# Patient Record
Sex: FEMALE | Race: WHITE | NOT HISPANIC OR LATINO | Employment: FULL TIME | URBAN - METROPOLITAN AREA
[De-identification: names, ages, dates, MRNs, and addresses within clinical notes are randomized per-mention and may not be internally consistent; named-entity substitution may affect disease eponyms.]

---

## 2017-04-13 ENCOUNTER — OFFICE VISIT (OUTPATIENT)
Dept: FAMILY MEDICINE | Facility: CLINIC | Age: 37
End: 2017-04-13
Payer: COMMERCIAL

## 2017-04-13 ENCOUNTER — TELEPHONE (OUTPATIENT)
Dept: FAMILY MEDICINE | Facility: CLINIC | Age: 37
End: 2017-04-13

## 2017-04-13 VITALS
TEMPERATURE: 97.8 F | OXYGEN SATURATION: 99 % | DIASTOLIC BLOOD PRESSURE: 90 MMHG | SYSTOLIC BLOOD PRESSURE: 140 MMHG | HEART RATE: 78 BPM

## 2017-04-13 DIAGNOSIS — M62.830 BACK MUSCLE SPASM: ICD-10-CM

## 2017-04-13 DIAGNOSIS — R30.0 DYSURIA: Primary | ICD-10-CM

## 2017-04-13 LAB
ALBUMIN UR-MCNC: NEGATIVE MG/DL
APPEARANCE UR: CLEAR
BACTERIA #/AREA URNS HPF: ABNORMAL /HPF
BETA HCG QUAL IFA URINE: NEGATIVE
BILIRUB UR QL STRIP: NEGATIVE
COLOR UR AUTO: YELLOW
GLUCOSE UR STRIP-MCNC: NEGATIVE MG/DL
HCG UR QL: NORMAL
HGB UR QL STRIP: ABNORMAL
KETONES UR STRIP-MCNC: NEGATIVE MG/DL
LEUKOCYTE ESTERASE UR QL STRIP: NEGATIVE
NITRATE UR QL: NEGATIVE
NON-SQ EPI CELLS #/AREA URNS LPF: ABNORMAL /LPF
PH UR STRIP: 6.5 PH (ref 5–7)
RBC #/AREA URNS AUTO: ABNORMAL /HPF (ref 0–2)
SP GR UR STRIP: 1.02 (ref 1–1.03)
URN SPEC COLLECT METH UR: ABNORMAL
UROBILINOGEN UR STRIP-ACNC: 1 EU/DL (ref 0.2–1)
WBC #/AREA URNS AUTO: ABNORMAL /HPF (ref 0–2)

## 2017-04-13 PROCEDURE — 84703 CHORIONIC GONADOTROPIN ASSAY: CPT | Performed by: NURSE PRACTITIONER

## 2017-04-13 PROCEDURE — 99213 OFFICE O/P EST LOW 20 MIN: CPT | Performed by: NURSE PRACTITIONER

## 2017-04-13 PROCEDURE — 81001 URINALYSIS AUTO W/SCOPE: CPT | Performed by: NURSE PRACTITIONER

## 2017-04-13 PROCEDURE — 87086 URINE CULTURE/COLONY COUNT: CPT | Performed by: NURSE PRACTITIONER

## 2017-04-13 RX ORDER — METHOCARBAMOL 750 MG/1
750 TABLET, FILM COATED ORAL 4 TIMES DAILY PRN
Qty: 40 TABLET | Refills: 0 | Status: SHIPPED | OUTPATIENT
Start: 2017-04-13 | End: 2017-06-01

## 2017-04-13 NOTE — TELEPHONE ENCOUNTER
Pt called with low back pain and just started with burning with urination.   Per RN.  She should be seen.  Apt scheduled.

## 2017-04-13 NOTE — PATIENT INSTRUCTIONS
"We will do a urine culture and if need be treat you.  If your symptoms worsen make sure you are seen for a repeat UA.    Take muscle relaxer as directed. Moist heat can also help relax muscles.    Follow up with primary care in the next week and sooner if needed.      Indications for emergent return to emergency department discussed with patient, who verbalized good understanding and agreement.  Patient understands the limitations of today's evaluation.         Dysuria  Dysuria is pain felt during urination. It is often described as a burning. Learn more about this problem and how it can be treated.     Painful urination (dysuria) is often caused by a problem in the urinary tract.   What causes dysuria?  Possible causes include:    Infection with a bacteria or virus. This can be a urinary tract infection (UTI). Or it may be a sexually transmitted infection (STI).    Sensitivity or allergy to chemicals. These chemicals are found in lotions and other products.    Prostate or bladder problems    Radiation therapy to the pelvic area  How is dysuria diagnosed?  Your health care provider will examine you. He or she will ask about your symptoms and health. After talking with you and doing a physical exam, your health care provider may know what is causing your dysuria. He or she will usually request  a sample of your urine. Tests of your urine, or a \"urinalysis,\" are done. A urinalysis may include:    Looking at the urine sample (visual exam)    Checking for substances (chemical exam)    Checking a small amount under a microscope (microscopic exam)  Some parts of the urinalysis may be done in the provider's office and some in a lab. And, the urine sample may be checked for bacteria and yeast (urine culture). Your health care provider will tell you more about these tests if they are needed.  How is dysuria treated?  Treatment depends on the cause. If you have a bacterial infection, you may need antibiotics. You may be given " medications to make it easier for you to urinate and help relieve pain. Your health care provider can tell you more about your treatment options. Untreated, symptoms may get worse.  Call the health care provider right away if you have any of the following:    Fever of 100.4 F (38 C) or higher     No improvement after three days of treatment    Trouble urinating because of pain    New or increased discharge from the vagina or penis    Rash or joint pain    Increased back or abdominal pain    Enlarged painful lymph nodes (lumps) in the groin     1781-7653 [employer]. [address] [place] [address]. All rights reserved. This information is not intended as a substitute for professional medical care. Always follow your healthcare professional's instructions.        Dysuria with Uncertain Cause (Adult)    The urethra is the tube that allows urine to pass out of the body. In a woman, the urethra is the opening above the vagina. In men, the urethra is the opening on the tip of the penis. Dysuria is the feeling of pain or burning in the urethra when passing urine.  Dysuria can be caused by anything that irritates or inflames the urethra. This can be caused by an infection or chemical irritation. The most common cause of dysuria in adults is a bladder infection. This is diagnosed with a urine test. It requires treatment with an antibiotic.  Soaps, lotions, colognes, feminine hygiene products, and birth control jellies, creams, and foams can cause chemical irritation and dysuria. It will go away in 1 to 3 days after the last time you use these irritants.  Sexually transmitted disease (STD) from chlamydia or gonorrhea can cause dysuria. If your doctor suspects this, he or she may take a culture specimen. It will take about 3 days to get the results. Antibiotic treatment may be started before the culture test returns.  In women who have gone through menopause, dysuria can be a result of dryness in the  lining of the urethra. This can be treated with hormones. Dysuria becomes long-term (chronic) when it lasts for weeks or months. You may need to see a specialist (urologist) to diagnose and treat chronic dysuria.  Home care  The following home care measures may help:    Avoid any chemicals or products that you suspect may be causing your symptoms.    If you were given a prescription medicine, take as directed and take the entire amount.    If a culture was taken, do not have sex until you have been told that it is negative (no infection). Then follow your healthcare provider's advice to treat your condition.  If a culture was done and it is positive:    Both you and your sexual partner need to be treated, even if your partner has no symptoms.    Contact your healthcare provider or go to an urgent care clinic or the public health department to be examined and treated.    Do not have sex until both you and your partner have completed all antibiotic medicine and are told that you are no longer contagious.    Learn about  safe sex  practices and use these in the future. The safest sex is with a partner who has tested negative and only has sex with you. Condoms offer protection from spreading some STDs, including gonorrhea, chlamydia and HIV, but are not a guarantee.  Follow-up care  Follow up with your healthcare provider as advised by our staff. If a culture was taken, call as directed the result. If diagnosed with an STD, follow up with your provider or the public health department for complete STD screening, including HIV testing. For more information, contact the National STD Hotline at 298-775-5744.  When to seek medical advice  Call your healthcare provider right away if any of these occur:    No improvement after three days of treatment    Fever of 100.4 F (38 C) or higher, or as directed    Increasing back or abdominal pain    Inability to urinate because of pain    A new discharge from the urethra, vagina or  penis    Painful sores on the penis    Rash or joint pain    Enlarged painful lymph nodes (lumps) in the groin    Testicle pain or swelling of the scrotum    1286-1684 [employer]. [address] [place] [address], [place] [address]. All rights reserved. This information is not intended as a substitute for professional medical care. Always follow your healthcare professional's instructions.        Back Spasm (No Trauma)    Spasm of the back muscles can occur after a sudden forceful twisting or bending force (such as in a car accident), after a simple awkward movement, or after lifting something heavy with poor body positioning. In either case, muscle spasm adds to the pain. Sleeping in an awkward position or on a poor quality mattress can also cause this. Some persons respond to emotional stress by tensing the muscles of their back.  Pain that continues may require further evaluation or other types of treatment such as physical therapy.  Unless you had a physical injury (for example, a car accident or fall), X-rays are usually not ordered for the initial evaluation of back pain. If pain continues and does not respond to medical treatment, X-rays and other tests may be performed at a later time.   Home care  1. As soon as possible, begin sitting or walking to avoid problems from prolonged bedrest (muscle weakness, worsening back stiffness and pain, blood clots in the legs).  2. When in bed, try to find a position of comfort. A firm mattress is best. Try lying flat on your back with pillows under your knees. You can also try lying on your side with your knees bent up toward your chest and a pillow between your knees.  3. Avoid prolonged sitting, long car rides or travel. This puts more stress on the lower back than standing or walking.   4. During the first 24 to 72 hours after an injury of flare-up apply an ice pack to the painful area for 20 minutes, then remove it for 20 minutes over a period of 60 to 90  minutes or several times a day. This will reduce swelling and pain. Always wrap ice packs in a thin towel.  5. You can start with ice, then switch to heat. Heat (hot shower, hot bath, or heating pad) reduces swelling and pain, and works well for muscle spasms. Heat can be applied to the painful area for 20 minutes , then remove it for 20 minutes over a period of 60 to 90 minutes or several times a day. As a safety precaution, do not sleep on a heating pad as it can burn or damage skin.  6. Ice and heat therapies can be alternated.  7. Gentle stretching will help your back heal faster. Perform this simple routine 2 to 3 times a day until your back is feeling better.     Low back stretch    Lie on your back with your knees bent and both feet on the ground.    Slowly raise your left knee to your chest as you flatten your lower back against the floor. Hold for 20 to 30 seconds.    Relax and repeat the exercise with your right knee.    Do 2 to 3 of these exercises for each leg.    Repeat, hugging both knees to your chest at the same time.    Do not bounce, but use a gentle pull.    8. Be aware of safe lifting methods and do not lift anything over 15 pounds until all the pain is gone.  Medications  Talk to your doctor before using medications, especially if you have other medical problems or are taking other medicines.  You may use acetaminophen (such as Tylenol) or ibuprofen (such as Advil or Motrin) to control pain, unless another pain medicine was prescribed. If you have a chronic condition such as diabetes, liver or kidney disease, stomach ulcer, or gastrointestinal bleeding, or are taking blood thinners, talk with your doctor before taking any medications.  Be careful if you are given prescription pain medications, narcotics, or medication for muscle spasm. They can cause drowsiness, affect your coordination, reflexes or judgment. Do not drive or operate heavy machinery.  Follow-up care  Follow up with your doctor or  this facility if your symptoms do not start to improve after one week. Physical therapy or further tests may be needed.  If X-rays were taken, they will be reviewed by a radiologist. You will be notified of any new findings that may affect your care.  Call 911  Seek emergency medica care if any of the following occur:    Trouble breathing    Confusion    Drowsiness or trouble awakening    Fainting or loss of consciousness    Rapid or very slow heart rate    Loss of bowel or bladder control  When to seek medical care  Get prompt medicat attention if any of the following occur:    Pain becomes worse or spreads to your legs    Weakness or numbness in one or both legs    Numbness in the groin or genital area    Unexplained fever over 100.4 F (38.0 C)    Burning or pain when passing urine    3633-2322 [email or web address] [address] [place] [address], [place] [address]. All rights reserved. This information is not intended as a substitute for professional medical care. Always follow your healthcare professional's instructions.

## 2017-04-13 NOTE — PROGRESS NOTES
SUBJECTIVE:                                                    Israel Zabala is a 36 year old female who presents to clinic today for the following health issues:      URINARY TRACT SYMPTOMS      Duration: started 2 days ago     Description  dysuria and back pain    Intensity:  moderate    Accompanying signs and symptoms:  Fever/chills: no   Flank pain YES  Nausea and vomiting: no   Vaginal symptoms: itching  Abdominal/Pelvic Pain: YES    History  History of frequent UTI's: no   History of kidney stones: no   Sexually Active: YES  Possibility of pregnancy: YES- no birth control or condom use. Uses the pull out method.     Precipitating or alleviating factors: None    Therapies tried and outcome: cranberry juice and water        Has low back issues since teenager when she fell down steps and hurt low back. Has put on about 70 pounds in last 3 years after bariactric surgery in the early 2000's. She has had much stress with a special needs daughter and herself having a lobectomy due to lung cancer about 4 years ago.      Problem list and histories reviewed & adjusted, as indicated.  Additional history: as documented    Patient Active Problem List   Diagnosis     Chronic maxillary sinusitis     CARDIOVASCULAR SCREENING; LDL GOAL LESS THAN 160     Increased BMI     Vulvar abnormality in pregnancy, delivered     Anxiety     Carcinoid tumor     Screening for cardiovascular condition     URI (upper respiratory infection)     CTS (carpal tunnel syndrome)     Obesity     Morbid obesity, unspecified obesity type (H)     Personal history of gastric bypass     Excessive bleeding in premenopausal period     Past Surgical History:   Procedure Laterality Date     BRONCHOSCOPY FLEXIBLE  11/1/2013    Procedure: BRONCHOSCOPY FLEXIBLE;  Flexible Bronchoscopy, Endobronchial Flexible Ultrasound;  Surgeon: Chris Sal MD;  Location: [facility] OR     C REMOVAL OF LUNG,BILOBECTOMY  11/22/2013    Right Middle and Lower Lobe [place]       ENDOBRONCHIAL ULTRASOUND FLEXIBLE  11/1/2013    Procedure: ENDOBRONCHIAL ULTRASOUND FLEXIBLE;;  Surgeon: Chris Sal MD;  Location: [facility] OR     GASTRIC BYPASS  2003     HERNIORRHAPHY INCISIONAL (LOCATION)  8/19/2011    Procedure:HERNIORRHAPHY INCISIONAL (LOCATION); Incisional Hernia Repair upper abdomen midline       THORACOTOMY      Carcinoid Tumor     TONSILLECTOMY  age 7       Social History   Substance Use Topics     Smoking status: Former Smoker     Packs/day: 0.10     Years: 8.00     Types: Cigarettes     Quit date: 9/26/2008     Smokeless tobacco: Never Used     Alcohol use Yes      Comment: occasional- quit with pregnancy     Family History   Problem Relation Age of Onset     Breast Cancer Maternal Grandmother 65     CANCER Maternal Grandmother      skin     DIABETES Maternal Grandfather      HEART DISEASE Paternal Grandmother      MI     CEREBROVASCULAR DISEASE Paternal Grandmother      HEART DISEASE Paternal Grandfather      MI     GASTROINTESTINAL DISEASE Brother      Colitis     Prostate Cancer Father      CANCER Mother      skin         Current Outpatient Prescriptions   Medication Sig Dispense Refill     methocarbamol (ROBAXIN) 750 MG tablet Take 1 tablet (750 mg) by mouth 4 times daily as needed for muscle spasms 40 tablet 0     Phentermine-Topiramate 3.75-23 MG CP24 Take 1 capsule by mouth daily 14 capsule 0     ciclopirox (LOPROX) 0.77 % cream Apply twice daily for 2-3 weeks. 30 g 1     fluticasone (FLONASE) 50 MCG/ACT nasal spray Spray 1-2 sprays into both nostrils daily 1 g 0     ferrous sulfate (IRON) 325 (65 FE) MG tablet Take 1 tablet (325 mg) by mouth 2 times daily 60 tablet 2     Multiple Vitamins-Minerals (MULTIVITAMIN PO)        Cholecalciferol (VITAMIN D) 2000 UNITS tablet Take 1 tablet by mouth daily       VITAMIN B-12 PO 1 TABLET ORALLY DAILY(HOME MED)       Phentermine-Topiramate 7.5-46 MG CP24 Take 1 capsule by mouth daily (Patient not taking: Reported on 4/13/2017) 30  capsule 3     No Known Allergies  Labs reviewed in EPIC    Reviewed and updated as needed this visit by clinical staff  Tobacco  Allergies  Meds  Med Hx  Surg Hx  Fam Hx  Soc Hx      Reviewed and updated as needed this visit by Provider         ROS:  Constitutional, HEENT, cardiovascular, pulmonary, GI, , musculoskeletal, neuro, skin, endocrine and psych systems are negative, except as otherwise noted.    OBJECTIVE:                                                    /90  Pulse 78  Temp 97.8  F (36.6  C) (Tympanic)  SpO2 99%  There is no height or weight on file to calculate BMI.  GENERAL: healthy, alert and no distress, nontoxic in appearance  EYES: Eyes grossly normal to inspection, PERRL and conjunctivae and sclerae normal  HENT: normocephalic  NECK: supple with full ROM  Neg CVA tenderness  ABDOMEN: soft, nontender  MS: spine nontender to palpation with spasms bilaterally in lumbar region adjacent to spine. No radiculopathy.    Diagnostic Test Results:  Results for orders placed or performed in visit on 04/13/17 (from the past 24 hour(s))   *UA reflex to Microscopic and Culture ([facility] (except [place] and [place])   Result Value Ref Range    Color Urine Yellow     Appearance Urine Clear     Glucose Urine Negative NEG mg/dL    Bilirubin Urine Negative NEG    Ketones Urine Negative NEG mg/dL    Specific Gravity Urine 1.020 1.003 - 1.035    Blood Urine Trace (A) NEG    pH Urine 6.5 5.0 - 7.0 pH    Protein Albumin Urine Negative NEG mg/dL    Urobilinogen Urine 1.0 0.2 - 1.0 EU/dL    Nitrite Urine Negative NEG    Leukocyte Esterase Urine Negative NEG    Source Midstream Urine    Urine Microscopic   Result Value Ref Range    WBC Urine O - 2 0 - 2 /HPF    RBC Urine O - 2 0 - 2 /HPF    Squamous Epithelial /LPF Urine Few FEW /LPF    Bacteria Urine Few (A) NEG /HPF   HCG qualitative urine   Result Value Ref Range    HCG Qual Urine Negative NEG        ASSESSMENT/PLAN:                                                       Problem List Items Addressed This Visit     None      Visit Diagnoses     Dysuria    -  Primary    Relevant Medications    methocarbamol (ROBAXIN) 750 MG tablet    Other Relevant Orders    *UA reflex to Microscopic and Culture ([facility] (except [place] and [place]) (Completed)    Urine Microscopic (Completed)    Wet prep (Completed)    HCG qualitative urine (Completed)    Urine Culture Aerobic Bacterial (Completed)    Back muscle spasm        Relevant Medications    methocarbamol (ROBAXIN) 750 MG tablet               Patient Instructions     We will do a urine culture and if need be treat you.  If your symptoms worsen make sure you are seen for a repeat UA.    Take muscle relaxer as directed. Moist heat can also help relax muscles.    Follow up with primary care in the next week and sooner if needed.      Indications for emergent return to emergency department discussed with patient, who verbalized good understanding and agreement.  Patient understands the limitations of today's evaluation.         Dysuria  Dysuria is pain felt during urination. It is often described as a burning. Learn more about this problem and how it can be treated.     Painful urination (dysuria) is often caused by a problem in the urinary tract.   What causes dysuria?  Possible causes include:    Infection with a bacteria or virus. This can be a urinary tract infection (UTI). Or it may be a sexually transmitted infection (STI).    Sensitivity or allergy to chemicals. These chemicals are found in lotions and other products.    Prostate or bladder problems    Radiation therapy to the pelvic area  How is dysuria diagnosed?  Your health care provider will examine you. He or she will ask about your symptoms and health. After talking with you and doing a physical exam, your health care provider may know what is causing your dysuria. He or she will usually request  a sample of your urine.  "Tests of your urine, or a \"urinalysis,\" are done. A urinalysis may include:    Looking at the urine sample (visual exam)    Checking for substances (chemical exam)    Checking a small amount under a microscope (microscopic exam)  Some parts of the urinalysis may be done in the provider's office and some in a lab. And, the urine sample may be checked for bacteria and yeast (urine culture). Your health care provider will tell you more about these tests if they are needed.  How is dysuria treated?  Treatment depends on the cause. If you have a bacterial infection, you may need antibiotics. You may be given medications to make it easier for you to urinate and help relieve pain. Your health care provider can tell you more about your treatment options. Untreated, symptoms may get worse.  Call the health care provider right away if you have any of the following:    Fever of 100.4 F (38 C) or higher     No improvement after three days of treatment    Trouble urinating because of pain    New or increased discharge from the vagina or penis    Rash or joint pain    Increased back or abdominal pain    Enlarged painful lymph nodes (lumps) in the groin     9109-3667 [employer]. [address] [place] [address]. All rights reserved. This information is not intended as a substitute for professional medical care. Always follow your healthcare professional's instructions.        Dysuria with Uncertain Cause (Adult)    The urethra is the tube that allows urine to pass out of the body. In a woman, the urethra is the opening above the vagina. In men, the urethra is the opening on the tip of the penis. Dysuria is the feeling of pain or burning in the urethra when passing urine.  Dysuria can be caused by anything that irritates or inflames the urethra. This can be caused by an infection or chemical irritation. The most common cause of dysuria in adults is a bladder infection. This is diagnosed with a urine test. It " requires treatment with an antibiotic.  Soaps, lotions, colognes, feminine hygiene products, and birth control jellies, creams, and foams can cause chemical irritation and dysuria. It will go away in 1 to 3 days after the last time you use these irritants.  Sexually transmitted disease (STD) from chlamydia or gonorrhea can cause dysuria. If your doctor suspects this, he or she may take a culture specimen. It will take about 3 days to get the results. Antibiotic treatment may be started before the culture test returns.  In women who have gone through menopause, dysuria can be a result of dryness in the lining of the urethra. This can be treated with hormones. Dysuria becomes long-term (chronic) when it lasts for weeks or months. You may need to see a specialist (urologist) to diagnose and treat chronic dysuria.  Home care  The following home care measures may help:    Avoid any chemicals or products that you suspect may be causing your symptoms.    If you were given a prescription medicine, take as directed and take the entire amount.    If a culture was taken, do not have sex until you have been told that it is negative (no infection). Then follow your healthcare provider's advice to treat your condition.  If a culture was done and it is positive:    Both you and your sexual partner need to be treated, even if your partner has no symptoms.    Contact your healthcare provider or go to an urgent care clinic or the public health department to be examined and treated.    Do not have sex until both you and your partner have completed all antibiotic medicine and are told that you are no longer contagious.    Learn about  safe sex  practices and use these in the future. The safest sex is with a partner who has tested negative and only has sex with you. Condoms offer protection from spreading some STDs, including gonorrhea, chlamydia and HIV, but are not a guarantee.  Follow-up care  Follow up with your healthcare provider  as advised by our staff. If a culture was taken, call as directed the result. If diagnosed with an STD, follow up with your provider or the public health department for complete STD screening, including HIV testing. For more information, contact the National STD Hotline at 869-862-5435.  When to seek medical advice  Call your healthcare provider right away if any of these occur:    No improvement after three days of treatment    Fever of 100.4 F (38 C) or higher, or as directed    Increasing back or abdominal pain    Inability to urinate because of pain    A new discharge from the urethra, vagina or penis    Painful sores on the penis    Rash or joint pain    Enlarged painful lymph nodes (lumps) in the groin    Testicle pain or swelling of the scrotum    9174-4811 [employer]. [address] [place] [address], [place] [address]. All rights reserved. This information is not intended as a substitute for professional medical care. Always follow your healthcare professional's instructions.        Back Spasm (No Trauma)    Spasm of the back muscles can occur after a sudden forceful twisting or bending force (such as in a car accident), after a simple awkward movement, or after lifting something heavy with poor body positioning. In either case, muscle spasm adds to the pain. Sleeping in an awkward position or on a poor quality mattress can also cause this. Some persons respond to emotional stress by tensing the muscles of their back.  Pain that continues may require further evaluation or other types of treatment such as physical therapy.  Unless you had a physical injury (for example, a car accident or fall), X-rays are usually not ordered for the initial evaluation of back pain. If pain continues and does not respond to medical treatment, X-rays and other tests may be performed at a later time.   Home care  1. As soon as possible, begin sitting or walking to avoid problems from prolonged bedrest (muscle weakness,  worsening back stiffness and pain, blood clots in the legs).  2. When in bed, try to find a position of comfort. A firm mattress is best. Try lying flat on your back with pillows under your knees. You can also try lying on your side with your knees bent up toward your chest and a pillow between your knees.  3. Avoid prolonged sitting, long car rides or travel. This puts more stress on the lower back than standing or walking.   4. During the first 24 to 72 hours after an injury of flare-up apply an ice pack to the painful area for 20 minutes, then remove it for 20 minutes over a period of 60 to 90 minutes or several times a day. This will reduce swelling and pain. Always wrap ice packs in a thin towel.  5. You can start with ice, then switch to heat. Heat (hot shower, hot bath, or heating pad) reduces swelling and pain, and works well for muscle spasms. Heat can be applied to the painful area for 20 minutes , then remove it for 20 minutes over a period of 60 to 90 minutes or several times a day. As a safety precaution, do not sleep on a heating pad as it can burn or damage skin.  6. Ice and heat therapies can be alternated.  7. Gentle stretching will help your back heal faster. Perform this simple routine 2 to 3 times a day until your back is feeling better.     Low back stretch    Lie on your back with your knees bent and both feet on the ground.    Slowly raise your left knee to your chest as you flatten your lower back against the floor. Hold for 20 to 30 seconds.    Relax and repeat the exercise with your right knee.    Do 2 to 3 of these exercises for each leg.    Repeat, hugging both knees to your chest at the same time.    Do not bounce, but use a gentle pull.    8. Be aware of safe lifting methods and do not lift anything over 15 pounds until all the pain is gone.  Medications  Talk to your doctor before using medications, especially if you have other medical problems or are taking other medicines.  You may  use acetaminophen (such as Tylenol) or ibuprofen (such as Advil or Motrin) to control pain, unless another pain medicine was prescribed. If you have a chronic condition such as diabetes, liver or kidney disease, stomach ulcer, or gastrointestinal bleeding, or are taking blood thinners, talk with your doctor before taking any medications.  Be careful if you are given prescription pain medications, narcotics, or medication for muscle spasm. They can cause drowsiness, affect your coordination, reflexes or judgment. Do not drive or operate heavy machinery.  Follow-up care  Follow up with your doctor or this facility if your symptoms do not start to improve after one week. Physical therapy or further tests may be needed.  If X-rays were taken, they will be reviewed by a radiologist. You will be notified of any new findings that may affect your care.  Call 911  Seek emergency medica care if any of the following occur:    Trouble breathing    Confusion    Drowsiness or trouble awakening    Fainting or loss of consciousness    Rapid or very slow heart rate    Loss of bowel or bladder control  When to seek medical care  Get prompt medicat attention if any of the following occur:    Pain becomes worse or spreads to your legs    Weakness or numbness in one or both legs    Numbness in the groin or genital area    Unexplained fever over 100.4 F (38.0 C)    Burning or pain when passing urine    1211-7580 [employer]. [address] [place] [address], [place] [address]. All rights reserved. This information is not intended as a substitute for professional medical care. Always follow your healthcare professional's instructions.            GILDA Jerez [facility]

## 2017-04-13 NOTE — NURSING NOTE
"Chief Complaint   Patient presents with     UTI     /90  Pulse 78  Temp 97.8  F (36.6  C) (Tympanic)  SpO2 99% Estimated body mass index is 48.87 kg/(m^2) as calculated from the following:    Height as of 12/16/16: 5' 2.75\" (1.594 m).    Weight as of 12/16/16: 273 lb 11.2 oz (124.1 kg).  bp completed using cuff size: large      Health Maintenance that is potentially due pending provider review:  none        "

## 2017-04-13 NOTE — MR AVS SNAPSHOT
"              After Visit Summary   4/13/2017    Israel Zabala    MRN: [id number]           Patient Information     Date Of Birth          1980        Visit Information        Provider Department      4/13/2017 4:40 PM Aimee Gifford APRN [facility]        Today's Diagnoses     Dysuria    -  1    Back muscle spasm          Care Instructions    We will do a urine culture and if need be treat you.  If your symptoms worsen make sure you are seen for a repeat UA.    Take muscle relaxer as directed. Moist heat can also help relax muscles.    Follow up with primary care in the next week and sooner if needed.      Indications for emergent return to emergency department discussed with patient, who verbalized good understanding and agreement.  Patient understands the limitations of today's evaluation.         Dysuria  Dysuria is pain felt during urination. It is often described as a burning. Learn more about this problem and how it can be treated.     Painful urination (dysuria) is often caused by a problem in the urinary tract.   What causes dysuria?  Possible causes include:    Infection with a bacteria or virus. This can be a urinary tract infection (UTI). Or it may be a sexually transmitted infection (STI).    Sensitivity or allergy to chemicals. These chemicals are found in lotions and other products.    Prostate or bladder problems    Radiation therapy to the pelvic area  How is dysuria diagnosed?  Your health care provider will examine you. He or she will ask about your symptoms and health. After talking with you and doing a physical exam, your health care provider may know what is causing your dysuria. He or she will usually request  a sample of your urine. Tests of your urine, or a \"urinalysis,\" are done. A urinalysis may include:    Looking at the urine sample (visual exam)    Checking for substances (chemical exam)    Checking a small amount under a microscope (microscopic " exam)  Some parts of the urinalysis may be done in the provider's office and some in a lab. And, the urine sample may be checked for bacteria and yeast (urine culture). Your health care provider will tell you more about these tests if they are needed.  How is dysuria treated?  Treatment depends on the cause. If you have a bacterial infection, you may need antibiotics. You may be given medications to make it easier for you to urinate and help relieve pain. Your health care provider can tell you more about your treatment options. Untreated, symptoms may get worse.  Call the health care provider right away if you have any of the following:    Fever of 100.4 F (38 C) or higher     No improvement after three days of treatment    Trouble urinating because of pain    New or increased discharge from the vagina or penis    Rash or joint pain    Increased back or abdominal pain    Enlarged painful lymph nodes (lumps) in the groin     4040-4209 [employer]. [address] [place] [address]. All rights reserved. This information is not intended as a substitute for professional medical care. Always follow your healthcare professional's instructions.        Dysuria with Uncertain Cause (Adult)    The urethra is the tube that allows urine to pass out of the body. In a woman, the urethra is the opening above the vagina. In men, the urethra is the opening on the tip of the penis. Dysuria is the feeling of pain or burning in the urethra when passing urine.  Dysuria can be caused by anything that irritates or inflames the urethra. This can be caused by an infection or chemical irritation. The most common cause of dysuria in adults is a bladder infection. This is diagnosed with a urine test. It requires treatment with an antibiotic.  Soaps, lotions, colognes, feminine hygiene products, and birth control jellies, creams, and foams can cause chemical irritation and dysuria. It will go away in 1 to 3 days after the  last time you use these irritants.  Sexually transmitted disease (STD) from chlamydia or gonorrhea can cause dysuria. If your doctor suspects this, he or she may take a culture specimen. It will take about 3 days to get the results. Antibiotic treatment may be started before the culture test returns.  In women who have gone through menopause, dysuria can be a result of dryness in the lining of the urethra. This can be treated with hormones. Dysuria becomes long-term (chronic) when it lasts for weeks or months. You may need to see a specialist (urologist) to diagnose and treat chronic dysuria.  Home care  The following home care measures may help:    Avoid any chemicals or products that you suspect may be causing your symptoms.    If you were given a prescription medicine, take as directed and take the entire amount.    If a culture was taken, do not have sex until you have been told that it is negative (no infection). Then follow your healthcare provider's advice to treat your condition.  If a culture was done and it is positive:    Both you and your sexual partner need to be treated, even if your partner has no symptoms.    Contact your healthcare provider or go to an urgent care clinic or the public health department to be examined and treated.    Do not have sex until both you and your partner have completed all antibiotic medicine and are told that you are no longer contagious.    Learn about  safe sex  practices and use these in the future. The safest sex is with a partner who has tested negative and only has sex with you. Condoms offer protection from spreading some STDs, including gonorrhea, chlamydia and HIV, but are not a guarantee.  Follow-up care  Follow up with your healthcare provider as advised by our staff. If a culture was taken, call as directed the result. If diagnosed with an STD, follow up with your provider or the public health department for complete STD screening, including HIV testing. For  more information, contact the National STD Hotline at 810-065-8432.  When to seek medical advice  Call your healthcare provider right away if any of these occur:    No improvement after three days of treatment    Fever of 100.4 F (38 C) or higher, or as directed    Increasing back or abdominal pain    Inability to urinate because of pain    A new discharge from the urethra, vagina or penis    Painful sores on the penis    Rash or joint pain    Enlarged painful lymph nodes (lumps) in the groin    Testicle pain or swelling of the scrotum    0686-7975 [employer]. [address] [place] [address]. All rights reserved. This information is not intended as a substitute for professional medical care. Always follow your healthcare professional's instructions.        Back Spasm (No Trauma)    Spasm of the back muscles can occur after a sudden forceful twisting or bending force (such as in a car accident), after a simple awkward movement, or after lifting something heavy with poor body positioning. In either case, muscle spasm adds to the pain. Sleeping in an awkward position or on a poor quality mattress can also cause this. Some persons respond to emotional stress by tensing the muscles of their back.  Pain that continues may require further evaluation or other types of treatment such as physical therapy.  Unless you had a physical injury (for example, a car accident or fall), X-rays are usually not ordered for the initial evaluation of back pain. If pain continues and does not respond to medical treatment, X-rays and other tests may be performed at a later time.   Home care  1. As soon as possible, begin sitting or walking to avoid problems from prolonged bedrest (muscle weakness, worsening back stiffness and pain, blood clots in the legs).  2. When in bed, try to find a position of comfort. A firm mattress is best. Try lying flat on your back with pillows under your knees. You can also try lying on  your side with your knees bent up toward your chest and a pillow between your knees.  3. Avoid prolonged sitting, long car rides or travel. This puts more stress on the lower back than standing or walking.   4. During the first 24 to 72 hours after an injury of flare-up apply an ice pack to the painful area for 20 minutes, then remove it for 20 minutes over a period of 60 to 90 minutes or several times a day. This will reduce swelling and pain. Always wrap ice packs in a thin towel.  5. You can start with ice, then switch to heat. Heat (hot shower, hot bath, or heating pad) reduces swelling and pain, and works well for muscle spasms. Heat can be applied to the painful area for 20 minutes , then remove it for 20 minutes over a period of 60 to 90 minutes or several times a day. As a safety precaution, do not sleep on a heating pad as it can burn or damage skin.  6. Ice and heat therapies can be alternated.  7. Gentle stretching will help your back heal faster. Perform this simple routine 2 to 3 times a day until your back is feeling better.     Low back stretch    Lie on your back with your knees bent and both feet on the ground.    Slowly raise your left knee to your chest as you flatten your lower back against the floor. Hold for 20 to 30 seconds.    Relax and repeat the exercise with your right knee.    Do 2 to 3 of these exercises for each leg.    Repeat, hugging both knees to your chest at the same time.    Do not bounce, but use a gentle pull.    8. Be aware of safe lifting methods and do not lift anything over 15 pounds until all the pain is gone.  Medications  Talk to your doctor before using medications, especially if you have other medical problems or are taking other medicines.  You may use acetaminophen (such as Tylenol) or ibuprofen (such as Advil or Motrin) to control pain, unless another pain medicine was prescribed. If you have a chronic condition such as diabetes, liver or kidney disease, stomach  ulcer, or gastrointestinal bleeding, or are taking blood thinners, talk with your doctor before taking any medications.  Be careful if you are given prescription pain medications, narcotics, or medication for muscle spasm. They can cause drowsiness, affect your coordination, reflexes or judgment. Do not drive or operate heavy machinery.  Follow-up care  Follow up with your doctor or this facility if your symptoms do not start to improve after one week. Physical therapy or further tests may be needed.  If X-rays were taken, they will be reviewed by a radiologist. You will be notified of any new findings that may affect your care.  Call 911  Seek emergency medica care if any of the following occur:    Trouble breathing    Confusion    Drowsiness or trouble awakening    Fainting or loss of consciousness    Rapid or very slow heart rate    Loss of bowel or bladder control  When to seek medical care  Get prompt medicat attention if any of the following occur:    Pain becomes worse or spreads to your legs    Weakness or numbness in one or both legs    Numbness in the groin or genital area    Unexplained fever over 100.4 F (38.0 C)    Burning or pain when passing urine    9871-2177 [employer]. [address] [place] [address]. All rights reserved. This information is not intended as a substitute for professional medical care. Always follow your healthcare professional's instructions.              Follow-ups after your visit        Who to contact     If you have questions or need follow up information about today's clinic visit or your schedule please contact [facility] directly at [phone].  Normal or non-critical lab and imaging results will be communicated to you by MyChart, letter or phone within 4 business days after the clinic has received the results. If you do not hear from us within 7 days, please contact the clinic through MyChart or phone. If you have a critical or  abnormal lab result, we will notify you by phone as soon as possible.  Submit refill requests through [employer] or call your pharmacy and they will forward the refill request to us. Please allow 3 business days for your refill to be completed.          Additional Information About Your Visit        [employer] Information     [employer] gives you secure access to your electronic health record. If you see a primary care provider, you can also send messages to your care team and make appointments. If you have questions, please call your primary care clinic.  If you do not have a primary care provider, please call [phone] and they will assist you.        Care EveryWhere ID     This is your Care EveryWhere ID. This could be used by other organizations to access your [facility] medical records  [id number]        Your Vitals Were     Pulse Temperature Pulse Oximetry             78 97.8  F (36.6  C) (Tympanic) 99%          Blood Pressure from Last 3 Encounters:   04/13/17 140/90   12/16/16 141/79   11/14/16 133/80    Weight from Last 3 Encounters:   12/16/16 273 lb 11.2 oz (124.1 kg)   11/14/16 270 lb 3.2 oz (122.6 kg)   11/08/16 272 lb 3.2 oz (123.5 kg)              We Performed the Following     *UA reflex to Microscopic and Culture ([place] and [facility] (except [place] and [place])     HCG qualitative urine     Urine Culture Aerobic Bacterial     Urine Microscopic     Wet prep          Today's Medication Changes          These changes are accurate as of: 4/13/17  6:16 PM.  If you have any questions, ask your nurse or doctor.               Start taking these medicines.        Dose/Directions    methocarbamol 750 MG tablet   Commonly known as:  ROBAXIN   Used for:  Dysuria   Started by:  Aimee Gifford APRN CNP        Dose:  750 mg   Take 1 tablet (750 mg) by mouth 4 times daily as needed for muscle spasms   Quantity:  40 tablet   Refills:  0            Where to get your medicines      These medications were  sent to [id number] - [place], [place] - [address]  [address] [facility] [address]    Hours:  Closed 10-16-08 business to [facility] Phone:  [phone]     methocarbamol 750 MG tablet                Primary Care Provider Office Phone # Fax #    Darcy Yang [phone]       [facility] [address] [facility] [address]        Thank you!     Thank you for choosing [facility]  for your care. Our goal is always to provide you with excellent care. Hearing back from our patients is one way we can continue to improve our services. Please take a few minutes to complete the written survey that you may receive in the mail after your visit with us. Thank you!             Your Updated Medication List - Protect others around you: Learn how to safely use, store and throw away your medicines at www.disposemymeds.org.          This list is accurate as of: 4/13/17  6:16 PM.  Always use your most recent med list.                   Brand Name Dispense Instructions for use    ciclopirox 0.77 % cream    LOPROX    30 g    Apply twice daily for 2-3 weeks.       ferrous sulfate 325 (65 FE) MG tablet    IRON    60 tablet    Take 1 tablet (325 mg) by mouth 2 times daily       fluticasone 50 MCG/ACT spray    FLONASE    1 g    Spray 1-2 sprays into both nostrils daily       methocarbamol 750 MG tablet    ROBAXIN    40 tablet    Take 1 tablet (750 mg) by mouth 4 times daily as needed for muscle spasms       MULTIVITAMIN PO          * Phentermine-Topiramate 7.5-46 MG Cp24     30 capsule    Take 1 capsule by mouth daily       * Phentermine-Topiramate 3.75-23 MG Cp24     14 capsule    Take 1 capsule by mouth daily       VITAMIN B-12 PO      1 TABLET ORALLY DAILY(HOME MED)       vitamin D 2000 UNITS tablet      Take 1 tablet by mouth daily       * Notice:  This list has 2 medication(s) that are the same as other medications prescribed for you. Read the  directions carefully, and ask your doctor or other care provider to review them with you.

## 2017-04-15 LAB
BACTERIA SPEC CULT: NORMAL
MICRO REPORT STATUS: NORMAL
SPECIMEN SOURCE: NORMAL

## 2017-05-09 ENCOUNTER — ALLIED HEALTH/NURSE VISIT (OUTPATIENT)
Dept: FAMILY MEDICINE | Facility: CLINIC | Age: 37
End: 2017-05-09
Payer: COMMERCIAL

## 2017-05-09 DIAGNOSIS — Z11.1 SCREENING EXAMINATION FOR PULMONARY TUBERCULOSIS: Primary | ICD-10-CM

## 2017-05-09 PROCEDURE — 99207 ZZC NO CHARGE NURSE ONLY: CPT

## 2017-05-09 PROCEDURE — 86480 TB TEST CELL IMMUN MEASURE: CPT | Performed by: NURSE PRACTITIONER

## 2017-05-09 PROCEDURE — 36415 COLL VENOUS BLD VENIPUNCTURE: CPT | Performed by: NURSE PRACTITIONER

## 2017-05-09 NOTE — NURSING NOTE
Prior to injection verified patient identity using patient's name and date of birth.        The patient is asked the following questions today and these are her answers:    -Have you had a mantoux administered in the past 30 days?    No  -Have you had a previous positive Mantoux.  In 2006, she had a reaction to the mantoux with a negative chest xray.  -Have you received BCG in the past.  No  -Have you had a live vaccine  (MMR, Varicella, OPV, Yellow Fever) in the last 6 weeks.  No  -Have you had and active  viral or bacterial infection in the past 6 weeks.  No  -Have you received corticosteroids or immunosuppressive agents in the past 6 weeks.  No  -Have you been diagnosed with HIV?  No  -Do you have a maglinancy?  No     [facility] and [facility] contacted regarding possible reaction to previous mantoux in 2006.  Recommendation was to have a blood test.  Patient notified of this advice and agrees with this plan.  Rachana Porter NP ordered lab test after discussion regarding this.

## 2017-05-09 NOTE — MR AVS SNAPSHOT
After Visit Summary   5/9/2017    Israel Zabala    MRN: [id number]           Patient Information     Date Of Birth          1980        Visit Information        Provider Department      5/9/2017 8:45 AM FL ERYN VINES/LPN [facility]        Today's Diagnoses     Screening examination for pulmonary tuberculosis    -  1       Follow-ups after your visit        Who to contact     If you have questions or need follow up information about today's clinic visit or your schedule please contact [facility] directly at [phone].  Normal or non-critical lab and imaging results will be communicated to you by [employer], letter or phone within 4 business days after the clinic has received the results. If you do not hear from us within 7 days, please contact the clinic through [employer] or phone. If you have a critical or abnormal lab result, we will notify you by phone as soon as possible.  Submit refill requests through [employer] or call your pharmacy and they will forward the refill request to us. Please allow 3 business days for your refill to be completed.          Additional Information About Your Visit        MyChart Information     [employer] gives you secure access to your electronic health record. If you see a primary care provider, you can also send messages to your care team and make appointments. If you have questions, please call your primary care clinic.  If you do not have a primary care provider, please call [phone] and they will assist you.        Care EveryWhere ID     This is your Care EveryWhere ID. This could be used by other organizations to access your [facility] medical records  [id number]         Blood Pressure from Last 3 Encounters:   04/13/17 140/90   12/16/16 141/79   11/14/16 133/80    Weight from Last 3 Encounters:   12/16/16 273 lb 11.2 oz (124.1 kg)   11/14/16 270 lb 3.2 oz (122.6 kg)   11/08/16 272 lb 3.2 oz (123.5 kg)              We Performed  the Following     M Tuberculosis by Quantiferon        Primary Care Provider Office Phone # Fax #    Darcy Yang [phone]       [facility] [address] [facility] [address]        Thank you!     Thank you for choosing [facility]  for your care. Our goal is always to provide you with excellent care. Hearing back from our patients is one way we can continue to improve our services. Please take a few minutes to complete the written survey that you may receive in the mail after your visit with us. Thank you!             Your Updated Medication List - Protect others around you: Learn how to safely use, store and throw away your medicines at www.disposemymeds.org.          This list is accurate as of: 5/9/17  9:12 AM.  Always use your most recent med list.                   Brand Name Dispense Instructions for use    ciclopirox 0.77 % cream    LOPROX    30 g    Apply twice daily for 2-3 weeks.       ferrous sulfate 325 (65 FE) MG tablet    IRON    60 tablet    Take 1 tablet (325 mg) by mouth 2 times daily       fluticasone 50 MCG/ACT spray    FLONASE    1 g    Spray 1-2 sprays into both nostrils daily       methocarbamol 750 MG tablet    ROBAXIN    40 tablet    Take 1 tablet (750 mg) by mouth 4 times daily as needed for muscle spasms       MULTIVITAMIN PO          * Phentermine-Topiramate 7.5-46 MG Cp24     30 capsule    Take 1 capsule by mouth daily       * Phentermine-Topiramate 3.75-23 MG Cp24     14 capsule    Take 1 capsule by mouth daily       VITAMIN B-12 PO      1 TABLET ORALLY DAILY(HOME MED)       vitamin D 2000 UNITS tablet      Take 1 tablet by mouth daily       * Notice:  This list has 2 medication(s) that are the same as other medications prescribed for you. Read the directions carefully, and ask your doctor or other care provider to review them with you.

## 2017-05-10 LAB
M TB TUBERC IFN-G BLD QL: ABNORMAL
M TB TUBERC IFN-G/MITOGEN IGNF BLD: 0.53 IU/ML

## 2017-05-12 DIAGNOSIS — Z11.1 SCREENING EXAMINATION FOR PULMONARY TUBERCULOSIS: Primary | ICD-10-CM

## 2017-06-01 ENCOUNTER — OFFICE VISIT (OUTPATIENT)
Dept: FAMILY MEDICINE | Facility: CLINIC | Age: 37
End: 2017-06-01
Payer: COMMERCIAL

## 2017-06-01 ENCOUNTER — RADIANT APPOINTMENT (OUTPATIENT)
Dept: GENERAL RADIOLOGY | Facility: CLINIC | Age: 37
End: 2017-06-01
Attending: FAMILY MEDICINE
Payer: COMMERCIAL

## 2017-06-01 VITALS
HEART RATE: 76 BPM | HEIGHT: 62 IN | BODY MASS INDEX: 50.61 KG/M2 | SYSTOLIC BLOOD PRESSURE: 132 MMHG | DIASTOLIC BLOOD PRESSURE: 82 MMHG | TEMPERATURE: 98.1 F | WEIGHT: 275 LBS

## 2017-06-01 DIAGNOSIS — R76.11 MANTOUX: POSITIVE: ICD-10-CM

## 2017-06-01 DIAGNOSIS — R76.11 MANTOUX: POSITIVE: Primary | ICD-10-CM

## 2017-06-01 PROCEDURE — 99213 OFFICE O/P EST LOW 20 MIN: CPT | Performed by: FAMILY MEDICINE

## 2017-06-01 PROCEDURE — 71020 XR CHEST 2 VW: CPT

## 2017-06-01 NOTE — NURSING NOTE
"Chief Complaint   Patient presents with     Results       Initial /82 (BP Location: Right arm, Patient Position: Chair, Cuff Size: Adult Large)  Pulse 76  Temp 98.1  F (36.7  C) (Tympanic)  Ht 5' 2.25\" (1.581 m)  Wt 275 lb (124.7 kg)  BMI 49.89 kg/m2 Estimated body mass index is 49.89 kg/(m^2) as calculated from the following:    Height as of this encounter: 5' 2.25\" (1.581 m).    Weight as of this encounter: 275 lb (124.7 kg).  Medication Reconciliation: complete    Health Maintenance that is potentially due pending provider review:  NONE    n/a    Janiya ASHLEY CMA    "

## 2017-06-01 NOTE — MR AVS SNAPSHOT
"              After Visit Summary   6/1/2017    Israel Zabala    MRN: [id number]           Patient Information     Date Of Birth          1980        Visit Information        Provider Department      6/1/2017 9:40 AM Rafael Coy MD [facility]        Today's Diagnoses     Mantoux: positive    -  1       Follow-ups after your visit        Who to contact     If you have questions or need follow up information about today's clinic visit or your schedule please contact [facility] directly at [phone].  Normal or non-critical lab and imaging results will be communicated to you by [employer], letter or phone within 4 business days after the clinic has received the results. If you do not hear from us within 7 days, please contact the clinic through [employer] or phone. If you have a critical or abnormal lab result, we will notify you by phone as soon as possible.  Submit refill requests through [employer] or call your pharmacy and they will forward the refill request to us. Please allow 3 business days for your refill to be completed.          Additional Information About Your Visit        MyChart Information     [employer] gives you secure access to your electronic health record. If you see a primary care provider, you can also send messages to your care team and make appointments. If you have questions, please call your primary care clinic.  If you do not have a primary care provider, please call [phone] and they will assist you.        Care EveryWhere ID     This is your Care EveryWhere ID. This could be used by other organizations to access your [place] medical records  [id number]        Your Vitals Were     Pulse Temperature Height BMI (Body Mass Index)          76 98.1  F (36.7  C) (Tympanic) 5' 2.25\" (1.581 m) 49.89 kg/m2         Blood Pressure from Last 3 Encounters:   06/01/17 132/82   04/13/17 140/90   12/16/16 141/79    Weight from Last 3 Encounters: "   06/01/17 275 lb (124.7 kg)   12/16/16 273 lb 11.2 oz (124.1 kg)   11/14/16 270 lb 3.2 oz (122.6 kg)               Primary Care Provider Office Phone # Fax #    Darcy Yang [phone]       [facility] [address] [address]        Thank you!     Thank you for choosing [facility]  for your care. Our goal is always to provide you with excellent care. Hearing back from our patients is one way we can continue to improve our services. Please take a few minutes to complete the written survey that you may receive in the mail after your visit with us. Thank you!             Your Updated Medication List - Protect others around you: Learn how to safely use, store and throw away your medicines at www.disposemymeds.org.          This list is accurate as of: 6/1/17 10:45 AM.  Always use your most recent med list.                   Brand Name Dispense Instructions for use    ferrous sulfate 325 (65 FE) MG tablet    IRON    60 tablet    Take 1 tablet (325 mg) by mouth 2 times daily       MULTIVITAMIN PO          VITAMIN B-12 PO      1 TABLET ORALLY DAILY(HOME MED)       vitamin D 2000 UNITS tablet      Take 1 tablet by mouth daily

## 2017-06-01 NOTE — PROGRESS NOTES
SUBJECTIVE:                                                    Israel Zabala is a 36 year old female who presents to clinic today for the following health issues: COMES FOR CXR FOR FOLLOW UP OF POSITIVE MANTOUX.  NO SYMPTOMS       TB Screening       Duration: Since 5/9/17    Description (location/character/radiation): Patient states that her Mantoux screening came back as a low positive and needs a chest xray     Intensity:  mild    Accompanying signs and symptoms: none    History (similar episodes/previous evaluation): None    Precipitating or alleviating factors: None    Therapies tried and outcome: None           Problem list and histories reviewed & adjusted, as indicated.  Additional history: as documented    Patient Active Problem List   Diagnosis     Chronic maxillary sinusitis     CARDIOVASCULAR SCREENING; LDL GOAL LESS THAN 160     Increased BMI     Vulvar abnormality in pregnancy, delivered     Anxiety     Carcinoid tumor     Screening for cardiovascular condition     URI (upper respiratory infection)     CTS (carpal tunnel syndrome)     Obesity     Morbid obesity, unspecified obesity type (H)     Personal history of gastric bypass     Excessive bleeding in premenopausal period     Past Surgical History:   Procedure Laterality Date     BRONCHOSCOPY FLEXIBLE  11/1/2013    Procedure: BRONCHOSCOPY FLEXIBLE;  Flexible Bronchoscopy, Endobronchial Flexible Ultrasound;  Surgeon: Chris Sal MD;  Location: [facility] OR     C REMOVAL OF LUNG,BILOBECTOMY  11/22/2013    Right Middle and Lower Lobe [place]      ENDOBRONCHIAL ULTRASOUND FLEXIBLE  11/1/2013    Procedure: ENDOBRONCHIAL ULTRASOUND FLEXIBLE;;  Surgeon: Chris Sal MD;  Location: [facility] OR     GASTRIC BYPASS  2003     HERNIORRHAPHY INCISIONAL (LOCATION)  8/19/2011    Procedure:HERNIORRHAPHY INCISIONAL (LOCATION); Incisional Hernia Repair upper abdomen midline       THORACOTOMY      Carcinoid Tumor     TONSILLECTOMY  age 7       Social  "History   Substance Use Topics     Smoking status: Former Smoker     Packs/day: 0.10     Years: 8.00     Types: Cigarettes     Quit date: 9/26/2008     Smokeless tobacco: Never Used     Alcohol use Yes      Comment: occasional- quit with pregnancy     Family History   Problem Relation Age of Onset     Breast Cancer Maternal Grandmother 65     CANCER Maternal Grandmother      skin     DIABETES Maternal Grandfather      HEART DISEASE Paternal Grandmother      MI     CEREBROVASCULAR DISEASE Paternal Grandmother      HEART DISEASE Paternal Grandfather      MI     GASTROINTESTINAL DISEASE Brother      Colitis     Prostate Cancer Father      CANCER Mother      skin         Current Outpatient Prescriptions   Medication Sig Dispense Refill     ferrous sulfate (IRON) 325 (65 FE) MG tablet Take 1 tablet (325 mg) by mouth 2 times daily 60 tablet 2     Multiple Vitamins-Minerals (MULTIVITAMIN PO)        Cholecalciferol (VITAMIN D) 2000 UNITS tablet Take 1 tablet by mouth daily       VITAMIN B-12 PO 1 TABLET ORALLY DAILY(HOME MED)         Reviewed and updated as needed this visit by clinical staff  Tobacco  Allergies  Meds  Med Hx  Surg Hx  Fam Hx  Soc Hx      Reviewed and updated as needed this visit by Provider         ROS:  C: NEGATIVE for fever, chills, change in weight  E/M: NEGATIVE for ear, mouth and throat problems  R: NEGATIVE for significant cough or SOB  CV: NEGATIVE for chest pain, palpitations or peripheral edema    OBJECTIVE:                                                    /82 (BP Location: Right arm, Patient Position: Chair, Cuff Size: Adult Large)  Pulse 76  Temp 98.1  F (36.7  C) (Tympanic)  Ht 5' 2.25\" (1.581 m)  Wt 275 lb (124.7 kg)  BMI 49.89 kg/m2  Body mass index is 49.89 kg/(m^2).  GENERAL: healthy, alert and no distress  NECK: no adenopathy, no asymmetry, masses, or scars and thyroid normal to palpation  RESP: lungs clear to auscultation - no rales, rhonchi or wheezes  CV: regular rate " and rhythm, normal S1 S2, no S3 or S4, no murmur, click or rub, no peripheral edema and peripheral pulses strong         ASSESSMENT/PLAN:                                                        ASSESSMENT/PLAN:      ICD-10-CM    1. Mantoux: positive R76.11 XR Chest 2 Views       There are no Patient Instructions on file for this visit.            Rafael Coy MD  [facility]

## 2017-11-13 ENCOUNTER — OFFICE VISIT (OUTPATIENT)
Dept: OBGYN | Facility: CLINIC | Age: 37
End: 2017-11-13
Payer: COMMERCIAL

## 2017-11-13 VITALS
WEIGHT: 268 LBS | HEART RATE: 78 BPM | DIASTOLIC BLOOD PRESSURE: 81 MMHG | BODY MASS INDEX: 49.32 KG/M2 | HEIGHT: 62 IN | SYSTOLIC BLOOD PRESSURE: 140 MMHG

## 2017-11-13 DIAGNOSIS — N64.52 NIPPLE DISCHARGE IN FEMALE: ICD-10-CM

## 2017-11-13 DIAGNOSIS — Z01.419 ENCOUNTER FOR GYNECOLOGICAL EXAMINATION WITHOUT ABNORMAL FINDING: Primary | ICD-10-CM

## 2017-11-13 DIAGNOSIS — N92.4 EXCESSIVE BLEEDING IN PREMENOPAUSAL PERIOD: ICD-10-CM

## 2017-11-13 PROBLEM — R03.0 WHITE COAT SYNDROME WITHOUT DIAGNOSIS OF HYPERTENSION: Status: ACTIVE | Noted: 2017-11-13

## 2017-11-13 PROCEDURE — 99213 OFFICE O/P EST LOW 20 MIN: CPT | Mod: 25 | Performed by: OBSTETRICS & GYNECOLOGY

## 2017-11-13 PROCEDURE — 99395 PREV VISIT EST AGE 18-39: CPT | Performed by: OBSTETRICS & GYNECOLOGY

## 2017-11-13 NOTE — MR AVS SNAPSHOT
After Visit Summary   11/13/2017    Israel Zabala    MRN: [id number]           Patient Information     Date Of Birth          1980        Visit Information        Provider Department      11/13/2017 11:15 AM Indu Mendez MD [facility]        Today's Diagnoses     Encounter for gynecological examination without abnormal finding    -  1    Nipple discharge in female        Excessive bleeding in premenopausal period          Care Instructions      Preventive Health Recommendations  Female Ages 26 - 39  Yearly exam:   See your health care provider every year in order to    Review health changes.     Discuss preventive care.      Review your medicines if you your doctor has prescribed any.    Until age 30: Get a Pap test every three years (more often if you have had an abnormal result).    After age 30: Talk to your doctor about whether you should have a Pap test every 3 years or have a Pap test with HPV screening every 5 years.   You do not need a Pap test if your uterus was removed (hysterectomy) and you have not had cancer.  You should be tested each year for STDs (sexually transmitted diseases), if you're at risk.   Talk to your provider about how often to have your cholesterol checked.  If you are at risk for diabetes, you should have a diabetes test (fasting glucose).  Shots: Get a flu shot each year. Get a tetanus shot every 10 years.   Nutrition:     Eat at least 5 servings of fruits and vegetables each day.    Eat whole-grain bread, whole-wheat pasta and brown rice instead of white grains and rice.    Talk to your provider about Calcium and Vitamin D.     Lifestyle    Exercise at least 150 minutes a week (30 minutes a day, 5 days of the week). This will help you control your weight and prevent disease.    Limit alcohol to one drink per day.    No smoking.     Wear sunscreen to prevent skin cancer.    See your dentist every six months for an exam and cleaning.             "Follow-ups after your visit        Future tests that were ordered for you today     Open Future Orders        Priority Expected Expires Ordered    MA Diagnostic Digital Left Routine  11/13/2018 11/13/2017    US Breast Left Complete 4 Quadrants Routine  11/13/2018 11/13/2017            Who to contact     If you have questions or need follow up information about today's clinic visit or your schedule please contact [facility] directly at [phone].  Normal or non-critical lab and imaging results will be communicated to you by [employer], letter or phone within 4 business days after the clinic has received the results. If you do not hear from us within 7 days, please contact the clinic through [employer] or phone. If you have a critical or abnormal lab result, we will notify you by phone as soon as possible.  Submit refill requests through [employer] or call your pharmacy and they will forward the refill request to us. Please allow 3 business days for your refill to be completed.          Additional Information About Your Visit        [employer] Information     [employer] gives you secure access to your electronic health record. If you see a primary care provider, you can also send messages to your care team and make appointments. If you have questions, please call your primary care clinic.  If you do not have a primary care provider, please call [phone] and they will assist you.        Care EveryWhere ID     This is your Care EveryWhere ID. This could be used by other organizations to access your [place] medical records  [id number]        Your Vitals Were     Pulse Height Last Period Breastfeeding? BMI (Body Mass Index)       78 5' 2.25\" (1.581 m) 10/14/2017 No 48.62 kg/m2        Blood Pressure from Last 3 Encounters:   11/13/17 140/81   06/01/17 132/82   04/13/17 140/90    Weight from Last 3 Encounters:   11/13/17 268 lb (121.6 kg)   06/01/17 275 lb (124.7 kg)   12/16/16 273 lb 11.2 oz (124.1 kg)            "   We Performed the Following     OFFICE/OUTPT VISIT,MICHELL GOODWIN III        Primary Care Provider Office Phone # Fax #    Darcy Yang [phone] [address] [facility] [address]        Equal Access to Services     DOMONIQUE ALEJANDRO : Hadii aad ku hadasho Soomaali, waaxda luqadaha, qaybta kaalmada adeegyada, waxay idiin hayaan adearnold bondtaishapete awad. So [facility] [phone].    ATENCIÓN: Si habla español, tiene a alas disposición servicios gratuitos de asistencia lingüística. Llame al [phone].    We comply with applicable federal civil rights laws and [place] laws. We do not discriminate on the basis of race, color, national origin, age, disability, sex, sexual orientation, or gender identity.            Thank you!     Thank you for choosing [facility]  for your care. Our goal is always to provide you with excellent care. Hearing back from our patients is one way we can continue to improve our services. Please take a few minutes to complete the written survey that you may receive in the mail after your visit with us. Thank you!             Your Updated Medication List - Protect others around you: Learn how to safely use, store and throw away your medicines at www.disposemymeds.org.          This list is accurate as of: 11/13/17 12:57 PM.  Always use your most recent med list.                   Brand Name Dispense Instructions for use Diagnosis    ferrous sulfate 325 (65 FE) MG tablet    IRON    60 tablet    Take 1 tablet (325 mg) by mouth 2 times daily        MULTIVITAMIN PO           VITAMIN B-12 PO      1 TABLET ORALLY DAILY(HOME MED)        vitamin D 2000 UNITS tablet      Take 1 tablet by mouth daily

## 2017-11-13 NOTE — NURSING NOTE
"Initial /81 (BP Location: Right arm, Patient Position: Chair, Cuff Size: Adult Large)  Pulse 78  Ht 5' 2.25\" (1.581 m)  Wt 268 lb (121.6 kg)  LMP 10/14/2017  Breastfeeding? No  BMI 48.62 kg/m2 Estimated body mass index is 48.62 kg/(m^2) as calculated from the following:    Height as of this encounter: 5' 2.25\" (1.581 m).    Weight as of this encounter: 268 lb (121.6 kg). .    Nita Luke    "

## 2017-11-13 NOTE — PROGRESS NOTES
SUBJECTIVE:   CC: Israel Zabala is an 37 year old woman who presents for preventive health visit.     Notes monthly menses, but that they area spacing out a little.  Her periods remain very heavy though.  Has had prior discussions regarding heavy menses and is considering hysterectomy.    Also notes ongoing left nipple discharge.  Only when she squeezes.  Normal prolactin last year.     Healthy Habits:    Do you get at least three servings of calcium containing foods daily (dairy, green leafy vegetables, etc.)? yes    Amount of exercise or daily activities, outside of work: 2 day(s) per week    Problems taking medications regularly No    Medication side effects: No    Have you had an eye exam in the past two years? yes    Do you see a dentist twice per year? yes    Do you have sleep apnea, excessive snoring or daytime drowsiness?no          Nipple discharge, periods more irregular    Today's PHQ-2 Score: PHQ-2 ( 1999 Pf[employer]) 11/13/2017 11/8/2016   Q1: Little interest or pleasure in doing things 0 0   Q2: Feeling down, depressed or hopeless 0 0   PHQ-2 Score 0 0         Abuse: Current or Past(Physical, Sexual or Emotional)- No  Do you feel safe in your environment - Yes  Social History   Substance Use Topics     Smoking status: Former Smoker     Packs/day: 0.10     Years: 8.00     Types: Cigarettes     Quit date: 9/26/2008     Smokeless tobacco: Never Used     Alcohol use Yes      Comment: occasional- quit with pregnancy     The patient does not drink >3 drinks per day nor >7 drinks per week.    Reviewed orders with patient.  Reviewed health maintenance and updated orders accordingly - Yes  Labs reviewed in EPIC  BP Readings from Last 3 Encounters:   11/13/17 140/81   06/01/17 132/82   04/13/17 140/90    Wt Readings from Last 3 Encounters:   11/13/17 268 lb (121.6 kg)   06/01/17 275 lb (124.7 kg)   12/16/16 273 lb 11.2 oz (124.1 kg)                      Patient under age 50, mutual decision reflected in health  "maintenance.        Pertinent mammograms are reviewed under the imaging tab.  History of abnormal Pap smear:   NO - age 30- 65 PAP every 3 years recommended  Last 3 Pap Results:   PAP (no units)   Date Value   09/14/2015 NIL   04/05/2012 NIL   09/26/2011 NIL       Reviewed and updated as needed this visit by clinical staffTobacco  Allergies  Meds  Med Hx  Surg Hx  Fam Hx  Soc Hx        Reviewed and updated as needed this visit by Provider              ROS:  C: NEGATIVE for fever, chills, change in weight  I: NEGATIVE for worrisome rashes, moles or lesions  E: NEGATIVE for vision changes or irritation  ENT: NEGATIVE for ear, mouth and throat problems  R: NEGATIVE for significant cough or SOB  B: NEGATIVE for masses, tenderness or discharge  CV: NEGATIVE for chest pain, palpitations or peripheral edema  GI: NEGATIVE for nausea, abdominal pain, heartburn, or change in bowel habits   female: no unusual urinary symptoms  M: NEGATIVE for significant arthralgias or myalgia  N: NEGATIVE for weakness, dizziness or paresthesias  P: NEGATIVE for changes in mood or affect    OBJECTIVE:   /81 (BP Location: Right arm, Patient Position: Chair, Cuff Size: Adult Large)  Pulse 78  Ht 5' 2.25\" (1.581 m)  Wt 268 lb (121.6 kg)  LMP 10/14/2017  Breastfeeding? No  BMI 48.62 kg/m2  EXAM:  GENERAL: healthy, alert and no distress  EYES: Eyes grossly normal to inspection  NECK: no adenopathy, no asymmetry, masses, or scars and thyroid normal to palpation  RESP: lungs clear to auscultation - no rales, rhonchi or wheezes  BREAST: normal without masses, tenderness or nipple discharge and no palpable axillary masses or adenopathy  CV: regular rate and rhythm, normal S1 S2, no S3 or S4, no murmur, click or rub, no peripheral edema and peripheral pulses strong  ABDOMEN: soft, nontender, no hepatosplenomegaly, no masses and bowel sounds normal   (female): normal female external genitalia, normal urethral meatus, vaginal mucosa " pink, moist, well rugated, and normal cervix/adnexa/uterus without masses or discharge  MS: no gross musculoskeletal defects noted, no edema  SKIN: no suspicious lesions or rashes  NEURO: Normal strength and tone, mentation intact and speech normal  PSYCH: mentation appears normal, affect normal/bright    ASSESSMENT/PLAN:   1. Encounter for gynecological examination without abnormal finding  Routine health maintenance reviewed    2. Nipple discharge in female  Discussed that this is likely related to her squeezing, but will get imaging studies in the meantime.   - MA Diagnostic Digital Left; Future  - US Breast Left Complete 4 Quadrants; Future    3. Excessive bleeding in premenopausal period  Reviewed risks and benefits of medical versus surgical therapy.  Medical therapy reviewed included hormonal manipulation with OCP's, Patch, Ring, Depo, or IUD.   Patient is a poor candidate for combined hormonal contraception due to her borderline htn.  Discussed progestin options including Mirena.  Reviewed option of Lysteda as well.  Reviewed endometrial ablation versus hysterectomy.  Discussed that endometrial ablation is minimally invasive compared to hysterectomy but may not be definitive.  Reviewed that ablation is not a form of contraception and that she would need some form of contraception if ablation performed.  Patient is considering hysterectomy most likely.     After physical was completed I spent and additional 15 minutes with the pt discussing her history, diagnosis, and follow-up plan as noted above.   Over 50% of the visit was spent in counseling and coordination of care       COUNSELING:   Reviewed preventive health counseling, as reflected in patient instructions  Special attention given to:        Regular exercise       Healthy diet/nutrition       Vision screening       Alcohol Use       Contraception       Family planning       Folic Acid Counseling       Osteoporosis Prevention/Bone Health       Safe sex  "practices/STD prevention       (Paz)menopause management          BP Screening:   Last 3 BP Readings:    BP Readings from Last 3 Encounters:   11/13/17 140/81   06/01/17 132/82   04/13/17 140/90       The following was recommended to the patient:  Re-screen within 4 weeks and recommend lifestyle modifications   reports that she quit smoking about 9 years ago. Her smoking use included Cigarettes. She has a 0.80 pack-year smoking history. She has never used smokeless tobacco.    Estimated body mass index is 48.62 kg/(m^2) as calculated from the following:    Height as of this encounter: 5' 2.25\" (1.581 m).    Weight as of this encounter: 268 lb (121.6 kg).   Weight management plan: Discussed healthy diet and exercise guidelines and patient will follow up in 12 months in clinic to re-evaluate.    Counseling Resources:  ATP IV Guidelines  Pooled Cohorts Equation Calculator  Breast Cancer Risk Calculator  FRAX Risk Assessment  ICSI Preventive Guidelines  Dietary Guidelines for Americans, 2010  USDA's MyPlate  ASA Prophylaxis  Lung CA Screening    Indu Mendez MD  [facility]  "

## 2017-12-01 ENCOUNTER — HOSPITAL ENCOUNTER (OUTPATIENT)
Dept: MAMMOGRAPHY | Facility: CLINIC | Age: 37
Discharge: HOME | End: 2017-12-01
Attending: OBSTETRICS & GYNECOLOGY | Admitting: OBSTETRICS & GYNECOLOGY
Payer: COMMERCIAL

## 2017-12-01 DIAGNOSIS — N64.52 NIPPLE DISCHARGE IN FEMALE: ICD-10-CM

## 2017-12-01 PROCEDURE — G0204 DX MAMMO INCL CAD BI: HCPCS

## 2018-02-26 ENCOUNTER — OFFICE VISIT (OUTPATIENT)
Dept: FAMILY MEDICINE | Facility: CLINIC | Age: 38
End: 2018-02-26
Payer: COMMERCIAL

## 2018-02-26 VITALS
BODY MASS INDEX: 49.69 KG/M2 | TEMPERATURE: 98.5 F | HEIGHT: 62 IN | WEIGHT: 270 LBS | SYSTOLIC BLOOD PRESSURE: 130 MMHG | HEART RATE: 72 BPM | DIASTOLIC BLOOD PRESSURE: 80 MMHG

## 2018-02-26 DIAGNOSIS — R30.0 DYSURIA: Primary | ICD-10-CM

## 2018-02-26 DIAGNOSIS — R39.9 URINARY SYMPTOM OR SIGN: ICD-10-CM

## 2018-02-26 LAB
ALBUMIN UR-MCNC: NEGATIVE MG/DL
APPEARANCE UR: CLEAR
BILIRUB UR QL STRIP: NEGATIVE
COLOR UR AUTO: YELLOW
GLUCOSE UR STRIP-MCNC: NEGATIVE MG/DL
HGB UR QL STRIP: NEGATIVE
KETONES UR STRIP-MCNC: NEGATIVE MG/DL
LEUKOCYTE ESTERASE UR QL STRIP: NEGATIVE
NITRATE UR QL: NEGATIVE
PH UR STRIP: 5.5 PH (ref 5–7)
SOURCE: NORMAL
SP GR UR STRIP: 1.02 (ref 1–1.03)
SPECIMEN SOURCE: NORMAL
UROBILINOGEN UR STRIP-ACNC: 0.2 EU/DL (ref 0.2–1)
WET PREP SPEC: NORMAL

## 2018-02-26 PROCEDURE — 81003 URINALYSIS AUTO W/O SCOPE: CPT | Performed by: NURSE PRACTITIONER

## 2018-02-26 PROCEDURE — 99213 OFFICE O/P EST LOW 20 MIN: CPT | Performed by: NURSE PRACTITIONER

## 2018-02-26 PROCEDURE — 87210 SMEAR WET MOUNT SALINE/INK: CPT | Performed by: NURSE PRACTITIONER

## 2018-02-26 NOTE — PROGRESS NOTES
SUBJECTIVE:   Israel Zabala is a 37 year old female who presents to clinic today for the following health issues:    URINARY TRACT SYMPTOMS  Onset: three days    Description:   Painful urination (Dysuria): YES  Blood in urine (Hematuria): YES  Delay in urine (Hesitency): no     Intensity: moderate    Progression of Symptoms:  worsening    Accompanying Signs & Symptoms:  Fever/chills: no   Flank pain YES  Nausea and vomiting: no   Any vaginal symptoms: vaginal itching  Abdominal/Pelvic Pain: YES    History:   History of frequent UTI's: no   History of kidney stones: no   Sexually Active: YES  Possibility of pregnancy: No    Precipitating factors:   None    Therapies Tried and outcome: None      Problem list and histories reviewed & adjusted, as indicated.  Additional history: as documented    Patient Active Problem List   Diagnosis     CARDIOVASCULAR SCREENING; LDL GOAL LESS THAN 160     Carcinoid tumor     Morbid obesity, unspecified obesity type (H)     Personal history of gastric bypass     Excessive bleeding in premenopausal period     White coat syndrome without diagnosis of hypertension     Nipple discharge in female     Past Surgical History:   Procedure Laterality Date     BRONCHOSCOPY FLEXIBLE  11/1/2013    related to carcinoid;  Flexible Bronchoscopy, Endobronchial Flexible Ultrasound;  Surgeon: Chris Sal MD;  Location: [facility] OR     C REMOVAL OF LUNG,BILOBECTOMY Right 11/22/2013    Carcinoid: Right Middle and Lower Lobe     ENDOBRONCHIAL ULTRASOUND FLEXIBLE  11/1/2013    Procedure: ENDOBRONCHIAL ULTRASOUND FLEXIBLE;;  Surgeon: Chris Sal MD;  Location: [facility] OR     GASTRIC BYPASS  2003     HERNIORRHAPHY INCISIONAL (LOCATION)  8/19/2011    Procedure:HERNIORRHAPHY INCISIONAL (LOCATION); Incisional Hernia Repair upper abdomen midline       TONSILLECTOMY  age 7       Social History   Substance Use Topics     Smoking status: Former Smoker     Packs/day: 0.10     Years: 8.00      "Types: Cigarettes     Quit date: 9/26/2008     Smokeless tobacco: Never Used     Alcohol use Yes      Comment: occasional     Family History   Problem Relation Age of Onset     Breast Cancer Maternal Grandmother 65     CANCER Maternal Grandmother      skin     DIABETES Maternal Grandfather      HEART DISEASE Paternal Grandmother      MI     CEREBROVASCULAR DISEASE Paternal Grandmother      HEART DISEASE Paternal Grandfather      MI     GASTROINTESTINAL DISEASE Brother      Colitis     Prostate Cancer Father      CANCER Mother      skin     Congenital Anomalies Daughter      imperforate anus         Current Outpatient Prescriptions   Medication Sig Dispense Refill     ferrous sulfate (IRON) 325 (65 FE) MG tablet Take 1 tablet (325 mg) by mouth 2 times daily 60 tablet 2     Multiple Vitamins-Minerals (MULTIVITAMIN PO)        Cholecalciferol (VITAMIN D) 2000 UNITS tablet Take 1 tablet by mouth daily       VITAMIN B-12 PO 1 TABLET ORALLY DAILY(HOME MED)       No Known Allergies    Reviewed and updated as needed this visit by clinical staff       Reviewed and updated as needed this visit by Provider         ROS:  Constitutional, HEENT, cardiovascular, pulmonary, gi and gu systems are negative, except as otherwise noted.    OBJECTIVE:     /80 (BP Location: Right arm, Cuff Size: Adult Large)  Pulse 72  Temp 98.5  F (36.9  C) (Tympanic)  Ht 5' 2.25\" (1.581 m)  Wt 270 lb (122.5 kg)  BMI 48.99 kg/m2  Body mass index is 48.99 kg/(m^2).   GENERAL: healthy, alert and no distress  EYES: Eyes grossly normal to inspection, PERRL and conjunctivae and sclerae normal  HENT: ear canals and TM's normal, nose and mouth without ulcers or lesions  NECK: no adenopathy, no asymmetry, masses, or scars and thyroid normal to palpation  RESP: lungs clear to auscultation - no rales, rhonchi or wheezes  CV: regular rate and rhythm, normal S1 S2, no S3 or S4, no murmur, click or rub, no peripheral edema and peripheral pulses strong  MS: " no gross musculoskeletal defects noted, no edema  SKIN: no suspicious lesions or rashes  NEURO: Normal strength and tone, mentation intact and speech normal  PSYCH: mentation appears normal, affect normal/bright    Results for orders placed or performed in visit on 02/26/18   *UA reflex to Microscopic and Culture ([facility] (except [place] and [place])   Result Value Ref Range    Color Urine Yellow     Appearance Urine Clear     Glucose Urine Negative NEG^Negative mg/dL    Bilirubin Urine Negative NEG^Negative    Ketones Urine Negative NEG^Negative mg/dL    Specific Gravity Urine 1.025 1.003 - 1.035    Blood Urine Negative NEG^Negative    pH Urine 5.5 5.0 - 7.0 pH    Protein Albumin Urine Negative NEG^Negative mg/dL    Urobilinogen Urine 0.2 0.2 - 1.0 EU/dL    Nitrite Urine Negative NEG^Negative    Leukocyte Esterase Urine Negative NEG^Negative    Source Midstream Urine    Wet prep   Result Value Ref Range    Specimen Description Vagina     Wet Prep No yeast seen     Wet Prep No cells seen     Wet Prep No Trichomonas seen          ASSESSMENT:       ICD-10-CM    1. Dysuria R30.0    2. Urinary symptom or sign R39.9 *UA reflex to Microscopic and Culture ([facility] (except [place] and [place])     Wet prep         PLAN:     Patient Instructions     Dysuria with Uncertain Cause (Adult)    The urethra is the tube that allows urine to pass out of the body. In a woman, the urethra is the opening above the vagina. In men, the urethra is the opening on the tip of the penis. Dysuria is the feeling of pain or burning in the urethra when passing urine.  Dysuria can be caused by anything that irritates or inflames the urethra. An infection or chemical irritation can cause this reaction. A bladder infection is the most common cause of dysuria in adults. A urine test can diagnose this. A bladder infection needs antibiotic treatment.  Soaps, lotions, colognes and feminine hygiene products  can cause dysuria. So can birth control jellies, creams, and foams. It will go away 1 to 3 days after using these irritants.  Sexually transmitted diseases (STDs) such as chlamydia or gonorrhea can cause dysuria. Your healthcare provider may take a culture sample. Your provider may start you on antibiotic medicine before the culture test returns.  In women who have gone through menopause, dysuria can be from dryness in the lining of the urethra. This can be treated with hormones. Dysuria becomes long-term (chronic) when it lasts for weeks or months. You may need to see a specialist (urologist) to diagnose and treat chronic dysuria.  Home care  These home care tips may help:    Don't use any chemicals or products that you think may be causing your symptoms.    If you were given a prescription medicine, take as directed. Be sure to take it until it is all used up.    If a culture was taken, don't have sex until you have been told that it is negative. This means you don't have an infection. Then follow your healthcare provider's advice to treat your condition.  If a culture was done and it is positive:    Both you and your sexual partner may need to be treated. This is true even if your partner has no symptoms.    Contact your healthcare provider or go to an urgent care clinic or the public health department to be looked at and treated.    Don't have sex until both you and your partner(s) have finished all antibiotics and your healthcare provider says you are no longer contagious.    Learn about and use safe sex practices. The safest sex is with a partner who has tested negative and only has sex with you. Condoms can prevent STDs from spreading, but they aren't a guarantee.  Follow-up care  Follow up with your healthcare provider, or as advised. If a culture was taken, you may call as directed for the results. If you have an STD, follow up with your provider or the public health department for a complete STD screening,  including HIV testing. For more information, contact CDC-INFO at 555-916-2342.  When to seek medical advice  Call your healthcare provider right away if any of these occur:    You aren't better after 3 days of treatment    Fever of 100.4 F (38 C) or higher, or as directed by your healthcare provider    Back or belly pain that gets worse    You can't urinate because of pain    New discharge from the urethra, vagina, or penis    Painful sores on the penis    Rash or joint pain    Painful lumps (lymph nodes) in the groin    Testicle pain or swelling of the scrotum  Date Last Reviewed: 11/1/2016 2000-2017 [employer]. [address] [place] [address], [place] [address]. All rights reserved. This information is not intended as a substitute for professional medical care. Always follow your healthcare professional's instructions.          Judie RHODES SAME DAY PROVIDER  [facility]

## 2018-02-26 NOTE — NURSING NOTE
"Chief Complaint   Patient presents with     Urinary Problem       Initial /80 (BP Location: Right arm, Cuff Size: Adult Large)  Pulse 72  Temp 98.5  F (36.9  C) (Tympanic) Estimated body mass index is 48.62 kg/(m^2) as calculated from the following:    Height as of 11/13/17: 5' 2.25\" (1.581 m).    Weight as of 11/13/17: 268 lb (121.6 kg).      Health Maintenance that is potentially due pending provider review:  NONE    Is there anyone who you would like to be able to receive your results? No  If yes have patient fill out MICH      "

## 2018-02-26 NOTE — MR AVS SNAPSHOT
After Visit Summary   2/26/2018    Israel Zabala    MRN: [id number]           Patient Information     Date Of Birth          1980        Visit Information        Provider Department      2/26/2018 11:20 AM Judie Severino APRN [facility]        Today's Diagnoses     Dysuria    -  1    Urinary symptom or sign          Care Instructions      Dysuria with Uncertain Cause (Adult)    The urethra is the tube that allows urine to pass out of the body. In a woman, the urethra is the opening above the vagina. In men, the urethra is the opening on the tip of the penis. Dysuria is the feeling of pain or burning in the urethra when passing urine.  Dysuria can be caused by anything that irritates or inflames the urethra. An infection or chemical irritation can cause this reaction. A bladder infection is the most common cause of dysuria in adults. A urine test can diagnose this. A bladder infection needs antibiotic treatment.  Soaps, lotions, colognes and feminine hygiene products can cause dysuria. So can birth control jellies, creams, and foams. It will go away 1 to 3 days after using these irritants.  Sexually transmitted diseases (STDs) such as chlamydia or gonorrhea can cause dysuria. Your healthcare provider may take a culture sample. Your provider may start you on antibiotic medicine before the culture test returns.  In women who have gone through menopause, dysuria can be from dryness in the lining of the urethra. This can be treated with hormones. Dysuria becomes long-term (chronic) when it lasts for weeks or months. You may need to see a specialist (urologist) to diagnose and treat chronic dysuria.  Home care  These home care tips may help:    Don't use any chemicals or products that you think may be causing your symptoms.    If you were given a prescription medicine, take as directed. Be sure to take it until it is all used up.    If a culture was taken, don't have sex  until you have been told that it is negative. This means you don't have an infection. Then follow your healthcare provider's advice to treat your condition.  If a culture was done and it is positive:    Both you and your sexual partner may need to be treated. This is true even if your partner has no symptoms.    Contact your healthcare provider or go to an urgent care clinic or the public health department to be looked at and treated.    Don't have sex until both you and your partner(s) have finished all antibiotics and your healthcare provider says you are no longer contagious.    Learn about and use safe sex practices. The safest sex is with a partner who has tested negative and only has sex with you. Condoms can prevent STDs from spreading, but they aren't a guarantee.  Follow-up care  Follow up with your healthcare provider, or as advised. If a culture was taken, you may call as directed for the results. If you have an STD, follow up with your provider or the public health department for a complete STD screening, including HIV testing. For more information, contact CDC-INFO at 099-704-9942.  When to seek medical advice  Call your healthcare provider right away if any of these occur:    You aren't better after 3 days of treatment    Fever of 100.4 F (38 C) or higher, or as directed by your healthcare provider    Back or belly pain that gets worse    You can't urinate because of pain    New discharge from the urethra, vagina, or penis    Painful sores on the penis    Rash or joint pain    Painful lumps (lymph nodes) in the groin    Testicle pain or swelling of the scrotum  Date Last Reviewed: 11/1/2016 2000-2017 [employer]. [address] [place] [address], [place] [address]. All rights reserved. This information is not intended as a substitute for professional medical care. Always follow your healthcare professional's instructions.                Follow-ups after your visit        Who to contact     If  "you have questions or need follow up information about today's clinic visit or your schedule please contact [facility] directly at [phone].  Normal or non-critical lab and imaging results will be communicated to you by MyChart, letter or phone within 4 business days after the clinic has received the results. If you do not hear from us within 7 days, please contact the clinic through [employer] or phone. If you have a critical or abnormal lab result, we will notify you by phone as soon as possible.  Submit refill requests through [employer] or call your pharmacy and they will forward the refill request to us. Please allow 3 business days for your refill to be completed.          Additional Information About Your Visit        [employer] Information     [employer] gives you secure access to your electronic health record. If you see a primary care provider, you can also send messages to your care team and make appointments. If you have questions, please call your primary care clinic.  If you do not have a primary care provider, please call [phone] and they will assist you.        Care EveryWhere ID     This is your Care EveryWhere ID. This could be used by other organizations to access your [facility] medical records  [id number]        Your Vitals Were     Pulse Temperature Height BMI (Body Mass Index)          72 98.5  F (36.9  C) (Tympanic) 5' 2.25\" (1.581 m) 48.99 kg/m2         Blood Pressure from Last 3 Encounters:   02/26/18 130/80   11/13/17 140/81   06/01/17 132/82    Weight from Last 3 Encounters:   02/26/18 270 lb (122.5 kg)   11/13/17 268 lb (121.6 kg)   06/01/17 275 lb (124.7 kg)              We Performed the Following     *UA reflex to Microscopic and Culture ([facility] [facility] (except [place] and [place])     Wet prep        Primary Care Provider Office Phone # Fax #    Darcy Yang [phone] [address] [facility] [address]        Equal " Access to Services     [facility]: Hadii aad ku hadpaigejayden Vann, wajaylinda luqdee deeha, qamatt tabbyfaisalblaire caban. [facility] [phone].    ATENCIÓN: Si habla español, tiene a alas disposición servicios gratuitos de asistencia lingüística. Llame al [phone].    We comply with applicable federal civil rights laws and [place] laws. We do not discriminate on the basis of race, color, national origin, age, disability, sex, sexual orientation, or gender identity.            Thank you!     Thank you for choosing [facility]  for your care. Our goal is always to provide you with excellent care. Hearing back from our patients is one way we can continue to improve our services. Please take a few minutes to complete the written survey that you may receive in the mail after your visit with us. Thank you!             Your Updated Medication List - Protect others around you: Learn how to safely use, store and throw away your medicines at www.disposemymeds.org.          This list is accurate as of 2/26/18 11:47 AM.  Always use your most recent med list.                   Brand Name Dispense Instructions for use Diagnosis    ferrous sulfate 325 (65 FE) MG tablet    IRON    60 tablet    Take 1 tablet (325 mg) by mouth 2 times daily        MULTIVITAMIN PO           VITAMIN B-12 PO      1 TABLET ORALLY DAILY(HOME MED)        vitamin D 2000 UNITS tablet      Take 1 tablet by mouth daily

## 2018-02-26 NOTE — PATIENT INSTRUCTIONS
Dysuria with Uncertain Cause (Adult)    The urethra is the tube that allows urine to pass out of the body. In a woman, the urethra is the opening above the vagina. In men, the urethra is the opening on the tip of the penis. Dysuria is the feeling of pain or burning in the urethra when passing urine.  Dysuria can be caused by anything that irritates or inflames the urethra. An infection or chemical irritation can cause this reaction. A bladder infection is the most common cause of dysuria in adults. A urine test can diagnose this. A bladder infection needs antibiotic treatment.  Soaps, lotions, colognes and feminine hygiene products can cause dysuria. So can birth control jellies, creams, and foams. It will go away 1 to 3 days after using these irritants.  Sexually transmitted diseases (STDs) such as chlamydia or gonorrhea can cause dysuria. Your healthcare provider may take a culture sample. Your provider may start you on antibiotic medicine before the culture test returns.  In women who have gone through menopause, dysuria can be from dryness in the lining of the urethra. This can be treated with hormones. Dysuria becomes long-term (chronic) when it lasts for weeks or months. You may need to see a specialist (urologist) to diagnose and treat chronic dysuria.  Home care  These home care tips may help:    Don't use any chemicals or products that you think may be causing your symptoms.    If you were given a prescription medicine, take as directed. Be sure to take it until it is all used up.    If a culture was taken, don't have sex until you have been told that it is negative. This means you don't have an infection. Then follow your healthcare provider's advice to treat your condition.  If a culture was done and it is positive:    Both you and your sexual partner may need to be treated. This is true even if your partner has no symptoms.    Contact your healthcare provider or go to an urgent care clinic or the  [facility] health department to be looked at and treated.    Don't have sex until both you and your partner(s) have finished all antibiotics and your healthcare provider says you are no longer contagious.    Learn about and use safe sex practices. The safest sex is with a partner who has tested negative and only has sex with you. Condoms can prevent STDs from spreading, but they aren't a guarantee.  Follow-up care  Follow up with your healthcare provider, or as advised. If a culture was taken, you may call as directed for the results. If you have an STD, follow up with your provider or the public health department for a complete STD screening, including HIV testing. For more information, contact CDC-INFO at 056-841-0398.  When to seek medical advice  Call your healthcare provider right away if any of these occur:    You aren't better after 3 days of treatment    Fever of 100.4 F (38 C) or higher, or as directed by your healthcare provider    Back or belly pain that gets worse    You can't urinate because of pain    New discharge from the urethra, vagina, or penis    Painful sores on the penis    Rash or joint pain    Painful lumps (lymph nodes) in the groin    Testicle pain or swelling of the scrotum  Date Last Reviewed: 11/1/2016 2000-2017 [employer]. [address] [place] [address], [place] [address]. All rights reserved. This information is not intended as a substitute for professional medical care. Always follow your healthcare professional's instructions.

## 2018-11-09 ENCOUNTER — HOSPITAL ENCOUNTER (OUTPATIENT)
Dept: MAMMOGRAPHY | Facility: CLINIC | Age: 38
Discharge: HOME | End: 2018-11-09
Attending: OBSTETRICS & GYNECOLOGY | Admitting: OBSTETRICS & GYNECOLOGY
Payer: COMMERCIAL

## 2018-11-09 DIAGNOSIS — Z12.31 VISIT FOR SCREENING MAMMOGRAM: ICD-10-CM

## 2018-11-09 PROCEDURE — 77067 SCR MAMMO BI INCL CAD: CPT

## 2018-11-12 ENCOUNTER — OFFICE VISIT (OUTPATIENT)
Dept: OBGYN | Facility: CLINIC | Age: 38
End: 2018-11-12
Payer: COMMERCIAL

## 2018-11-12 VITALS
DIASTOLIC BLOOD PRESSURE: 96 MMHG | SYSTOLIC BLOOD PRESSURE: 124 MMHG | HEART RATE: 83 BPM | TEMPERATURE: 98.1 F | BODY MASS INDEX: 50.26 KG/M2 | WEIGHT: 277 LBS

## 2018-11-12 DIAGNOSIS — Z01.419 WELL FEMALE EXAM WITH ROUTINE GYNECOLOGICAL EXAM: Primary | ICD-10-CM

## 2018-11-12 DIAGNOSIS — R63.5 WEIGHT GAIN: ICD-10-CM

## 2018-11-12 DIAGNOSIS — B37.31 YEAST INFECTION OF THE VAGINA: ICD-10-CM

## 2018-11-12 LAB
CHOLEST SERPL-MCNC: 266 MG/DL
GLUCOSE SERPL-MCNC: 121 MG/DL (ref 70–99)
HDLC SERPL-MCNC: 58 MG/DL
LDLC SERPL CALC-MCNC: 174 MG/DL
NONHDLC SERPL-MCNC: 208 MG/DL
SPECIMEN SOURCE: ABNORMAL
TRIGL SERPL-MCNC: 168 MG/DL
WET PREP SPEC: ABNORMAL

## 2018-11-12 PROCEDURE — 80061 LIPID PANEL: CPT | Performed by: OBSTETRICS & GYNECOLOGY

## 2018-11-12 PROCEDURE — 82947 ASSAY GLUCOSE BLOOD QUANT: CPT | Performed by: OBSTETRICS & GYNECOLOGY

## 2018-11-12 PROCEDURE — 36415 COLL VENOUS BLD VENIPUNCTURE: CPT | Performed by: OBSTETRICS & GYNECOLOGY

## 2018-11-12 PROCEDURE — 87210 SMEAR WET MOUNT SALINE/INK: CPT | Performed by: OBSTETRICS & GYNECOLOGY

## 2018-11-12 PROCEDURE — 99214 OFFICE O/P EST MOD 30 MIN: CPT | Performed by: OBSTETRICS & GYNECOLOGY

## 2018-11-12 PROCEDURE — 87624 HPV HI-RISK TYP POOLED RSLT: CPT | Performed by: OBSTETRICS & GYNECOLOGY

## 2018-11-12 PROCEDURE — G0145 SCR C/V CYTO,THINLAYER,RESCR: HCPCS | Performed by: OBSTETRICS & GYNECOLOGY

## 2018-11-12 RX ORDER — FLUCONAZOLE 150 MG/1
150 TABLET ORAL ONCE
Qty: 1 TABLET | Refills: 0 | Status: SHIPPED | OUTPATIENT
Start: 2018-11-12 | End: 2019-04-15

## 2018-11-12 NOTE — MR AVS SNAPSHOT
After Visit Summary   11/12/2018    Israel Zabala    MRN: [id number]           Patient Information     Date Of Birth          1980        Visit Information        Provider Department      11/12/2018 8:00 AM Devante Painting MD [facility]        Today's Diagnoses     Well female exam with routine gynecological exam    -  1      Care Instructions      Preventive Health Recommendations  Female Ages 40 to 49    Yearly exam:     See your health care provider every year in order to  1. Review health changes.   2. Discuss preventive care.    3. Review your medicines if your doctor prescribed any.      Get a Pap test every three years (unless you have an abnormal result and your provider advises testing more often).      If you get Pap tests with HPV test, you only need to test every 5 years, unless you have an abnormal result. You do not need a Pap test if your uterus was removed (hysterectomy) and you have not had cancer.      You should be tested each year for STDs (sexually transmitted diseases), if you're at risk.     Ask your doctor if you should have a mammogram.      Have a colonoscopy (test for colon cancer) if someone in your family has had colon cancer or polyps before age 50.       Have a cholesterol test every 5 years.       Have a diabetes test (fasting glucose) after age 45. If you are at risk for diabetes, you should have this test every 3 years.    Shots: Get a flu shot each year. Get a tetanus shot every 10 years.     Nutrition:     Eat at least 5 servings of fruits and vegetables each day.    Eat whole-grain bread, whole-wheat pasta and brown rice instead of white grains and rice.    Get adequate Calcium and Vitamin D.      Lifestyle    Exercise at least 150 minutes a week (an average of 30 minutes a day, 5 days a week). This will help you control your weight and prevent disease.    Limit alcohol to one drink per day.    No smoking.     Wear sunscreen to  prevent skin cancer.    See your dentist every six months for an exam and cleaning.          Follow-ups after your visit        Additional Services     BARIATRIC ADULT REFERRAL       Your provider has referred you to: HE: [facility] Comprehensive Weight Management Program [facility] [phone] [email or web address]    Please be aware that coverage of these services is subject to the terms and limitations of your health insurance plan.  Call member services at your health plan with any benefit or coverage questions.      Please bring the following with you to your appointment:      (1) List of current medications   (2) This referral request   (3) Any documents/labs given to you for this referral                  Who to contact     If you have questions or need follow up information about today's clinic visit or your schedule please contact [facility] directly at [phone].  Normal or non-critical lab and imaging results will be communicated to you by MyChart, letter or phone within 4 business days after the clinic has received the results. If you do not hear from us within 7 days, please contact the clinic through [employer] or phone. If you have a critical or abnormal lab result, we will notify you by phone as soon as possible.  Submit refill requests through [employer] or call your pharmacy and they will forward the refill request to us. Please allow 3 business days for your refill to be completed.          Additional Information About Your Visit        [employer] [employer] Information     [employer] gives you secure access to your electronic health record. If you see a primary care provider, you can also send messages to your care team and make appointments. If you have questions, please call your primary care clinic.  If you do not have a primary care provider, please call [phone] and they will assist you.        Care  EveryWhere ID     This is your Care EveryWhere ID. This could be used by other organizations to access your [facility] medical records  [id number]        Your Vitals Were     Pulse Temperature Last Period Breastfeeding? BMI (Body Mass Index)       83 98.1  F (36.7  C) (Tympanic) 10/22/2018 No 50.26 kg/m2        Blood Pressure from Last 3 Encounters:   11/12/18 (!) 124/96   02/26/18 130/80   11/13/17 140/81    Weight from Last 3 Encounters:   11/12/18 277 lb (125.6 kg)   02/26/18 270 lb (122.5 kg)   11/13/17 268 lb (121.6 kg)              We Performed the Following     BARIATRIC ADULT REFERRAL     Glucose, whole blood     Lipid panel reflex to direct LDL Fasting     Pap imaged thin layer screen with HPV - recommended age 30 - 65 years (select HPV order below)     Wet prep        Primary Care Provider Office Phone # Fax #    Darcy Yang,  [phone] [address] [facility] [facility] [address]        Equal Access to Services     DOMONIQUE ALEJANDRO : Hadii aad ku hadasho Soomaali, waaxda luqadaha, qaybta kaalmada adeegyafacundo, blaire sterling . So [facility] [phone].    ATENCIÓN: Si habla español, tiene a alas disposición servicios gratuitos de asistencia lingüística. Llame al [phone].    We comply with applicable federal civil rights laws and [place] laws. We do not discriminate on the basis of race, color, national origin, age, disability, sex, sexual orientation, or gender identity.            Thank you!     Thank you for choosing [facility]  for your care. Our goal is always to provide you with excellent care. Hearing back from our patients is one way we can continue to improve our services. Please take a few minutes to complete the written survey that you may receive in the mail after your visit with us. Thank you!             Your Updated Medication List - Protect others around you: Learn how to safely use, store and throw away your medicines at  www.disposemymeds.org.          This list is accurate as of 11/12/18  8:40 AM.  Always use your most recent med list.                   Brand Name Dispense Instructions for use Diagnosis    ferrous sulfate 325 (65 Fe) MG tablet    IRON    60 tablet    Take 1 tablet (325 mg) by mouth 2 times daily        MULTIVITAMIN PO           VITAMIN B-12 PO      1 TABLET ORALLY DAILY(HOME MED)        vitamin D 2000 units tablet      Take 1 tablet by mouth daily

## 2018-11-12 NOTE — NURSING NOTE
"Initial BP (!) 124/96 (BP Location: Right arm, Patient Position: Chair, Cuff Size: Adult Regular)  Pulse 83  Temp 98.1  F (36.7  C) (Tympanic)  Wt 277 lb (125.6 kg)  LMP 10/22/2018  Breastfeeding? No  BMI 50.26 kg/m2 Estimated body mass index is 50.26 kg/(m^2) as calculated from the following:    Height as of 2/26/18: 5' 2.25\" (1.581 m).    Weight as of this encounter: 277 lb (125.6 kg). .    Nita Luke    "

## 2018-11-13 NOTE — PROGRESS NOTES
Inform patient that her fasting glucose level was elevated but not diagnostic of diabetes mellitus, but however, that it is in the pre-diabetic range. So no medical intervention is indicated, but with healthy diet and exercise which we had reviewed at the clinic visit in terms of pursuing those desires through the referral that I had ordered.   As for her cholesterol panel, there are significant elevations in all the levels when compared to the levels obtained from 2 years ago. I would strongly recommend seeking the advice of a primary care practitioner to ascertain if medical management is indicated at these higher levels as they deal with prescribing these kinds of medications more so than I do.     Devante Painting MD  [facility]

## 2018-11-14 LAB
COPATH REPORT: NORMAL
PAP: NORMAL

## 2018-11-18 LAB
FINAL DIAGNOSIS: NORMAL
HPV HR 12 DNA CVX QL NAA+PROBE: NEGATIVE
HPV16 DNA SPEC QL NAA+PROBE: NEGATIVE
HPV18 DNA SPEC QL NAA+PROBE: NEGATIVE
SPECIMEN DESCRIPTION: NORMAL
SPECIMEN SOURCE CVX/VAG CYTO: NORMAL

## 2018-11-20 ENCOUNTER — RECORDS - HEALTHEAST (OUTPATIENT)
Dept: ADMINISTRATIVE | Facility: OTHER | Age: 38
End: 2018-11-20

## 2018-12-27 ENCOUNTER — OFFICE VISIT - HEALTHEAST (OUTPATIENT)
Dept: SURGERY | Facility: CLINIC | Age: 38
End: 2018-12-27

## 2018-12-27 ENCOUNTER — AMBULATORY - HEALTHEAST (OUTPATIENT)
Dept: LAB | Facility: CLINIC | Age: 38
End: 2018-12-27

## 2018-12-27 DIAGNOSIS — E66.01 OBESITY, CLASS III, BMI 40-49.9 (MORBID OBESITY) (H): ICD-10-CM

## 2018-12-27 DIAGNOSIS — R63.5 WEIGHT GAIN FOLLOWING GASTRIC BYPASS SURGERY: ICD-10-CM

## 2018-12-27 DIAGNOSIS — K91.2 POSTOPERATIVE MALABSORPTION: ICD-10-CM

## 2018-12-27 DIAGNOSIS — Z98.84 HX OF GASTRIC BYPASS: ICD-10-CM

## 2018-12-27 DIAGNOSIS — Z98.84 WEIGHT GAIN FOLLOWING GASTRIC BYPASS SURGERY: ICD-10-CM

## 2018-12-27 LAB
ALBUMIN SERPL-MCNC: 3.5 G/DL (ref 3.5–5)
ALP SERPL-CCNC: 85 U/L (ref 45–120)
ALT SERPL W P-5'-P-CCNC: 15 U/L (ref 0–45)
ALT SERPL-CCNC: 15 U/L (ref 0–45)
ANION GAP SERPL CALCULATED.3IONS-SCNC: 12 MMOL/L (ref 5–18)
AST SERPL W P-5'-P-CCNC: 19 U/L (ref 0–40)
AST SERPL-CCNC: 19 U/L (ref 0–40)
BILIRUB SERPL-MCNC: 0.2 MG/DL (ref 0–1)
BUN SERPL-MCNC: 9 MG/DL (ref 8–22)
CALCIUM SERPL-MCNC: 9.6 MG/DL (ref 8.5–10.5)
CHLORIDE BLD-SCNC: 106 MMOL/L (ref 98–107)
CO2 SERPL-SCNC: 23 MMOL/L (ref 22–31)
CREAT SERPL-MCNC: 0.66 MG/DL (ref 0.6–1.1)
CREAT SERPL-MCNC: 0.66 MG/DL (ref 0.6–1.1)
ERYTHROCYTE [DISTWIDTH] IN BLOOD BY AUTOMATED COUNT: 11.2 % (ref 11–14.5)
FERRITIN SERPL-MCNC: 42 NG/ML (ref 10–130)
FOLATE SERPL-MCNC: 14 NG/ML
GFR SERPL CREATININE-BSD FRML MDRD: >60 ML/MIN/1.73M2
GFR SERPL CREATININE-BSD FRML MDRD: >60 ML/MIN/1.73M2
GLUCOSE BLD-MCNC: 116 MG/DL (ref 70–125)
GLUCOSE SERPL-MCNC: 116 MG/DL (ref 70–125)
HBA1C MFR BLD: 6.4 % (ref 3.5–6)
HBA1C MFR BLD: 6.4 % (ref 3.5–6)
HCT VFR BLD AUTO: 40.7 % (ref 35–47)
HGB BLD-MCNC: 14.4 G/DL (ref 12–16)
MCH RBC QN AUTO: 33.2 PG (ref 27–34)
MCHC RBC AUTO-ENTMCNC: 35.3 G/DL (ref 32–36)
MCV RBC AUTO: 94 FL (ref 80–100)
PLATELET # BLD AUTO: 253 THOU/UL (ref 140–440)
PMV BLD AUTO: 8.8 FL (ref 7–10)
POTASSIUM BLD-SCNC: 4.3 MMOL/L (ref 3.5–5)
POTASSIUM SERPL-SCNC: 4.3 MMOL/L (ref 3.5–5)
PROT SERPL-MCNC: 6.7 G/DL (ref 6–8)
PTH-INTACT SERPL-MCNC: 94 PG/ML (ref 10–86)
RBC # BLD AUTO: 4.33 MILL/UL (ref 3.8–5.4)
SODIUM SERPL-SCNC: 141 MMOL/L (ref 136–145)
TSH SERPL DL<=0.005 MIU/L-ACNC: 2.03 UIU/ML (ref 0.3–5)
TSH SERPL-ACNC: 2.03 UIU/ML (ref 0.3–5)
VIT B12 SERPL-MCNC: 917 PG/ML (ref 213–816)
WBC: 6.8 THOU/UL (ref 4–11)

## 2018-12-27 ASSESSMENT — MIFFLIN-ST. JEOR: SCORE: 1877.99

## 2018-12-28 LAB — 25(OH)D3 SERPL-MCNC: 25.7 NG/ML (ref 30–80)

## 2018-12-29 LAB — VIT B1 PYROPHOSHATE BLD-SCNC: 106 NMOL/L (ref 70–180)

## 2018-12-30 LAB
ANNOTATION COMMENT IMP: NORMAL
COPPER SERPL-MCNC: 166 UG/DL (ref 80–155)
VIT A SERPL-MCNC: 0.52 MG/L (ref 0.3–1.2)
VITAMIN A (RETINYL PALMITATE): 0.03 MG/L (ref 0–0.1)
ZINC SERPL-MCNC: 69 UG/DL (ref 60–120)

## 2019-01-02 ENCOUNTER — HOSPITAL ENCOUNTER (OUTPATIENT)
Dept: RADIOLOGY | Facility: HOSPITAL | Age: 39
Discharge: HOME | End: 2019-01-02
Attending: EMERGENCY MEDICINE

## 2019-01-02 DIAGNOSIS — Z98.84 WEIGHT GAIN FOLLOWING GASTRIC BYPASS SURGERY: ICD-10-CM

## 2019-01-02 DIAGNOSIS — K91.2 POSTOPERATIVE MALABSORPTION: ICD-10-CM

## 2019-01-02 DIAGNOSIS — R63.5 WEIGHT GAIN FOLLOWING GASTRIC BYPASS SURGERY: ICD-10-CM

## 2019-01-03 ENCOUNTER — COMMUNICATION - HEALTHEAST (OUTPATIENT)
Dept: SURGERY | Facility: CLINIC | Age: 39
End: 2019-01-03

## 2019-01-04 ENCOUNTER — AMBULATORY - HEALTHEAST (OUTPATIENT)
Dept: SURGERY | Facility: CLINIC | Age: 39
End: 2019-01-04

## 2019-01-04 DIAGNOSIS — R73.03 BORDERLINE DIABETES: ICD-10-CM

## 2019-01-04 DIAGNOSIS — E66.01 OBESITY, CLASS III, BMI 40-49.9 (MORBID OBESITY) (H): ICD-10-CM

## 2019-02-14 ENCOUNTER — OFFICE VISIT - HEALTHEAST (OUTPATIENT)
Dept: SURGERY | Facility: CLINIC | Age: 39
End: 2019-02-14

## 2019-02-14 ENCOUNTER — TRANSFERRED RECORDS (OUTPATIENT)
Dept: HEALTH INFORMATION MANAGEMENT | Facility: CLINIC | Age: 39
End: 2019-02-14

## 2019-02-14 DIAGNOSIS — E66.01 OBESITY, CLASS III, BMI 40-49.9 (MORBID OBESITY) (H): ICD-10-CM

## 2019-02-14 DIAGNOSIS — Z71.3 NUTRITIONAL COUNSELING: ICD-10-CM

## 2019-02-14 DIAGNOSIS — Z98.84 BARIATRIC SURGERY STATUS: ICD-10-CM

## 2019-02-14 ASSESSMENT — MIFFLIN-ST. JEOR: SCORE: 1872.1

## 2019-02-27 ENCOUNTER — OFFICE VISIT - HEALTHEAST (OUTPATIENT)
Dept: SURGERY | Facility: CLINIC | Age: 39
End: 2019-02-27

## 2019-02-27 ENCOUNTER — TRANSFERRED RECORDS (OUTPATIENT)
Dept: HEALTH INFORMATION MANAGEMENT | Facility: CLINIC | Age: 39
End: 2019-02-27

## 2019-02-27 DIAGNOSIS — R73.03 BORDERLINE DIABETES: ICD-10-CM

## 2019-02-27 DIAGNOSIS — K91.2 POSTOPERATIVE MALABSORPTION: ICD-10-CM

## 2019-02-27 DIAGNOSIS — E21.1 SECONDARY HYPERPARATHYROIDISM, NON-RENAL (H): ICD-10-CM

## 2019-02-27 DIAGNOSIS — E66.01 OBESITY, CLASS III, BMI 40-49.9 (MORBID OBESITY) (H): ICD-10-CM

## 2019-02-27 ASSESSMENT — MIFFLIN-ST. JEOR: SCORE: 1854.41

## 2019-04-15 ENCOUNTER — COMMUNICATION - HEALTHEAST (OUTPATIENT)
Dept: SURGERY | Facility: CLINIC | Age: 39
End: 2019-04-15

## 2019-04-15 ENCOUNTER — OFFICE VISIT (OUTPATIENT)
Dept: OBGYN | Facility: CLINIC | Age: 39
End: 2019-04-15
Payer: COMMERCIAL

## 2019-04-15 VITALS
DIASTOLIC BLOOD PRESSURE: 91 MMHG | WEIGHT: 273 LBS | BODY MASS INDEX: 49.53 KG/M2 | TEMPERATURE: 98.1 F | SYSTOLIC BLOOD PRESSURE: 140 MMHG

## 2019-04-15 DIAGNOSIS — N93.9 ABNORMAL UTERINE BLEEDING: Primary | ICD-10-CM

## 2019-04-15 LAB
T4 FREE SERPL-MCNC: 0.9 NG/DL (ref 0.76–1.46)
TSH SERPL DL<=0.005 MIU/L-ACNC: 2.5 MU/L (ref 0.4–4)

## 2019-04-15 PROCEDURE — 99214 OFFICE O/P EST MOD 30 MIN: CPT | Performed by: OBSTETRICS & GYNECOLOGY

## 2019-04-15 PROCEDURE — 84439 ASSAY OF FREE THYROXINE: CPT | Performed by: OBSTETRICS & GYNECOLOGY

## 2019-04-15 PROCEDURE — 84443 ASSAY THYROID STIM HORMONE: CPT | Performed by: OBSTETRICS & GYNECOLOGY

## 2019-04-15 PROCEDURE — 36415 COLL VENOUS BLD VENIPUNCTURE: CPT | Performed by: OBSTETRICS & GYNECOLOGY

## 2019-04-15 RX ORDER — METFORMIN HCL 500 MG
500 TABLET, EXTENDED RELEASE 24 HR ORAL 2 TIMES DAILY WITH MEALS
COMMUNITY
End: 2019-12-16

## 2019-04-15 NOTE — NURSING NOTE
"Initial BP (!) 140/91 (BP Location: Left arm, Patient Position: Chair, Cuff Size: Adult Large)   Temp 98.1  F (36.7  C) (Tympanic)   Wt 123.8 kg (273 lb)   LMP 02/13/2019 (Exact Date)   Breastfeeding? No   BMI 49.53 kg/m   Estimated body mass index is 49.53 kg/m  as calculated from the following:    Height as of 2/26/18: 1.581 m (5' 2.25\").    Weight as of this encounter: 123.8 kg (273 lb). .    Nita Luke    "

## 2019-04-16 ENCOUNTER — HOSPITAL ENCOUNTER (OUTPATIENT)
Dept: ULTRASOUND IMAGING | Facility: CLINIC | Age: 39
Discharge: HOME | End: 2019-04-16
Attending: OBSTETRICS & GYNECOLOGY | Admitting: OBSTETRICS & GYNECOLOGY
Payer: COMMERCIAL

## 2019-04-16 DIAGNOSIS — N93.9 ABNORMAL UTERINE BLEEDING: ICD-10-CM

## 2019-04-16 PROCEDURE — 76830 TRANSVAGINAL US NON-OB: CPT

## 2019-04-24 ENCOUNTER — TRANSFERRED RECORDS (OUTPATIENT)
Dept: HEALTH INFORMATION MANAGEMENT | Facility: CLINIC | Age: 39
End: 2019-04-24

## 2019-04-24 ENCOUNTER — OFFICE VISIT - HEALTHEAST (OUTPATIENT)
Dept: SURGERY | Facility: CLINIC | Age: 39
End: 2019-04-24

## 2019-04-24 DIAGNOSIS — E66.01 OBESITY, CLASS III, BMI 40-49.9 (MORBID OBESITY) (H): ICD-10-CM

## 2019-04-24 DIAGNOSIS — R73.03 BORDERLINE DIABETES: ICD-10-CM

## 2019-04-24 ASSESSMENT — MIFFLIN-ST. JEOR: SCORE: 1850.33

## 2019-05-02 ENCOUNTER — TRANSFERRED RECORDS (OUTPATIENT)
Dept: HEALTH INFORMATION MANAGEMENT | Facility: CLINIC | Age: 39
End: 2019-05-02

## 2019-05-02 ENCOUNTER — OFFICE VISIT - HEALTHEAST (OUTPATIENT)
Dept: SURGERY | Facility: CLINIC | Age: 39
End: 2019-05-02

## 2019-05-02 DIAGNOSIS — Z71.3 NUTRITIONAL COUNSELING: ICD-10-CM

## 2019-05-02 DIAGNOSIS — Z98.84 BARIATRIC SURGERY STATUS: ICD-10-CM

## 2019-05-02 DIAGNOSIS — E66.01 OBESITY, CLASS III, BMI 40-49.9 (MORBID OBESITY) (H): ICD-10-CM

## 2019-05-02 ASSESSMENT — MIFFLIN-ST. JEOR: SCORE: 1847.15

## 2019-06-06 ENCOUNTER — COMMUNICATION - HEALTHEAST (OUTPATIENT)
Dept: SURGERY | Facility: CLINIC | Age: 39
End: 2019-06-06

## 2019-07-12 ENCOUNTER — APPOINTMENT (OUTPATIENT)
Dept: GENERAL RADIOLOGY | Facility: CLINIC | Age: 39
End: 2019-07-12
Attending: PHYSICIAN ASSISTANT
Payer: COMMERCIAL

## 2019-07-12 ENCOUNTER — HOSPITAL ENCOUNTER (EMERGENCY)
Facility: CLINIC | Age: 39
Discharge: HOME | End: 2019-07-12
Attending: PHYSICIAN ASSISTANT | Admitting: PHYSICIAN ASSISTANT
Payer: COMMERCIAL

## 2019-07-12 VITALS
OXYGEN SATURATION: 99 % | TEMPERATURE: 98.3 F | BODY MASS INDEX: 47.84 KG/M2 | SYSTOLIC BLOOD PRESSURE: 172 MMHG | DIASTOLIC BLOOD PRESSURE: 100 MMHG | WEIGHT: 270 LBS | RESPIRATION RATE: 18 BRPM | HEART RATE: 101 BPM | HEIGHT: 63 IN

## 2019-07-12 DIAGNOSIS — R81: ICD-10-CM

## 2019-07-12 DIAGNOSIS — J06.9 VIRAL URI WITH COUGH: ICD-10-CM

## 2019-07-12 LAB
ALBUMIN UR-MCNC: NEGATIVE MG/DL
APPEARANCE UR: ABNORMAL
BASOPHILS # BLD AUTO: 0.1 10E9/L (ref 0–0.2)
BASOPHILS NFR BLD AUTO: 0.7 %
BILIRUB UR QL STRIP: NEGATIVE
CAOX CRY #/AREA URNS HPF: ABNORMAL /HPF
COLOR UR AUTO: YELLOW
DIFFERENTIAL METHOD BLD: ABNORMAL
EOSINOPHIL # BLD AUTO: 0.2 10E9/L (ref 0–0.7)
EOSINOPHIL NFR BLD AUTO: 2.1 %
ERYTHROCYTE [DISTWIDTH] IN BLOOD BY AUTOMATED COUNT: 13.1 % (ref 10–15)
GLUCOSE BLDC GLUCOMTR-MCNC: 89 MG/DL (ref 70–99)
GLUCOSE UR STRIP-MCNC: >499 MG/DL
HCT VFR BLD AUTO: 42.4 % (ref 35–47)
HETEROPH AB SER QL: NEGATIVE
HGB BLD-MCNC: 13.7 G/DL (ref 11.7–15.7)
HGB UR QL STRIP: NEGATIVE
IMM GRANULOCYTES # BLD: 0.1 10E9/L (ref 0–0.4)
IMM GRANULOCYTES NFR BLD: 0.7 %
INTERNAL QC OK POCT: YES
KETONES UR STRIP-MCNC: NEGATIVE MG/DL
LEUKOCYTE ESTERASE UR QL STRIP: NEGATIVE
LYMPHOCYTES # BLD AUTO: 3.3 10E9/L (ref 0.8–5.3)
LYMPHOCYTES NFR BLD AUTO: 39.2 %
MCH RBC QN AUTO: 32.5 PG (ref 26.5–33)
MCHC RBC AUTO-ENTMCNC: 32.3 G/DL (ref 31.5–36.5)
MCV RBC AUTO: 101 FL (ref 78–100)
MONOCYTES # BLD AUTO: 0.8 10E9/L (ref 0–1.3)
MONOCYTES NFR BLD AUTO: 9.2 %
MUCOUS THREADS #/AREA URNS LPF: PRESENT /LPF
NEUTROPHILS # BLD AUTO: 4.1 10E9/L (ref 1.6–8.3)
NEUTROPHILS NFR BLD AUTO: 48.1 %
NITRATE UR QL: NEGATIVE
NRBC # BLD AUTO: 0 10*3/UL
NRBC BLD AUTO-RTO: 0 /100
PH UR STRIP: 5 PH (ref 5–7)
PLATELET # BLD AUTO: 285 10E9/L (ref 150–450)
RBC # BLD AUTO: 4.22 10E12/L (ref 3.8–5.2)
RBC #/AREA URNS AUTO: 4 /HPF (ref 0–2)
S PYO AG THROAT QL IA.RAPID: NEGATIVE
SOURCE: ABNORMAL
SP GR UR STRIP: 1.03 (ref 1–1.03)
SQUAMOUS #/AREA URNS AUTO: <1 /HPF (ref 0–1)
UROBILINOGEN UR STRIP-MCNC: 0 MG/DL (ref 0–2)
WBC # BLD AUTO: 8.5 10E9/L (ref 4–11)
WBC #/AREA URNS AUTO: 1 /HPF (ref 0–5)

## 2019-07-12 PROCEDURE — 99214 OFFICE O/P EST MOD 30 MIN: CPT | Mod: Z6 | Performed by: PHYSICIAN ASSISTANT

## 2019-07-12 PROCEDURE — 71046 X-RAY EXAM CHEST 2 VIEWS: CPT

## 2019-07-12 PROCEDURE — 86308 HETEROPHILE ANTIBODY SCREEN: CPT | Performed by: PHYSICIAN ASSISTANT

## 2019-07-12 PROCEDURE — 87081 CULTURE SCREEN ONLY: CPT | Performed by: PHYSICIAN ASSISTANT

## 2019-07-12 PROCEDURE — G0463 HOSPITAL OUTPT CLINIC VISIT: HCPCS | Mod: 25 | Performed by: PHYSICIAN ASSISTANT

## 2019-07-12 PROCEDURE — 87880 STREP A ASSAY W/OPTIC: CPT | Performed by: PHYSICIAN ASSISTANT

## 2019-07-12 PROCEDURE — 81001 URINALYSIS AUTO W/SCOPE: CPT | Performed by: PHYSICIAN ASSISTANT

## 2019-07-12 PROCEDURE — 00000146 ZZHCL STATISTIC GLUCOSE BY METER IP

## 2019-07-12 PROCEDURE — 85025 COMPLETE CBC W/AUTO DIFF WBC: CPT | Performed by: PHYSICIAN ASSISTANT

## 2019-07-12 RX ORDER — BENZONATATE 100 MG/1
100-200 CAPSULE ORAL 3 TIMES DAILY PRN
Qty: 42 CAPSULE | Refills: 0 | Status: SHIPPED | OUTPATIENT
Start: 2019-07-12 | End: 2019-12-16

## 2019-07-12 RX ORDER — ALBUTEROL SULFATE 90 UG/1
2 AEROSOL, METERED RESPIRATORY (INHALATION) EVERY 6 HOURS PRN
Qty: 18 G | Refills: 0 | Status: SHIPPED | OUTPATIENT
Start: 2019-07-12 | End: 2019-12-16

## 2019-07-12 ASSESSMENT — MIFFLIN-ST. JEOR: SCORE: 1863.84

## 2019-07-12 NOTE — ED AVS SNAPSHOT
[facility]  [address] [address]  Phone:  [phone]  Fax:  [phone]                                    Israel Zabala   MRN: [id number]    Department:  [facility]   Date of Visit:  7/12/2019           After Visit Summary Signature Page    I have received my discharge instructions, and my questions have been answered. I have discussed any challenges I see with this plan with the nurse or doctor.    ..........................................................................................................................................  Patient/Patient Representative Signature      ..........................................................................................................................................  Patient Representative Print Name and Relationship to Patient    ..................................................               ................................................  Date                                   Time    ..........................................................................................................................................  Reviewed by Signature/Title    ...................................................              ..............................................  Date                                               Time          22EPIC Rev 08/18

## 2019-07-12 NOTE — ED PROVIDER NOTES
"  History     Chief Complaint   Patient presents with     Cough     Pharyngitis     Back Pain     HPI  Israel Zabala is a 38 year old female with significant past medical history who presents to the urgent care with concern over cough and fatigue which been present for approximately last 2 weeks.  She additionally complains of fatigue and shortness of breath with activity.  She did have a sore throat, nasal congestion at onset which has since resolved.  She denies any current fever, wheezing,nausea, vomiting, diarrhea or abdominal pain.  She is status post lobectomy secondary to carcinoid tumor. She denies any history of asthma, COPD. She is a former smoker.    She has a second chief complaint of low back pain which is worse in the left side.  She reports that as a teen she was diagnosed with a slipped disc after an injury.  She states that she will intermittently \"throw her back out\", this most recently happened approximately 3 months ago.  She has treated with home stretches with some improvement however states she has had some mild lingering pain which radiates to her left thigh and will intermittently have left leg paresthesias.  She denies any dysuria, hematuria, change in bowel or bladder control or saddle numbness or paresthesias.       Allergies:  No Known Allergies    Problem List:    Patient Active Problem List    Diagnosis Date Noted     White coat syndrome without diagnosis of hypertension 11/13/2017     Priority: Medium     Nipple discharge in female 11/13/2017     Priority: Medium     Excessive bleeding in premenopausal period 11/14/2016     Priority: Medium     Morbid obesity, unspecified obesity type (H) 02/24/2016     Priority: Medium     Personal history of gastric bypass 02/24/2016     Priority: Medium     Carcinoid tumor 11/07/2013     Priority: Medium     T2,N2, M0 lesion consistent with Stage IIIa disease.   Rescetion RML RLL Bilobectomy  with mediastinal lymph node dissection 11/22/2013 " Typical carcinoid with metastatis of Stations 7 and 4R Lymph nodes.   Reassured good chance surgery is a cure.   Repeat CT scan 6 months -6 /2014 no evidence of disease. [facility]  Internal medicine TERE [place] [phone]   # [phone]    Follow up @ [place]           CARDIOVASCULAR SCREENING; LDL GOAL LESS THAN 160 10/31/2010     Priority: Medium        Past Medical History:    Past Medical History:   Diagnosis Date     Abnormal glandular Papanicolaou smear of cervix 2002     Chickenpox      Chronic maxillary sinusitis 3/31/2006     CTS (carpal tunnel syndrome) 3/6/2014     Situational anxiety 12/13/2012       Past Surgical History:    Past Surgical History:   Procedure Laterality Date     BRONCHOSCOPY FLEXIBLE  11/1/2013    related to carcinoid;  Flexible Bronchoscopy, Endobronchial Flexible Ultrasound;  Surgeon: Chris Sal MD;  Location: [facility] OR     C REMOVAL OF LUNG,BILOBECTOMY Right 11/22/2013    Carcinoid: Right Middle and Lower Lobe     ENDOBRONCHIAL ULTRASOUND FLEXIBLE  11/1/2013    Procedure: ENDOBRONCHIAL ULTRASOUND FLEXIBLE;;  Surgeon: Chris Sal MD;  Location: [facility] OR     GASTRIC BYPASS  2003     HERNIORRHAPHY INCISIONAL (LOCATION)  8/19/2011    Procedure:HERNIORRHAPHY INCISIONAL (LOCATION); Incisional Hernia Repair upper abdomen midline       TONSILLECTOMY  age 7       Family History:    Family History   Problem Relation Age of Onset     Breast Cancer Maternal Grandmother 65     Cancer Maternal Grandmother         skin     Diabetes Maternal Grandfather      Heart Disease Paternal Grandmother         MI     Cerebrovascular Disease Paternal Grandmother      Heart Disease Paternal Grandfather         MI     Gastrointestinal Disease Brother         Colitis     Prostate Cancer Father      Cancer Mother         skin     Congenital Anomalies Daughter         imperforate anus       Social History:  Marital Status:   [2]  Social History     Tobacco Use      "Smoking status: Former Smoker     Packs/day: 0.10     Years: 8.00     Pack years: 0.80     Types: Cigarettes     Last attempt to quit: 9/26/2008     Years since quitting: 10.7     Smokeless tobacco: Never Used   Substance Use Topics     Alcohol use: Yes     Comment: occasional     Drug use: No        Medications:      Cholecalciferol (VITAMIN D) 2000 UNITS tablet   ferrous sulfate (IRON) 325 (65 FE) MG tablet   metFORMIN (GLUCOPHAGE-XR) 500 MG 24 hr tablet   Multiple Vitamins-Minerals (MULTIVITAMIN PO)   Phentermine HCl (LOMAIRA) 8 MG TABS   VITAMIN B-12 PO     Review of Systems  CONSTITUTIONAL:NEGATIVE for fever, chills, change in weight  INTEGUMENTARY/SKIN: NEGATIVE for worrisome rashes, moles or lesions  EYES: NEGATIVE for vision changes or irritation  ENT/MOUTH: POSITIVE for resolved sore throat, nasal congestion NEGATIVE for ear pain   RESP:POSITIVE for cough, shortness of breath  and NEGATIVE for wheezing   GI: NEGATIVE for nausea, vomiting,diarrhea, abdominal pain  : NEGATIVE for dysuria, hematuria, increased frequency or urgency   MUSCULOSKELETAL: POSITIVE  for back pain and NEGATIVE for other new onset arthralgias or myalgias  NEURO: POSITIVE for intermittent paresthesias left leg  Physical Exam   BP: (!) 172/100  Pulse: 101  Temp: 98.3  F (36.8  C)  Resp: 18  Height: 160 cm (5' 3\")  Weight: 122.5 kg (270 lb)  SpO2: 99 %  Physical Exam  GENERAL APPEARANCE: healthy, alert and no distress  EYES: EOMI,  PERRL, conjunctiva clear  HENT: ear canals and TM's normal.  Nose and mouth without ulcers, erythema or lesions  NECK: supple, nontender, bilateral posterior cervical lymphadenopathy   RESP: lungs clear to auscultation - no rales, rhonchi or wheezes  CV: regular rates and rhythm, normal S1 S2, no murmur noted  ABDOMEN:  soft, nontender, no HSM or masses and bowel sounds normal, no CVA tenderness   MS: decreased ROM with flexion due to pain, tenderness to palpation of left paraspinal muscles,   NEURO: Normal " strength and tone, sensory exam grossly normal,  Patellar reflexes +2/4 bilaterally   SKIN: no suspicious lesions or rashes  ED Course        Procedures               Critical Care time:  none               Results for orders placed or performed during the hospital encounter of 07/12/19   Chest XR,  PA & LAT    Narrative    CHEST TWO VIEWS 7/12/2019 3:10 PM     HISTORY: Cough, fatigue, rule out pneumonia    COMPARISON: 6/1/2017    FINDINGS: Heart size and pulmonary vascularity are within normal  limits. The lungs are clear. No pneumothorax or pleural effusion.       Impression    IMPRESSION: No radiographic evidence of acute chest abnormality.     SANTOS RICO MD   UA with Microscopic   Result Value Ref Range    Color Urine Yellow     Appearance Urine Slightly Cloudy     Glucose Urine >499 (A) NEG^Negative mg/dL    Bilirubin Urine Negative NEG^Negative    Ketones Urine Negative NEG^Negative mg/dL    Specific Gravity Urine 1.028 1.003 - 1.035    Blood Urine Negative NEG^Negative    pH Urine 5.0 5.0 - 7.0 pH    Protein Albumin Urine Negative NEG^Negative mg/dL    Urobilinogen mg/dL 0.0 0.0 - 2.0 mg/dL    Nitrite Urine Negative NEG^Negative    Leukocyte Esterase Urine Negative NEG^Negative    Source Midstream Urine     WBC Urine 1 0 - 5 /HPF    RBC Urine 4 (H) 0 - 2 /HPF    Squamous Epithelial /HPF Urine <1 0 - 1 /HPF    Mucous Urine Present (A) NEG^Negative /LPF    Calcium Oxalate Many (A) NEG^Negative /HPF   CBC with platelets, differential   Result Value Ref Range    WBC 8.5 4.0 - 11.0 10e9/L    RBC Count 4.22 3.8 - 5.2 10e12/L    Hemoglobin 13.7 11.7 - 15.7 g/dL    Hematocrit 42.4 35.0 - 47.0 %     (H) 78 - 100 fl    MCH 32.5 26.5 - 33.0 pg    MCHC 32.3 31.5 - 36.5 g/dL    RDW 13.1 10.0 - 15.0 %    Platelet Count 285 150 - 450 10e9/L    Diff Method Automated Method     % Neutrophils 48.1 %    % Lymphocytes 39.2 %    % Monocytes 9.2 %    % Eosinophils 2.1 %    % Basophils 0.7 %    % Immature Granulocytes  0.7 %    Nucleated RBCs 0 0 /100    Absolute Neutrophil 4.1 1.6 - 8.3 10e9/L    Absolute Lymphocytes 3.3 0.8 - 5.3 10e9/L    Absolute Monocytes 0.8 0.0 - 1.3 10e9/L    Absolute Eosinophils 0.2 0.0 - 0.7 10e9/L    Absolute Basophils 0.1 0.0 - 0.2 10e9/L    Abs Immature Granulocytes 0.1 0 - 0.4 10e9/L    Absolute Nucleated RBC 0.0    Mono   Result Value Ref Range    Mononucleosis Screen Negative NEG^Negative   Glucose by meter   Result Value Ref Range    Glucose 89 70 - 99 mg/dL   Rapid strep group A screen POCT   Result Value Ref Range    Rapid Strep A Screen negative neg    Internal QC OK Yes    Beta strep group A r/o culture   Result Value Ref Range    Specimen Description Throat     Special Requests Specimen collected in eSwab transport (white cap)     Culture Micro No Beta Streptococcus isolated        Medications - No data to display    Assessments & Plan (with Medical Decision Making)     I have reviewed the nursing notes.    I have reviewed the findings, diagnosis, plan and need for follow up with the patient.          Medication List      Started    albuterol 108 (90 Base) MCG/ACT inhaler  Commonly known as:  PROAIR HFA/PROVENTIL HFA/VENTOLIN HFA  2 puffs, Inhalation, EVERY 6 HOURS PRN     benzonatate 100 MG capsule  Commonly known as:  TESSALON  100-200 mg, Oral, 3 TIMES DAILY PRN          Final diagnoses:   Viral URI with cough   Glycosuria with normal serum glucose     38-year-old female presents to the urgent care with concern over 2-week history of cough accompanied by fatigue sore throat shortness of breath activity.  She had elevated blood pressure mildly elevated heart rate upon arrival, remainder vital signs within normal limits.  Physical exam findings as described above were significant for bilateral posterior cervical lymphadenopathy.  Her lungs are clear to auscultation without wheezing rales or rhonchi.  Chest x-ray was obtained and was negative for acute infiltrate, pneumothorax, pleural  effusion or change in cardiopulmonary vasculature.  Due to the duration of her symptoms and complaints she did have laboratory testing including normal CBC, negative mono test, negative rapid strep test with culture pending at time of her discharge.  Urinalysis was obtained and did not demonstrate any evidence of infection to suggest acute cystitis, pyelonephritis, however was noted to have significantly elevated glucose, serum level was then obtained which is within normal limits.  Symptoms consistent with viral URI.  Differential would also include bronchitis.  I do not suspect pertussis, PE at this time and will defer further evaluation.  Back pain is consistent with chronic mechanical back pain.  Referral to physical therapy given.  Follow-up with primary care provider if no improvement within the next 5 to 7 days.  Worrisome reasons to return to the ER/UC sooner discussed.    Disclaimer: This note consists of symbols derived from keyboarding, dictation, and/or voice recognition software. As a result, there may be errors in the script that have gone undetected.  Please consider this when interpreting information found in the chart.    7/12/2019   [facility] EMERGENCY DEPARTMENT     Shalonda Batista PA-C  07/19/19 0020

## 2019-07-14 LAB
BACTERIA SPEC CULT: NORMAL
Lab: NORMAL
SPECIMEN SOURCE: NORMAL

## 2019-10-03 ENCOUNTER — HEALTH MAINTENANCE LETTER (OUTPATIENT)
Age: 39
End: 2019-10-03

## 2019-11-13 ENCOUNTER — VIRTUAL VISIT (OUTPATIENT)
Dept: FAMILY MEDICINE | Facility: OTHER | Age: 39
End: 2019-11-13

## 2019-11-13 ENCOUNTER — ANCILLARY PROCEDURE (OUTPATIENT)
Dept: MAMMOGRAPHY | Facility: CLINIC | Age: 39
End: 2019-11-13
Attending: OBSTETRICS & GYNECOLOGY
Payer: COMMERCIAL

## 2019-11-13 DIAGNOSIS — Z12.31 VISIT FOR SCREENING MAMMOGRAM: ICD-10-CM

## 2019-11-13 PROCEDURE — 77067 SCR MAMMO BI INCL CAD: CPT | Mod: TC

## 2019-11-13 PROCEDURE — 77063 BREAST TOMOSYNTHESIS BI: CPT | Mod: TC

## 2019-11-13 NOTE — PROGRESS NOTES
"Date: 2019 05:28:04  Clinician: Indu Pitt  Clinician NPI: [id number]  Patient: Israel Zabala  Patient : 1980  Patient Address: [address], [place] [address]  Patient Phone: [phone]  Visit Protocol: Yeast infection  Patient Summary:  Israel is a 39 year old ( : 1980 ) female who initiated a Visit for a presumed vaginal yeast infection. When asked the question \"Please sign me up to receive news, health information and promotions from [employer]..\", Israel responded \"No\".    Israel began noticing vaginal burning, vaginal irritation, vaginal discharge, and vaginal pruritus 1-3 days ago.   Symptom details   Vaginal discharge: She has a more than normal amount of white, chunky (like cottage cheese) discharge. The discharge does not have a fishy smell.    She denies having abdominal pain, blisters, and open sores. She also denies feeling feverish.   She has not tried to treat her current symptoms with any medication.   Precipitating events  Israel denies having a sexually transmitted disease.   Pertinent medical history  Israel has a history of vaginal yeast infections. She has had one (1) occurrence in the past year and the current symptoms are similar to previous yeast infections. She has used fluconazole (Diflucan) to treat previous yeast infections. 2 doses of fluconazole (Diflucan) has typically been needed for symptoms to resolve in the past.  She prefers a pill. She denies taking antibiotics in the past 2 weeks.   She does NOT smoke or use smokeless tobacco.   She denies pregnancy and denies breastfeeding. She has menstruated in the past month.      MEDICATIONS: No current medications, ALLERGIES: NKDA  Clinician Response:  Dear Israel,  Based on the information you have provided, you likely have a vaginal yeast infection which is a common infection of the vagina caused by a fungus. Yeast are a type of fungus.  The most common symptom of a yeast infection is itching in and around " the vagina. Other signs and symptoms include burning, redness, or a thick, white vaginal discharge that looks like cottage cheese and does not have a bad smell.  Medication information  I am prescribing:     Fluconazole (Diflucan) 150 mg oral tablet. Take 1 tablet by mouth in a single dose. Repeat dose in 3 days if symptoms are still present. There are no refills with this prescription.   Self care  Steps you can take to prevent symptoms of future vaginal yeast infection:     Avoid irritants such as scented bath products, tampons, pads, or vaginal sprays and powders    Avoid douching    Wear cotton underwear and if you are comfortable doing so, do not wear underwear to bed    Avoid hot tubs and whirlpool spas     When to seek care  Most women notice improvement in their symptoms within 1-2 days after starting treatment with complete clearing in 5-7 days.  Please make an appointment to be seen in a clinic or urgent care if any of the following occur:     Your symptoms have not improved in 3 days or not resolved in 10 days    You develop new symptoms or your symptoms become worse    If you think you may be at risk for an STD      Diagnosis: Candida vulvovaginitis  Diagnosis ICD: B37.3  Prescription: fluconazole (Diflucan) 150 mg oral tablet 2 tablet, 4 days supply. Take 1 tablet by mouth in a single dose, repeat dose in 3 days if symptoms are still present. Refills: 0, Refill as needed: no, Allow substitutions: yes  Pharmacy: [facility] - [phone] - [address] [place] [id number]

## 2019-12-16 ENCOUNTER — OFFICE VISIT (OUTPATIENT)
Dept: OBGYN | Facility: CLINIC | Age: 39
End: 2019-12-16
Payer: COMMERCIAL

## 2019-12-16 VITALS
SYSTOLIC BLOOD PRESSURE: 132 MMHG | RESPIRATION RATE: 18 BRPM | DIASTOLIC BLOOD PRESSURE: 88 MMHG | BODY MASS INDEX: 48.2 KG/M2 | TEMPERATURE: 98.5 F | WEIGHT: 272 LBS | HEART RATE: 83 BPM | HEIGHT: 63 IN

## 2019-12-16 DIAGNOSIS — E88.819 INSULIN RESISTANCE: ICD-10-CM

## 2019-12-16 DIAGNOSIS — E66.01 MORBID OBESITY (H): ICD-10-CM

## 2019-12-16 DIAGNOSIS — Z00.00 ROUTINE GENERAL MEDICAL EXAMINATION AT A HEALTH CARE FACILITY: Primary | ICD-10-CM

## 2019-12-16 PROCEDURE — 99396 PREV VISIT EST AGE 40-64: CPT | Performed by: OBSTETRICS & GYNECOLOGY

## 2019-12-16 RX ORDER — PHENTERMINE HYDROCHLORIDE 15 MG/1
15 CAPSULE ORAL EVERY MORNING
Qty: 90 CAPSULE | Refills: 1 | Status: SHIPPED | OUTPATIENT
Start: 2019-12-16 | End: 2022-01-11

## 2019-12-16 RX ORDER — TOPIRAMATE 50 MG/1
50 TABLET, FILM COATED ORAL 2 TIMES DAILY
Qty: 180 TABLET | Refills: 1 | Status: SHIPPED | OUTPATIENT
Start: 2019-12-16 | End: 2022-01-16

## 2019-12-16 RX ORDER — METFORMIN HCL 500 MG
500 TABLET, EXTENDED RELEASE 24 HR ORAL 2 TIMES DAILY WITH MEALS
Qty: 180 TABLET | Refills: 3 | Status: SHIPPED | OUTPATIENT
Start: 2019-12-16 | End: 2022-01-11

## 2019-12-16 ASSESSMENT — MIFFLIN-ST. JEOR: SCORE: 1867.91

## 2019-12-16 NOTE — NURSING NOTE
"Initial /88 (BP Location: Right arm, Patient Position: Chair, Cuff Size: Adult Large)   Pulse 83   Temp 98.5  F (36.9  C) (Tympanic)   Resp 18   Ht 1.6 m (5' 3\")   Wt 123.4 kg (272 lb)   LMP 12/06/2019   Breastfeeding No   BMI 48.18 kg/m   Estimated body mass index is 48.18 kg/m  as calculated from the following:    Height as of this encounter: 1.6 m (5' 3\").    Weight as of this encounter: 123.4 kg (272 lb). .      "

## 2019-12-16 NOTE — PROGRESS NOTES
SUBJECTIVE:   CC: Israel Zabala is an 39 year old woman who presents for preventive health visit.  She would like to try to reduce her weight medically (h/o gastric bypass); goal @ 220lbs.    Healthy Habits:    Do you get at least three servings of calcium containing foods daily (dairy, green leafy vegetables, etc.)? yes    Amount of exercise or daily activities, outside of work: no day(s) per week    Problems taking medications regularly No    Medication side effects: No    Have you had an eye exam in the past two years? yes    Do you see a dentist twice per year? yes    Do you have sleep apnea, excessive snoring or daytime drowsiness?no          Today's PHQ-2 Score:   PHQ-2 (  [employer]) 2019   Q1: Little interest or pleasure in doing things 0 0   Q2: Feeling down, depressed or hopeless 0 1   PHQ-2 Score 0 1   Q1: Little interest or pleasure in doing things Not at all -   Q2: Feeling down, depressed or hopeless Several days -   PHQ-2 Score 1 -       Abuse: Current or Past(Physical, Sexual or Emotional)- No  Do you feel safe in your environment? Yes        Social History     Tobacco Use     Smoking status: Former Smoker     Packs/day: 0.10     Years: 8.00     Pack years: 0.80     Types: Cigarettes     Last attempt to quit: 2008     Years since quittin.2     Smokeless tobacco: Never Used   Substance Use Topics     Alcohol use: Yes     Comment: occasional     If you drink alcohol do you typically have >3 drinks per day or >7 drinks per week? No                     Reviewed orders with patient.  Reviewed health maintenance and updated orders accordingly - Yes  BP Readings from Last 3 Encounters:   19 132/88   19 (!) 172/100   04/15/19 (!) 140/91    Wt Readings from Last 3 Encounters:   19 123.4 kg (272 lb)   19 122.5 kg (270 lb)   04/15/19 123.8 kg (273 lb)                  Patient Active Problem List   Diagnosis     CARDIOVASCULAR SCREENING; LDL GOAL LESS THAN  160     Carcinoid tumor     Morbid obesity, unspecified obesity type (H)     Personal history of gastric bypass     Excessive bleeding in premenopausal period     White coat syndrome without diagnosis of hypertension     Nipple discharge in female     Past Surgical History:   Procedure Laterality Date     BRONCHOSCOPY FLEXIBLE  2013    related to carcinoid;  Flexible Bronchoscopy, Endobronchial Flexible Ultrasound;  Surgeon: Chris Sal MD;  Location: [facility] OR     C REMOVAL OF LUNG,BILOBECTOMY Right 2013    Carcinoid: Right Middle and Lower Lobe     ENDOBRONCHIAL ULTRASOUND FLEXIBLE  2013    Procedure: ENDOBRONCHIAL ULTRASOUND FLEXIBLE;;  Surgeon: Chris Sal MD;  Location: [facility] OR     GASTRIC BYPASS       HERNIORRHAPHY INCISIONAL (LOCATION)  2011    Procedure:HERNIORRHAPHY INCISIONAL (LOCATION); Incisional Hernia Repair upper abdomen midline       TONSILLECTOMY  age 7       Social History     Tobacco Use     Smoking status: Former Smoker     Packs/day: 0.10     Years: 8.00     Pack years: 0.80     Types: Cigarettes     Last attempt to quit: 2008     Years since quittin.2     Smokeless tobacco: Never Used   Substance Use Topics     Alcohol use: Yes     Comment: occasional     Family History   Problem Relation Age of Onset     Breast Cancer Maternal Grandmother 65     Cancer Maternal Grandmother         skin     Diabetes Maternal Grandfather      Heart Disease Paternal Grandmother         MI     Cerebrovascular Disease Paternal Grandmother      Heart Disease Paternal Grandfather         MI     Gastrointestinal Disease Brother         Colitis     Prostate Cancer Father      Cancer Mother         skin     Congenital Anomalies Daughter         imperforate anus         Current Outpatient Medications   Medication Sig Dispense Refill     Cholecalciferol (VITAMIN D) 2000 UNITS tablet Take 1 tablet by mouth daily       ferrous sulfate (IRON) 325 (65 FE) MG tablet  Take 1 tablet (325 mg) by mouth 2 times daily 60 tablet 2     metFORMIN (GLUCOPHAGE-XR) 500 MG 24 hr tablet Take 1 tablet (500 mg) by mouth 2 times daily (with meals) 180 tablet 3     Multiple Vitamins-Minerals (MULTIVITAMIN PO)        phentermine (ADIPEX-P) 15 MG capsule Take 1 capsule (15 mg) by mouth every morning 90 capsule 1     Phentermine HCl (LOMAIRA) 8 MG TABS Take by mouth daily       topiramate (TOPAMAX) 50 MG tablet Take 1 tablet (50 mg) by mouth 2 times daily 180 tablet 1     VITAMIN B-12 PO 1 TABLET ORALLY DAILY(HOME MED)       No Known Allergies    Mammogram Screening: Patient under age 50, mutual decision reflected in health maintenance.      Pertinent mammograms are reviewed under the imaging tab.  History of abnormal Pap smear: NO - age 30- 65 PAP every 3 years recommended  PAP / HPV Latest Ref Rng & Units 11/12/2018 9/14/2015 4/5/2012   PAP - NIL NIL NIL   PAP DATE - [facility] - - - -   HPV 16 DNA NEG:Negative Negative Negative -   HPV 18 DNA NEG:Negative Negative Negative -   OTHER HR HPV NEG:Negative Negative Negative -     Reviewed and updated as needed this visit by clinical staff  Tobacco  Allergies  Meds  Med Hx  Surg Hx  Fam Hx  Soc Hx        Reviewed and updated as needed this visit by Provider            ROS:  CONSTITUTIONAL: NEGATIVE for fever, chills, change in weight  INTEGUMENTARU/SKIN: NEGATIVE for worrisome rashes, moles or lesions  EYES: NEGATIVE for vision changes or irritation  ENT: NEGATIVE for ear, mouth and throat problems  RESP: NEGATIVE for significant cough or SOB  BREAST: NEGATIVE for masses, tenderness or discharge  CV: NEGATIVE for chest pain, palpitations or peripheral edema  GI: NEGATIVE for nausea, abdominal pain, heartburn, or change in bowel habits   female: positive for menstrual irregularlity  MUSCULOSKELETAL: NEGATIVE for significant arthralgias or myalgia  NEURO: NEGATIVE for weakness, dizziness or paresthesias  PSYCHIATRIC: NEGATIVE for changes in mood or  "affect    OBJECTIVE:   /88 (BP Location: Right arm, Patient Position: Chair, Cuff Size: Adult Large)   Pulse 83   Temp 98.5  F (36.9  C) (Tympanic)   Resp 18   Ht 1.6 m (5' 3\")   Wt 123.4 kg (272 lb)   LMP 12/06/2019   Breastfeeding No   BMI 48.18 kg/m    EXAM:  GENERAL: healthy, alert and no distress  GENERAL: morbid obesity  EYES: Eyes grossly normal to inspection, PERRL and conjunctivae and sclerae normal  HENT: ear canals and TM's normal, nose and mouth without ulcers or lesions  NECK: no adenopathy, no asymmetry, masses, or scars and thyroid normal to palpation  RESP: lungs clear to auscultation - no rales, rhonchi or wheezes  BREAST: normal without masses, tenderness or nipple discharge and no palpable axillary masses or adenopathy  CV: regular rate and rhythm, normal S1 S2, no S3 or S4, no murmur, click or rub, no peripheral edema and peripheral pulses strong  ABDOMEN: soft, nontender, no hepatosplenomegaly, no masses and bowel sounds normal   (female): normal female external genitalia, normal urethral meatus, vaginal mucosa pink, moist, well rugated, and normal cervix/adnexa/uterus without masses or discharge  MS: no gross musculoskeletal defects noted, no edema  SKIN: no suspicious lesions or rashes  NEURO: Normal strength and tone, mentation intact and speech normal  PSYCH: mentation appears normal, affect normal/bright    Diagnostic Test Results:  Labs reviewed in Epic    ASSESSMENT/PLAN:       ICD-10-CM    1. Routine general medical examination at a health care facility Z00.00    2. Morbid obesity (H) E66.01 topiramate (TOPAMAX) 50 MG tablet     phentermine (ADIPEX-P) 15 MG capsule   3. Insulin resistance E88.81 metFORMIN (GLUCOPHAGE-XR) 500 MG 24 hr tablet       COUNSELING:   Reviewed preventive health counseling, as reflected in patient instructions  Special attention given to:        options for perimenopausal DUB; pt will consider       Regular exercise       Healthy diet/nutrition    " "   Vision screening    Estimated body mass index is 48.18 kg/m  as calculated from the following:    Height as of this encounter: 1.6 m (5' 3\").    Weight as of this encounter: 123.4 kg (272 lb).    Weight management plan: will manage with diet, exercise, Phentermine 15mg QD and Topamax 50mg po BID; f/u 3 months   Check labs next visit     reports that she quit smoking about 11 years ago. Her smoking use included cigarettes. She has a 0.80 pack-year smoking history. She has never used smokeless tobacco.      Counseling Resources:  ATP IV Guidelines  Pooled Cohorts Equation Calculator  Breast Cancer Risk Calculator  FRAX Risk Assessment  ICSI Preventive Guidelines  Dietary Guidelines for Americans, 2010  USDA's MyPlate  ASA Prophylaxis  Lung CA Screening    Amaya Addison MD  [facility]  "

## 2020-02-22 ENCOUNTER — VIRTUAL VISIT (OUTPATIENT)
Dept: FAMILY MEDICINE | Facility: OTHER | Age: 40
End: 2020-02-22

## 2020-02-22 NOTE — PROGRESS NOTES
"Date: 2020 09:41:40  Clinician: Zac Ghotra  Clinician NPI: [id number]  Patient: Israel Zabala  Patient : 1980  Patient Address: [facility] [address], [place] [address]  Patient Phone: [phone]  Visit Protocol: URI  Patient Summary:  Israel is a 39 year old ( : 1980 ) female who initiated a Visit for cold, sinus infection, or influenza. When asked the question \"Please sign me up to receive news, health information and promotions from [employer].\", Israel responded \"No\".    Israel states her symptoms started gradually 3-6 days ago. After her symptoms started, they improved and then got worse again.   Her symptoms consist of a cough, facial pain or pressure, myalgia, a headache, malaise, nasal congestion, rhinitis, and tooth pain. She is experiencing difficulty breathing due to nasal congestion but she is not short of breath.   Symptom details     Nasal secretions: The color of her mucus is yellow.    Cough: Israel coughs a few times an hour and her cough is more bothersome at night. Phlegm comes into her throat when she coughs. She believes the phlegm causes the cough. The color of the phlegm is green, clear, yellow, and white.     Facial pain or pressure: The facial pain or pressure feels worse when bending over or leaning forward.     Headache: She states the headache is moderate (4-6 on a 10 point pain scale).     Tooth pain: The tooth pain is not caused by a cavity, recent dental work, or other mouth problems.      Israel denies having sore throat, ear pain, chills, fever, and wheezing. She also denies having recent facial or sinus surgery in the past 60 days, having a sinus infection within the past year, and taking antibiotic medication for the symptoms.   Precipitating events  She has not recently been exposed to someone with influenza. Israel has not been in close contact with any high risk individuals.   Israel has not traveled internationally in the last 14 days before the start of her " symptoms.   Pertinent medical history  Israel typically gets a yeast infection when she takes antibiotics. She has used fluconazole (Diflucan) to treat previous yeast infections. 2 doses of fluconazole (Diflucan) has typically been needed for symptoms to resolve in the past.  Weight: 268 lbs   Israel does not smoke or use smokeless tobacco.   She denies pregnancy and denies breastfeeding. She is currently menstruating.   Weight: 268 lbs    MEDICATIONS: phentermine oral, metformin oral, ALLERGIES: NKDA  Clinician Response:  Dear Israel,  Based on the information provided, you have a viral upper respiratory infection, otherwise known as a cold. Symptoms vary from person to person, but can include sneezing, coughing, a runny nose, sore throat, and headache and range from mild to severe.  Unfortunately, there are no medications that can cure a cold, so treatment is focused on controlling symptoms as much as possible. Most people gradually feel better until symptoms are gone in 1-2 weeks.  Medication information  Because you have a viral infection, antibiotics will not help you get better. Treating a viral infection with antibiotics could actually make you feel worse.  For more information on why I am not prescribing antibiotics, please watch this video: Antibiotics Aren't Always the Answer.  Self care  The following tips will keep you as comfortable as possible while you recover:     Rest    Drink plenty of water and other liquids    Take a hot shower to loosen congestion    Take a spoonful of honey to reduce your cough     When to seek care  Please be seen in a clinic or urgent care if new symptoms develop, or symptoms become worse.   Diagnosis: Viral URI  Diagnosis ICD: J06.9  Additional Clinician Notes: OTC symptomatic cares

## 2020-02-27 ENCOUNTER — VIRTUAL VISIT (OUTPATIENT)
Dept: FAMILY MEDICINE | Facility: OTHER | Age: 40
End: 2020-02-27

## 2020-02-27 ENCOUNTER — E-VISIT (OUTPATIENT)
Dept: FAMILY MEDICINE | Facility: CLINIC | Age: 40
End: 2020-02-27

## 2020-02-27 DIAGNOSIS — J01.90 ACUTE SINUSITIS WITH SYMPTOMS > 10 DAYS: Primary | ICD-10-CM

## 2020-02-27 PROCEDURE — 99421 OL DIG E/M SVC 5-10 MIN: CPT | Performed by: NURSE PRACTITIONER

## 2020-02-27 RX ORDER — AMOXICILLIN 875 MG
875 TABLET ORAL 2 TIMES DAILY
Qty: 20 TABLET | Refills: 0 | Status: SHIPPED | OUTPATIENT
Start: 2020-02-27 | End: 2020-03-08

## 2020-02-27 RX ORDER — FLUTICASONE PROPIONATE 50 MCG
1 SPRAY, SUSPENSION (ML) NASAL DAILY
Qty: 16 G | Refills: 0 | Status: SHIPPED | OUTPATIENT
Start: 2020-02-27 | End: 2023-01-19

## 2020-02-27 NOTE — PROGRESS NOTES
"Date: 2020 09:10:51  Clinician: Indu Perez  Clinician NPI: [id number]  Patient: Israel Zabala  Patient : 1980  Patient Address: [address], [place] [address]  Patient Phone: [phone]  Visit Protocol: URI  Patient Summary:  Israel is a 39 year old ( : 1980 ) female who initiated a Visit for cold, sinus infection, or influenza. When asked the question \"Please sign me up to receive news, health information and promotions from [employer].\", Israel responded \"No\".    Israel states her symptoms started gradually 10-13 days ago. After her symptoms started, they improved and then got worse again.   Her symptoms consist of a cough, facial pain or pressure, myalgia, a headache, enlarged lymph nodes, malaise, nasal congestion, and tooth pain. She is experiencing difficulty breathing due to nasal congestion but she is not short of breath.   Symptom details     Nasal secretions: The color of her mucus is green and yellow.    Cough: Israel coughs a few times an hour and her cough is more bothersome at night. Phlegm comes into her throat when she coughs. She believes the phlegm causes the cough. The color of the phlegm is green and yellow.     Facial pain or pressure: The facial pain or pressure feels worse when bending over or leaning forward.     Headache: She states the headache is moderate (4-6 on a 10 point pain scale).     Tooth pain: The tooth pain is not caused by a cavity, recent dental work, or other mouth problems.      Israel denies having sore throat, ear pain, chills, fever, rhinitis, and wheezing. She also denies having recent facial or sinus surgery in the past 60 days, having a sinus infection within the past year, and taking antibiotic medication for the symptoms.   Precipitating events  She has recently been exposed to someone with influenza. Israel has been in close contact with the following high risk individuals: people with asthma, heart disease or diabetes, " immunocompromised people, adults 65 or older, pregnant women, and children under the age of 5.   Israel has not traveled internationally in the last 14 days before the start of her symptoms.   Pertinent medical history  Israel typically gets a yeast infection when she takes antibiotics. She has used fluconazole (Diflucan) to treat previous yeast infections. 2 doses of fluconazole (Diflucan) has typically been needed for symptoms to resolve in the past.  Weight: 267 lbs   Israel does not smoke or use smokeless tobacco.   She denies pregnancy and denies breastfeeding. She has menstruated in the past month.   Weight: 267 lbs    MEDICATIONS: phentermine oral, metformin oral, ALLERGIES: NKDA  Clinician Response:  Dear Israel,  I am sorry you are not feeling well. To determine the most appropriate care for you, I would like you to be seen in person to further discuss your health history and symptoms.  You will not be charged for this Visit. Thank you for trusting us with your care.   Diagnosis: Refer for additional evaluation  Diagnosis ICD: R69

## 2020-02-27 NOTE — PATIENT INSTRUCTIONS

## 2020-03-10 ENCOUNTER — VIRTUAL VISIT (OUTPATIENT)
Dept: FAMILY MEDICINE | Facility: OTHER | Age: 40
End: 2020-03-10
Payer: COMMERCIAL

## 2020-03-10 NOTE — PROGRESS NOTES
"Date: 03/10/2020 05:17:42  Clinician: Indu Pitt  Clinician NPI: [id number]  Patient: Israle Zabala  Patient : 1980  Patient Address: [address], [place] [address]  Patient Phone: [phone]  Visit Protocol: Yeast infection  Patient Summary:  Israel is a 39 year old ( : 1980 ) female who initiated a Visit for a presumed vaginal yeast infection. When asked the question \"Please sign me up to receive news, health information and promotions from [employer].\", Israel responded \"No\".    Israel began noticing vaginal burning, vaginal irritation, vaginal discharge, and vaginal pruritus 1-3 days ago.   Symptom details   Vaginal discharge: She has a more than normal amount of white, chunky (like cottage cheese) discharge. The discharge does not have a fishy smell.    She denies having abdominal pain, blisters, and open sores. She also denies feeling feverish.   She has not tried to treat her current symptoms with any medication.   Precipitating events  Israel denies having a sexually transmitted disease.   Pertinent medical history  Israel has a history of vaginal yeast infections. She has had one (1) occurrence in the past year and the current symptoms are similar to previous yeast infections. She has used fluconazole (Diflucan) to treat previous yeast infections. 2 doses of fluconazole (Diflucan) has typically been needed for symptoms to resolve in the past.  She prefers a pill. She is currently taking or has taken antibiotics in the past 2 weeks.   She does NOT smoke or use smokeless tobacco.   She denies pregnancy and denies breastfeeding. She has menstruated in the past month.      MEDICATIONS: phentermine oral, metformin oral, ALLERGIES: NKDA  Clinician Response:  Dear Israel,  Based on the information you have provided, you likely have a vaginal yeast infection which is a common infection of the vagina caused by a fungus. Yeast are a type of fungus.  The most common symptom of a yeast " infection is itching in and around the vagina. Other signs and symptoms include burning, redness, or a thick, white vaginal discharge that looks like cottage cheese and does not have a bad smell.  Medication information  I am prescribing:     Fluconazole (Diflucan) 150 mg oral tablet. Take 1 tablet by mouth in a single dose. Repeat dose in 3 days if symptoms are still present. There are no refills with this prescription.   Self care  Steps you can take to prevent symptoms of future vaginal yeast infection:     Avoid irritants such as scented bath products, tampons, pads, or vaginal sprays and powders    Avoid douching    Wear cotton underwear and if you are comfortable doing so, do not wear underwear to bed    Avoid hot tubs and whirlpool spas     When to seek care  Most women notice improvement in their symptoms within 1-2 days after starting treatment with complete clearing in 5-7 days.  Please make an appointment to be seen in a clinic or urgent care if any of the following occur:     Your symptoms have not improved in 3 days or not resolved in 10 days    You develop new symptoms or your symptoms become worse    If you think you may be at risk for an STD      Diagnosis: Candida vulvovaginitis  Diagnosis ICD: B37.3  Prescription: fluconazole (Diflucan) 150 mg oral tablet 2 tablet, 4 days supply. Take 1 tablet by mouth in a single dose, repeat dose in 3 days if symptoms are still present. Refills: 0, Refill as needed: no, Allow substitutions: yes  Pharmacy: [facility] - [phone] - [address] [place] [phone]

## 2020-04-11 ENCOUNTER — APPOINTMENT (OUTPATIENT)
Dept: CT IMAGING | Facility: CLINIC | Age: 40
End: 2020-04-11
Attending: STUDENT IN AN ORGANIZED HEALTH CARE EDUCATION/TRAINING PROGRAM
Payer: COMMERCIAL

## 2020-04-11 ENCOUNTER — HOSPITAL ENCOUNTER (EMERGENCY)
Facility: CLINIC | Age: 40
Discharge: HOME | End: 2020-04-11
Attending: STUDENT IN AN ORGANIZED HEALTH CARE EDUCATION/TRAINING PROGRAM | Admitting: STUDENT IN AN ORGANIZED HEALTH CARE EDUCATION/TRAINING PROGRAM
Payer: COMMERCIAL

## 2020-04-11 VITALS
HEART RATE: 97 BPM | TEMPERATURE: 97.8 F | WEIGHT: 270 LBS | OXYGEN SATURATION: 97 % | BODY MASS INDEX: 49.69 KG/M2 | HEIGHT: 62 IN | RESPIRATION RATE: 18 BRPM | DIASTOLIC BLOOD PRESSURE: 79 MMHG | SYSTOLIC BLOOD PRESSURE: 141 MMHG

## 2020-04-11 DIAGNOSIS — S00.03XA HEMATOMA OF SCALP, INITIAL ENCOUNTER: ICD-10-CM
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
## 2020-04-11 DIAGNOSIS — S09.90XA CLOSED HEAD INJURY, INITIAL ENCOUNTER: ICD-10-CM

## 2020-04-11 PROBLEM — K91.2 POSTOPERATIVE MALABSORPTION: Status: ACTIVE | Noted: 2018-12-27

## 2020-04-11 PROCEDURE — 70450 CT HEAD/BRAIN W/O DYE: CPT

## 2020-04-11 PROCEDURE — 72125 CT NECK SPINE W/O DYE: CPT

## 2020-04-11 PROCEDURE — 99284 EMERGENCY DEPT VISIT MOD MDM: CPT | Mod: Z6 | Performed by: STUDENT IN AN ORGANIZED HEALTH CARE EDUCATION/TRAINING PROGRAM

## 2020-04-11 PROCEDURE — 90471 IMMUNIZATION ADMIN: CPT | Performed by: STUDENT IN AN ORGANIZED HEALTH CARE EDUCATION/TRAINING PROGRAM

## 2020-04-11 PROCEDURE — 90715 TDAP VACCINE 7 YRS/> IM: CPT | Performed by: STUDENT IN AN ORGANIZED HEALTH CARE EDUCATION/TRAINING PROGRAM

## 2020-04-11 PROCEDURE — 25000128 H RX IP 250 OP 636: Performed by: STUDENT IN AN ORGANIZED HEALTH CARE EDUCATION/TRAINING PROGRAM

## 2020-04-11 PROCEDURE — 99284 EMERGENCY DEPT VISIT MOD MDM: CPT | Mod: 25 | Performed by: STUDENT IN AN ORGANIZED HEALTH CARE EDUCATION/TRAINING PROGRAM

## 2020-04-11 RX ADMIN — CLOSTRIDIUM TETANI TOXOID ANTIGEN (FORMALDEHYDE INACTIVATED), CORYNEBACTERIUM DIPHTHERIAE TOXOID ANTIGEN (FORMALDEHYDE INACTIVATED), BORDETELLA PERTUSSIS TOXOID ANTIGEN (GLUTARALDEHYDE INACTIVATED), BORDETELLA PERTUSSIS FILAMENTOUS HEMAGGLUTININ ANTIGEN (FORMALDEHYDE INACTIVATED), BORDETELLA PERTUSSIS PERTACTIN ANTIGEN, AND BORDETELLA PERTUSSIS FIMBRIAE 2/3 ANTIGEN 0.5 ML: 5; 2; 2.5; 5; 3; 5 INJECTION, SUSPENSION INTRAMUSCULAR at 20:28

## 2020-04-11 ASSESSMENT — MIFFLIN-ST. JEOR: SCORE: 1842.96

## 2020-04-11 NOTE — ED AVS SNAPSHOT
[facility] Emergency Department  [address] [address]  Phone:  [phone]  Fax:  [phone]                                    Israel Zabala   MRN: [id number]    Department:  [facility] Emergency Department   Date of Visit:  4/11/2020           After Visit Summary Signature Page    I have received my discharge instructions, and my questions have been answered. I have discussed any challenges I see with this plan with the nurse or doctor.    ..........................................................................................................................................  Patient/Patient Representative Signature      ..........................................................................................................................................  Patient Representative Print Name and Relationship to Patient    ..................................................               ................................................  Date                                   Time    ..........................................................................................................................................  Reviewed by Signature/Title    ...................................................              ..............................................  Date                                               Time          22EPIC Rev 08/18
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
## 2020-11-07 ENCOUNTER — HEALTH MAINTENANCE LETTER (OUTPATIENT)
Age: 40
End: 2020-11-07

## 2020-12-18 ENCOUNTER — OFFICE VISIT (OUTPATIENT)
Dept: FAMILY MEDICINE | Facility: CLINIC | Age: 40
End: 2020-12-18
Payer: COMMERCIAL

## 2020-12-18 VITALS
HEART RATE: 78 BPM | RESPIRATION RATE: 16 BRPM | DIASTOLIC BLOOD PRESSURE: 86 MMHG | TEMPERATURE: 99 F | WEIGHT: 271 LBS | SYSTOLIC BLOOD PRESSURE: 142 MMHG | OXYGEN SATURATION: 98 % | HEIGHT: 62 IN | BODY MASS INDEX: 49.87 KG/M2

## 2020-12-18 DIAGNOSIS — Z13.220 LIPID SCREENING: ICD-10-CM

## 2020-12-18 DIAGNOSIS — E66.01 OBESITY, CLASS III, BMI 40-49.9 (MORBID OBESITY) (H): ICD-10-CM

## 2020-12-18 DIAGNOSIS — Z00.00 ROUTINE GENERAL MEDICAL EXAMINATION AT A HEALTH CARE FACILITY: Primary | ICD-10-CM

## 2020-12-18 DIAGNOSIS — E88.819 INSULIN RESISTANCE: ICD-10-CM

## 2020-12-18 DIAGNOSIS — E55.9 VITAMIN D DEFICIENCY: ICD-10-CM

## 2020-12-18 LAB
ANION GAP SERPL CALCULATED.3IONS-SCNC: 5 MMOL/L (ref 3–14)
BUN SERPL-MCNC: 13 MG/DL (ref 7–30)
CALCIUM SERPL-MCNC: 9.5 MG/DL (ref 8.5–10.1)
CHLORIDE SERPL-SCNC: 105 MMOL/L (ref 94–109)
CHOLEST SERPL-MCNC: 253 MG/DL
CO2 SERPL-SCNC: 29 MMOL/L (ref 20–32)
CREAT SERPL-MCNC: 0.71 MG/DL (ref 0.52–1.04)
GFR SERPL CREATININE-BSD FRML MDRD: >90 ML/MIN/{1.73_M2}
GLUCOSE SERPL-MCNC: 128 MG/DL (ref 70–99)
HBA1C MFR BLD: 6.2 % (ref 0–5.6)
HDLC SERPL-MCNC: 67 MG/DL
LDLC SERPL CALC-MCNC: 155 MG/DL
NONHDLC SERPL-MCNC: 186 MG/DL
POTASSIUM SERPL-SCNC: 4.3 MMOL/L (ref 3.4–5.3)
SODIUM SERPL-SCNC: 139 MMOL/L (ref 133–144)
TRIGL SERPL-MCNC: 156 MG/DL

## 2020-12-18 PROCEDURE — 82306 VITAMIN D 25 HYDROXY: CPT | Performed by: NURSE PRACTITIONER

## 2020-12-18 PROCEDURE — 83036 HEMOGLOBIN GLYCOSYLATED A1C: CPT | Performed by: NURSE PRACTITIONER

## 2020-12-18 PROCEDURE — 80061 LIPID PANEL: CPT | Performed by: NURSE PRACTITIONER

## 2020-12-18 PROCEDURE — 80048 BASIC METABOLIC PNL TOTAL CA: CPT | Performed by: NURSE PRACTITIONER

## 2020-12-18 PROCEDURE — 36415 COLL VENOUS BLD VENIPUNCTURE: CPT | Performed by: NURSE PRACTITIONER

## 2020-12-18 PROCEDURE — 99396 PREV VISIT EST AGE 40-64: CPT | Performed by: NURSE PRACTITIONER

## 2020-12-18 ASSESSMENT — ENCOUNTER SYMPTOMS
HEMATURIA: 0
NERVOUS/ANXIOUS: 0
FEVER: 0
HEADACHES: 0
ARTHRALGIAS: 0
ABDOMINAL PAIN: 0
BREAST MASS: 0
HEARTBURN: 0
SORE THROAT: 0
DIARRHEA: 0
CHILLS: 0
CONSTIPATION: 0
NAUSEA: 0
SHORTNESS OF BREATH: 0
EYE PAIN: 0
WEAKNESS: 0
PALPITATIONS: 0
COUGH: 1
JOINT SWELLING: 0
HEMATOCHEZIA: 0
PARESTHESIAS: 0
MYALGIAS: 0
DIZZINESS: 0
FREQUENCY: 0
DYSURIA: 0

## 2020-12-18 ASSESSMENT — MIFFLIN-ST. JEOR: SCORE: 1842.5

## 2020-12-18 NOTE — PROGRESS NOTES
"   SUBJECTIVE:   CC: Israel Zabala is an 40 year old woman who presents for preventive health visit.       Patient has been advised of split billing requirements and indicates understanding: Yes  Healthy Habits:     Getting at least 3 servings of Calcium per day:  Yes    Bi-annual eye exam:  Yes    Dental care twice a year:  Yes    Sleep apnea or symptoms of sleep apnea:  None    Diet:  Regular (no restrictions)    Frequency of exercise:  1 day/week    Duration of exercise:  15-30 minutes    Taking medications regularly:  Yes    Medication side effects:  None    PHQ-2 Total Score: 0    Additional concerns today:  No      Additional provider notes: Patient here for annual physical. She states she hasn't been taking any of her medication for the past 6 months. States there are many excuses, but that she \"just hasn't taken focused on self care.\" She is wanting labs and to restart medications if indicated.     BP: Elevated in office today. States she checks it frequently at work and it usually runs 130/80s.     Prediabetes/insulin resistance: she was previously on metformin for elevated A1C. Has not taken it in 6 months.     Hx Vitamin D deficiency: requesting labs.     Hx elevated Calcium: has not been on supplemental calcium due to that.     Hx elevated lipid panel: she is fasting today and would like labs drawn.    Obesity: has previously been on medication for obesity and was seeing Dr. Chand. Patient states she does not want to go back to them at this time. Hx of gastric bypass surgery.     Today's PHQ-2 Score:   PHQ-2 ( 1999 [employer]) 12/18/2020   Q1: Little interest or pleasure in doing things 0   Q2: Feeling down, depressed or hopeless 0   PHQ-2 Score 0   Q1: Little interest or pleasure in doing things Not at all   Q2: Feeling down, depressed or hopeless Not at all   PHQ-2 Score 0       Abuse: Current or Past (Physical, Sexual or Emotional) - No  Do you feel safe in your environment? Yes        Social History "     Tobacco Use     Smoking status: Former Smoker     Packs/day: 0.10     Years: 8.00     Pack years: 0.80     Types: Cigarettes     Quit date: 2008     Years since quittin.2     Smokeless tobacco: Never Used   Substance Use Topics     Alcohol use: Yes     Comment: occasional         Alcohol Use 2020   Prescreen: >3 drinks/day or >7 drinks/week? No   Prescreen: >3 drinks/day or >7 drinks/week? -       Reviewed orders with patient.  Reviewed health maintenance and updated orders accordingly - Yes      Mammogram Screening: Patient under age 50, mutual decision reflected in health maintenance. Has mammogram schedule for 2021.      Pertinent mammograms are reviewed under the imaging tab.  History of abnormal Pap smear: NO - age 30- 65 PAP every 3 years recommended  PAP / HPV Latest Ref Rng & Units 2018   PAP - NIL NIL NIL   PAP DATE - [facility] - - - -   HPV 16 DNA NEG:Negative Negative Negative -   HPV 18 DNA NEG:Negative Negative Negative -   OTHER HR HPV NEG:Negative Negative Negative -     Reviewed and updated as needed this visit by clinical staff  Tobacco  Allergies  Meds  Problems  Med Hx  Surg Hx  Fam Hx  Soc Hx          Reviewed and updated as needed this visit by Provider  Tobacco  Allergies  Meds  Problems  Med Hx  Surg Hx  Fam Hx         Past Medical History:   Diagnosis Date     Abnormal glandular Papanicolaou smear of cervix     Negative colpo per patient     Chickenpox      Chronic maxillary sinusitis 3/31/2006     CTS (carpal tunnel syndrome) 3/6/2014     Situational anxiety 2012    Related to her cancer diagnosis      Past Surgical History:   Procedure Laterality Date     BRONCHOSCOPY FLEXIBLE  2013    related to carcinoid;  Flexible Bronchoscopy, Endobronchial Flexible Ultrasound;  Surgeon: Chris Sal MD;  Location: [facility] OR     C REMOVAL OF LUNG,BILOBECTOMY Right 2013    Carcinoid: Right Middle and Lower Lobe      ENDOBRONCHIAL ULTRASOUND FLEXIBLE  2013    Procedure: ENDOBRONCHIAL ULTRASOUND FLEXIBLE;;  Surgeon: Chris Sal MD;  Location: [facility] OR     GASTRIC BYPASS  2003     HERNIORRHAPHY INCISIONAL (LOCATION)  2011    Procedure:HERNIORRHAPHY INCISIONAL (LOCATION); Incisional Hernia Repair upper abdomen midline       TONSILLECTOMY  age 7     OB History    Para Term  AB Living   2 2 2 0 0 2   SAB TAB Ectopic Multiple Live Births   0 0 0 0 2      # Outcome Date GA Lbr Jcarlos/2nd Weight Sex Delivery Anes PTL Lv   2 Term 10/31/12 38w6d 03:28 / 00:12 3.742 kg (8 lb 4 oz) F Vag-Spont INT  EAMON      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   HENT: Negative for congestion, ear pain, hearing loss and sore throat.    Eyes: Negative for pain and visual disturbance.   Respiratory: Positive for cough. Negative for shortness of breath.    Cardiovascular: Negative for chest pain, palpitations and peripheral edema.   Gastrointestinal: Negative for abdominal pain, constipation, diarrhea, heartburn, hematochezia and nausea.   Breasts:  Negative for tenderness, breast mass and discharge.   Genitourinary: Negative for dysuria, frequency, genital sores, hematuria, pelvic pain, urgency, vaginal bleeding and vaginal discharge.   Musculoskeletal: Negative for arthralgias, joint swelling and myalgias.   Skin: Negative for rash.   Neurological: Negative for dizziness, weakness, headaches and paresthesias.   Psychiatric/Behavioral: Negative for mood changes. The patient is  "not nervous/anxious.      CONSTITUTIONAL: NEGATIVE for fever, chills, change in weight  INTEGUMENTARU/SKIN: NEGATIVE for worrisome rashes, moles or lesions  EYES: NEGATIVE for vision changes or irritation  ENT: NEGATIVE for ear, mouth and throat problems  RESP: NEGATIVE for significant cough or SOB  BREAST: NEGATIVE for masses, tenderness or discharge  CV: NEGATIVE for chest pain, palpitations or peripheral edema  GI: NEGATIVE for nausea, abdominal pain, heartburn, or change in bowel habits  : NEGATIVE for unusual urinary or vaginal symptoms.    female: Periods are longer and less heavy than usual - reports this is due to premenopausal symptoms.  MUSCULOSKELETAL: NEGATIVE for significant arthralgias or myalgia  NEURO: NEGATIVE for weakness, dizziness or paresthesias  ENDOCRINE: NEGATIVE for temperature intolerance, skin/hair changes  HEME/ALLERGY/IMMUNE: NEGATIVE for bleeding problems  PSYCHIATRIC: NEGATIVE for changes in mood or affect     OBJECTIVE:   BP (!) 142/86   Pulse 78   Temp 99  F (37.2  C) (Tympanic)   Resp 16   Ht 1.575 m (5' 2\")   Wt 122.9 kg (271 lb)   SpO2 98%   BMI 49.57 kg/m    Physical Exam  GENERAL: healthy, alert and no distress, obese  EYES: Eyes grossly normal to inspection, PERRL and conjunctivae and sclerae normal  HENT: ear canals and TM's normal, nose and mouth without ulcers or lesions  NECK: no adenopathy, no asymmetry, masses, or scars and thyroid normal to palpation  RESP: lungs clear to auscultation - no rales, rhonchi or wheezes  BREAST: normal without masses, tenderness or nipple discharge and no palpable axillary masses or adenopathy  CV: regular rate and rhythm, normal S1 S2, no S3 or S4, no murmur, click or rub, no peripheral edema and peripheral pulses strong  ABDOMEN: soft, nontender, no hepatosplenomegaly, no masses and bowel sounds normal  MS: no gross musculoskeletal defects noted, no edema  SKIN: no suspicious lesions or rashes  NEURO: Normal strength and tone, " "mentation intact and speech normal  PSYCH: mentation appears normal, affect normal/bright    Diagnostic Test Results:  Labs reviewed in Epic    ASSESSMENT/PLAN:   (Z00.00) Routine general medical examination at a health care facility  (primary encounter diagnosis)  Comment: Normal exam. Due for PAP in 2021.  Plan:     (E88.81) Insulin resistance  Comment: Labs ordered. Will see what numbers say then likely restart Metforming.  Plan: Hemoglobin A1c, Basic metabolic panel  (Ca, Cl,        CO2, Creat, Gluc, K, Na, BUN)            (Z13.220) Lipid screening  Comment: Previously elevated; labs ordered.  Plan: Lipid panel reflex to direct LDL Fasting            (E55.9) Vitamin D deficiency  Comment: Previously low. Lab ordered.   Plan: Vitamin D Deficiency            (E66.01) Obesity, Class III, BMI 40-49.9 (morbid obesity) (H)  Comment: Chronic; not wanting to follow-up at this time.  Plan:     Patient has been advised of split billing requirements and indicates understanding: Yes  COUNSELING:  Reviewed preventive health counseling, as reflected in patient instructions       Regular exercise       Healthy diet/nutrition       Vision screening       Alcohol Use       Osteoporosis prevention/bone health    Estimated body mass index is 49.57 kg/m  as calculated from the following:    Height as of this encounter: 1.575 m (5' 2\").    Weight as of this encounter: 122.9 kg (271 lb).    Weight management plan: Discussed healthy diet and exercise guidelines Declined weight management referral at this time    She reports that she quit smoking about 12 years ago. Her smoking use included cigarettes. She has a 0.80 pack-year smoking history. She has never used smokeless tobacco.      Counseling Resources:  ATP IV Guidelines  Pooled Cohorts Equation Calculator  Breast Cancer Risk Calculator  BRCA-Related Cancer Risk Assessment: FHS-7 Tool  FRAX Risk Assessment  ICSI Preventive Guidelines  Dietary Guidelines for Americans, 2010  USDA's " MyPlate  ASA Prophylaxis  Lung CA Screening    GILDA Marcelino CNP  [facility]

## 2020-12-21 LAB — DEPRECATED CALCIDIOL+CALCIFEROL SERPL-MC: 33 UG/L (ref 20–75)

## 2021-01-12 DIAGNOSIS — Z12.31 VISIT FOR SCREENING MAMMOGRAM: ICD-10-CM

## 2021-01-12 PROCEDURE — 77063 BREAST TOMOSYNTHESIS BI: CPT | Mod: TC | Performed by: RADIOLOGY

## 2021-01-12 PROCEDURE — 77067 SCR MAMMO BI INCL CAD: CPT | Mod: TC | Performed by: RADIOLOGY

## 2021-01-20 ENCOUNTER — E-VISIT (OUTPATIENT)
Dept: URGENT CARE | Facility: CLINIC | Age: 41
End: 2021-01-20
Payer: COMMERCIAL

## 2021-01-20 DIAGNOSIS — B37.31 CANDIDAL VULVOVAGINITIS: Primary | ICD-10-CM

## 2021-01-20 PROCEDURE — 99421 OL DIG E/M SVC 5-10 MIN: CPT | Performed by: NURSE PRACTITIONER

## 2021-01-20 RX ORDER — FLUCONAZOLE 150 MG/1
150 TABLET ORAL ONCE
Qty: 1 TABLET | Refills: 0 | Status: SHIPPED | OUTPATIENT
Start: 2021-01-20 | End: 2021-01-20

## 2021-01-20 NOTE — PATIENT INSTRUCTIONS
Thank you for choosing us for your care. I have placed an order for a prescription so that you can start treatment. View your full visit summary for details by clicking on the link below. Your pharmacist will able to address any questions you may have about the medication.     If you re not feeling better within 2-3 days, please schedule an appointment.  You can schedule an appointment right here in [facility], or call [phone]  If the visit is for the same symptoms as your eVisit, we ll refund the cost of your eVisit if seen within seven days.      Yeast Infection (Candida Vaginal Infection)    You have a Candida vaginal infection. This is also known as a yeast infection. It is most often caused by a type of yeast (fungus) called Candida. Candida are normally found in the vagina. But if they increase in number, this can lead to infection and cause symptoms.  Symptoms of a yeast infection can include:    Clumpy or thin, white discharge, which may look like cottage cheese    Itching or burning    Burning with urination  Certain factors can make a yeast infection more likely. These can include:    Taking certain medicines, such as antibiotics or birth control pills    Pregnancy    Diabetes    Weak immune system  A yeast infection is most often treated with antifungal medicine. This may be given as a vaginal cream or pills you take by mouth. Treatment may last for about 1 to 7 days. Women with severe or recurrent infections may need longer courses of treatment.  Home care    If you re prescribed medicine, be sure to use it as directed. Finish all of the medicine, even if your symptoms go away. Note: Don t try to treat yourself using over-the-counter products without talking to your provider first. He or she will let you know if this is a good option for you.    Ask your provider what steps you can take to help reduce your risk of having a yeast infection in the future.    Follow-up care  Follow up with your  healthcare provider, or as directed.  When to seek medical advice  Call your healthcare provider right away if:    You have a fever of 100.4 F (38 C) or higher, or as directed by your provider.    Your symptoms worsen, or they don t go away within a few days of starting treatment.    You have new pain in the lower belly or pelvic region.    You have side effects that bother you or a reaction to the cream or pills you re prescribed.    You or any partners you have sex with have new symptoms, such as a rash, joint pain, or sores.  [employer] last reviewed this educational content on 10/1/2017    4255-7590 [employer]. [address] [place] [address], [place] [address]. All rights reserved. This information is not intended as a substitute for professional medical care. Always follow your healthcare professional's instructions.

## 2021-05-28 NOTE — PATIENT INSTRUCTIONS - HE
Plan:  1. Great job with regular walks and improving bariatric method, the weight is coming down.  2. Continue Lomaira up to 2-3 times daily if the last dose doesn't come within 5-6 hours of bedtime.  3. Continue metformin for borderline diabetes.  4. Low dose topamax trial, 25mg in the afternoons/evening. Stop if rash, mood swings. Some do experience tingling (see below) that is not dangerous.  5. Recheck BMP in mid to late May to recheck kidney function and A1c (labs printed today).      Topiramate for weight loss:    Topiramate (Topamax) is used to prevent seizures and migraines. Although it's not currently FDA approved for weight loss, it has been used safely for a number of years to help people lose weight.    It seems to work on areas of the brain to quiet signals related to eating and decreases appetite and cravings.    Topiramate may make some people feel:  -less interested in eating between meals.  -think less about food/eating.  -easier to push the plate away.  -giving up soda pop easier (generally makes it taste bad).  -have more control with portions.    How to take it:    1. Start at bedtime: one tablet, 25mg, nightly for a week.  2. Increase to 2 tablets (50mg) week 2 each night.  3. Increase to 3 tablets (75mg) the 3rd week and stay on this dose until follow up if tolerated. Back off to next lower dose if not tolerating it. Discontinue if rash/mood swing/dizziness or if feeling unwell.     Don't stop taking it if you don't think it's doing anything. It's effect can be subtle for some.  If prolonged use for months, we recommend tapering down over several weeks to reduce the risk of withdrawal/seizures.    Do not take with sleeping pills.    Higher doses are associated with more sedation/confusion/forgetfulness so we try to limit those side effects by keeping the dose to 75mg twice daily maximum in most cases.    Potential Side Effects:  The most common side effects are:  -Tingling in the hands/feet or  face.  -change in concentration.  -attention difficulty  -depression.  -increased body temperature.  -decreased sweating.  -increase risk of kidney stones.  -feeling sleepy/sedated tends to improve after a short time of use.    How to Store Topiramate (Topamax):  -Store in closed container at room temp away from heat/moisture.  -Keep out of the reach of children and pets.      When should I call my medical weight management team?  Call right away if yo notice any of the followin. Memory/speech problems.  2. Confusion/word finding difficulty (about 10% risk).   3. Change in vision as some glaucoma sx may worsen.  4. Nausea/vomiting feeling unwell for unclear reasons.    Contact call center if questions:  [phone].    What should I know before taking this medication?    1. Topiramate works best when you help it work:   -Decrease temptations around the house.   -stay away from situations that may trigger you.    Information about the Weight Loss Medication Phentermine    When combined with a commitment to diet and behavior changes, weight loss medications can be a nice additional tool to maximize your weight loss season.  There are no magic pills and without diet and behavior changes, weight loss will be minimal.  Think of this medication as a tool to make your diet and behavior changes easier and you'll enjoy a higher probability of success.  Remember not to skip meals, but use this medication to tolerate your reduced calories more easily.  If you are very hungry in the evenings, you are likely not eating enough in the first 10 hours of your day and need to focus on getting your protein requirements in at each of your 3 daily meals.      Phentermine is a stimulant medication related to the amphetamine class of medication but with a lower risk of dependence and addiction.  It is used for weight loss by suppressing the appetite region of the brain.  It also may speed up the metabolic rate and give a person more  "energy.  Like any medication there are potential side effects and the most common are:  Dry mouth occurs in almost everyone (hydrate well), fewer people experience Palpatations, fast heart rate, elevation of blood pressure, restlessness, insomnia, dizziness, change in mood, tremor, headache, changes in bowel movements,itchiness, changes in sex drive.    Some people can develop serious side effects which include:  Heart strain (\"ischemia\").  Tachycardia (fast heart rate or irregular heart rate).  Hypertension  Pulmonary Hypertension  Psychosis  Dependency and abuse has occurred in some.  If you've been on high dose (37.5mg) for long periods, phentermine should be tapered down over a few weeks before abruptly stopping as seizures have been reported rarely.    We do not recommend taking it in combination with the following medications due to potential drug interactions which can increase the risk of side effects and/or potential for seizures:    Absolutely contraindicated are:  Amphetamines or other stimulants like ADHD medication: (dextroamphetamine, amphetamine, diethylpropion, isocarboxazid, methamphetamine, lisdexamfetamine, benzphetamine,dexmethylphenidate, methylphenidate, selegiline patch, sibutramine, tranylcypromine.    Avoid use with:   Dopamine, dobutamine, ephedra, ephedrine, epinephrine, isoproterenol, linezolid, norepinephrine, phenylephrine injection, venlafaxine (Effexor).    Monitor or modify dose with:  Acebutolol, atenolol, betaxolol, bisoprolol, carvedilol, droxidopa, esmolol, labetalol, magnesium citrate, metoprolol, nadolol, nebivolol, penbutolol, pindolol, propranolol, sotalol, timolol.    Caution with: armodafinil,betaxolol eye drops, brexpiprazole, bupropion, busulfan, caffeine, carteolol drops, enzalutaminde, ginseng, green tea, guarana, levobunolol drops, lindane cream, modafinil, afrin nasal spray (oxymetazoline), pamabrom, phenylephrine oral and nasal spray, pseudoephedrine (sudafed), " rasgiline, sleegiline, bowel prep, tiagabine, timolol drops,  TRAMADOL due to increased risk of seizures.    The current cheapest place to fill your prescription is at [facility], [facility] or [facility] and is around $30 for 90 tablets.  Occasionally, [facility] and [facility] have price matched, so call around and get the best price for you.  Other pharmacies may charge closer to$70- $100 for the same prescription. You don't have to be a member to use the pharmacy at [facility] currently.  An alternative some patients have tried is using a voucher system through [employer].  $25 paid on their website gets you a  voucher that can allows you to pick your meds up at [facility] without paying anything more.    Dosing:  We start with half a tablet for the first 2 weeks and if tolerating it without problems, you can take up to one full tablet daily in the morning after breakfast.  For those with evening hunger problems, sometimes half a tablet in the morning and half a tablet around 1-2 pm can be effective, however, risks of nighttime insomnia/restless increase with afternoon dosing so call me at the clinic if considering this regimen or having any issues.  You only have to use the amount effective for you, not to exceed one full tablet.  It can also be used situationaly and does not have to be taken every day. As always, if any questions give us a call at the [facility] telephone:  [phone].          [facility]  Nutritional Guidelines  Gastric Bypass 18 Months Post Op and Beyond    General Guidelines and Helpful Hints:    Eat 3 meals per day + protein supplement(s). No snacks between meals.  o Do not skip meals.  This can cause overeating at the next meal and will prevent adequate protein and nutritional intake.    Aim for 60-80 grams of protein per day.  o Always eat your protein first. This assists with optimal nutrition and helps you stay full longer.  o Depending on your portion size, you may need  to drink approved protein supplement between meals to achieve protein goals. Follow recommendations of your Dietitian.     Eat your protein first, and then follow with fiber.   o It is not necessary to count your fiber, but 15-20 grams per day is recommended.    o Add fiber by including fruits, vegetables, whole grains, and beans.     Portions should remain about 1 cup per meal. Use measuring cups to be accurate.    Continue to use saucer/salad plates, infant/toddler silverware to keep portion sizes small and take small bites.    Eat S-[id number]-Y to make each meal last 20-30 minutes. Always stop eating when satisfied.    Continue to use caution with foods containing skins, peels or membranes. Chew well!    Aim for 64 oz. of calorie-free fluids daily.  o Continue to avoid caffeine and carbonation. If you choose to drink alcohol, do so in moderation.   o Remember to avoid drinking during meals, 15-30 minutes before and 30 minutes after.    Exercise is gonzalez for continued weight loss and weight maintenance. Aim for 30-60 minutes of physical activity most days of the week. Include cardiovascular and strength training.    If having trouble tolerating meat, try using a crock-pot, tinfoil tent, steamer or other moist cooking method to create tender meats. Add broth or low-fat gravy to help meat stay moist.     Avoid high sugar and high fat foods to prevent dumping syndrome.  o Check nutrition labels for less than 10 grams of sugar and less than 10 grams of fat per serving.    Continue Taking Vitamins/Minerals:  o 7682-6884 mcg of Sublingual B-12 daily  o 1 Multivitamin with Iron twice daily (chewable or swallow tabs)  o 500-600 mg Calcium Citrate twice daily (chewable or swallow tabs)  o 5000 IU Vitamin D3 daily    Sample Grocery List    Protein:    Fat free Greek or light yogurt (less than 10 grams sugar)    Fat free or low-fat cottage cheese    String cheese or reduced fat cheese slices    Tuna, salmon, crab, egg, or  chicken salad made with light or fat free mayonnaise    Egg or Egg Substitute    Lean/extra lean turkey, beef, bison, venison (ground, sirloin, round, flank)    Pork loin or tenderloin (grilled, baked, broiled)    Fish such as salmon, tuna, trout, tilapia, etc. (grilled, baked, broiled)    Tender cuts of lean (skinless) turkey or chicken    Lean deli meats: turkey, lean ham, chicken, lean roast beef    Beans such as kidney, garbanzo, black, landeros, or low-fat/fat free refried beans    Peanut butter (natural preferred). Limit to 1 Tbsp. per day.    Low-fat meatloaf (made with lean ground beef or turkey)    Sloppy Joes made with low-sugar ketchup and lean ground beef or turkey    Soy or vegetable protein (i.e. vegan crumbles, soy/veggie burger, tofu)    Hummus    Vegetables:    Fresh: cooked or raw (as tolerated)    Frozen vegetables    Canned vegetables (low sodium or no salt added, rinse before cooking/eating)    (Ok to have skins/peels/membranes/seeds - just chew well)    Fruits:    Fresh fruit    Frozen fruit (no sugar added)    Canned fruit (packed in its own juice, NOT syrup)    (Ok to have skins/peels/membranes/seeds - just chew well)    Starch:    Unsweetened whole-grain hot cereal (or high fiber cold cereal, dry)    Toasted whole wheat bread or [place] Thins    Whole grain crackers    Baked /boiled/mashed potato/sweet potato    Cooked whole grain pasta, brown rice, or other cooked whole grains    Starchy vegetables: corn, peas, winter squash    Protein Supplement:     Ready to drink protein shake with:  o 15-30 grams protein per serving  o Less than 10 grams total carbohydrate per serving     Protein powder mixed with:  o  Skim or 1% milk  o Low fat or fat free Lactaid milk, plain or no sugar added soymilk  o Water     Fats: (use in moderation)    1 teaspoon of soft tub margarine    1 teaspoon olive oil, canola oil, or peanut oil    1 tablespoon of low-fat frank or salad dressing     Sample Menu for 18+ months  after Gastric Bypass    You do NOT need to eat/drink the full portion sizes listed below  Always stop when you are satisfied    Breakfast   cup 1% cottage cheese     cup mixed berries   Lunch 2 oz lean roast beef on   Molt Thin with 1 tsp. light frank    small tomato, chopped, mixed with 1 tsp. light vinaigrette dressing   Supplement Approved protein supplement (if needed between meals)   Dinner 2 oz grilled salmon    cup salad greens with 1 tsp. light salad dressing and 1 tsp. ground flax seed    cup quinoa or brown rice     Breakfast   cup egg substitute with   cup sautéed chopped vegetables  2 light [place] Krisp crackers   Lunch Tuna Melt:   cup tuna mixed with 1 tsp. light frank over   Molt Thin. Top with 2-3 slices cucumber and 1 oz slice of low fat cheese   Supplement 1 cup skim milk (if needed between meals)   Dinner 3 oz  grilled, broiled, or baked seasoned skinless chicken breast    cup asparagus     Breakfast   cup plain oatmeal made with skim or 1% milk with 1 Tbsp. flavored/unflavored protein powder added  1 mozzarella string cheese   Lunch 2 oz deli turkey breast  1/3 cup salad with 1 tsp. light salad dressing, 1/8 of a whole avocado and 1 Tbsp. sunflower seeds   Dinner 3 oz. pork loin made in a crock pot, seasoned with a spice rub    cup cooked carrots   Supplement Approved protein supplement (if needed between meals)     Breakfast 1 cup breakfast casserole made with egg substitute, turkey sausage,  and steamed, chopped bell peppers   Supplement  1 cup light Greek yogurt (if needed between meals)   Lunch 2 oz. teriyaki turkey    cup mashed sweet potato with 1-2 spritzes of spray butter (like Parkay)    cup fresh pineapple   Dinner 3 oz low fat meatloaf    cup roasted garlic zucchini     Breakfast   cup leftover breakfast casserole    cup no sugar added applesauce with 1 Tbsp. unflavored protein powder and a sprinkle of cinnamon    Lunch 3 oz shrimp with 1-2 Tbsp. low-sugar cocktail sauce for  "dipping    c. whole wheat pasta drizzled with   tsp. olive oil   Supplement 1 cup skim/1% milk with scoop of protein powder (if needed between meals)   Dinner Grilled, seasoned kebob with 2 oz lean beef and   cup vegetables     Breakfast Breakfast pizza:   [place] Thin spread with 1 Tbsp. low sugar spaghetti sauce,   cup shredded low fat cheese, melted and 1 slice of Zenda harmon     cup fresh fruit mixed with chopped almonds   Lunch   cup black bean soup  4-5 whole grain crackers   Dinner 3 oz  tilapia with lemon pepper seasoning    cup stewed tomatoes   Supplement 1 string cheese (if needed between meals)     Breakfast 2 hard boiled eggs (discard 1 egg yolk)    whole wheat English Muffin with 1 tsp. low sugar jelly   Lunch   cup leftover black bean soup topped with 1-2 Tbsp. low fat cheese  2-3 light Rye Krisp crackers   Supplement Approved protein supplement (if needed between meals)   Dinner 3 oz sirloin steak    cup steamed broccoli     WEIGHT MAINTENANCE STRATEGIES AFTER WEIGHT LOSS SEASON    According to the National Weight Control Registry there are several things that people who have lost weight and kept it off have in common. Some of them are...    1. 3 MEALS A DAY:  Make sure you are eating 3 meals each day.  No skipping meals.  80% of people who skip meals are overweight or obese.  Missing meals slows the metabolism, making it harder to maintain a healthy weight.  Starting the day with some protein (10-20 Grams minimum like an egg and some yogurt) is crucial for avoiding afternoon hunger.     2. FOOD DIARY:  Much like keeping a ledger for your checkbook, keeping a food and exercise diary helps you \"keep track\" of the balance of energy (calories) in and energy out. It also helps you recognize potential unhealthy deviations from healthy patterns before they become habit. It's a nice way to monitor whether you are getting the protein, fiber, and other nutrients that your body needs.  It also provides a " "reference for figuring out why things are getting off track and learning how YOUR body loses/hold on to it's weight.  This tool is cheap and research has proven it to be one of the biggest helps when making a diet/behavior change.    3. FOLLOW-UP:  Studies show that those who follow up with their health professional regularly maintained their weight loss and those who are \"lost to follow-up \"are at risk for regain.  Moreover, it is essential to monitor vitamin levels with laboratory studies for life following gastric bypass surgery. If your frustrated or feeling defeated that is the time to work with us rather than stop the program.    4.  HIGH FIBER/LOW FAT:  Lean sources of protein (skim milk, skinless, baked or broiled chicken breast, fish, etc.)  will help you meet your protein needs while fruits, vegetables, and whole grains will help you get the fiber that your body needs.  This is heart healthy eating and helps to keep calorie levels in balance.  Some fat is important in maintaining good so 2-3 tablespoons of olive oil, 1 oz of unflavored nuts (almonds, brazil nuts, walnuts) daily is a good way to get the good stuff or taking a teaspoon of fish oil daily ([employer] is a good brand, lemon flavored isn't too fishy).    5.  8,000 STEPS PER DAY AND AT LEAST TWICE WEEKLY STRENGTH TRAINING:  This is a \"weight maintenance dose. \"  It is essential to get your steps in every day, 7 days a week.  You don't have to \"work out \" 7 days a week, but throughout the day, getting 8000 steps will help you maintain the weight you have lost. Trying to make at least 0015-4759 steps daily at a moderate to brisk pace (about to miss the bus pace). Parking far away, taking the stairs instead of the elevator, and pacing while on the phone are some ways to help achieve this goal.  A good rule of thumb is that it takes about 100 kilocalories of exercise each day for every 5% reduction in weight during weight loss season to off set " "the slowed metabolism and reduce the risk of weight regain.  Since we can usually only restrict our diet so much for so long, exercise makes it easier to maintain on the days that we're not perfect with our diet.    6.  Eat at home 90% OF THE TIME:  People who maintain a healthy weight eat at home, or meal prepared at home, 90% of the time.  Studies show that people consume an average of 770 page when eating out at a restaurant and 440 page when eating a meal prepared at home.  This equates to almost 35 pounds of excess weight for a person who eats out once a day for 1 year.      7.  Have a reward.  People that are working towards a major habit/behavior change like weight loss do better and complete the program when they have a reward they are working towards.  It should be special, something you wouldn't otherwise allow your self and be a non-food related reward. For example: a trip, a piece of jewelry, a special outfit, an adventure or outing (ideally one that uses your fitter/new body), a new bicycle...Ideally, it celebrates your weight loss and promotes a healthy life style.    8.  Eat Breakfast, bigger is better.  A study last year showed that people lose more weight if they have a large Breakfast, medium Lunch and smaller Dinner rather than vice versa.  When fueled well during the day, we tend to move more and snack less in the evening hours.  Try it!    OTHER HELPFUL HABITS    -Minimize liquid calories.  Water is zahra, unsweetened or brewed tea is also minimal calorie.   Try to minimize reliance on artificial sweeteners, less or none is probably better.    -Avoiding \"mindless\" eating, i.e., eating at the TV, and the car, in front of the computer.  Eating should have a purpose of nourishment rather than something you do when bored.   By avoiding distracted eating, this generally cuts portions/servings/snacks 10-15%.      -For treats, stop eating when you no longer taste the treat.  Think about each bite and " enjoy each bite rather than racing to the finish.    -Protect your sleep and Manage your stress.  Getting out for 20-60 minutes of walking/exercise/cycling/gardening/yardwork etc is a great way to shed the frustrations of the day and improve stress reduction and bedtime relaxation.    -Weigh Yourself every day if possible when trying to lose weight. Do it at the same time everyday, ideally in the morning before or after getting out of the shower.  Weight loss bounces up and down but over a 5-6 day period a downtrend should occur and if not, go back to the food and activity tracking and give me a call if any mysteries as to why things aren't going well and we'll look at your serving size estimations or other factors that might be impeding weight loss.    -Let supper be your last food of the day and stick to water in the evenings at least 5 days out of the week.  Having a 12-14 hour period of fasting from supper to breakfast is quite beneficial for health/longevity/blood sugar levels and weight management as long as you're adequately fueled the other 12 hours of the day.    -For people with anxiety, stress or relaxation problems, I recommend the following breathing technique:    4,7,8 breathing is a non medication based way to decrease stress and anxiety symptoms, improve sleeping issues.  It can be done as often as necessary but I recommend at to perform it idealy at bedtime and first thing in the morning to keep your stress response in a reasonable range.  To perform 4, 7, 8 breathin.  Place the tip of your tongue lightly behind the gum of your front teeth.   2.  Breath in through your mouth for 4 seconds.  3.  Hold your breath for 7 seconds.  4.  Keeping the tongue in place, breath out through your mouth with a slight hissing noise for 8 seconds.    Repeat 4 times.  Do this when going to bed and waking up each day, or if any high stress situation arises and your stress hormones will come down.

## 2021-05-28 NOTE — PROGRESS NOTES
"Bariatric Clinic Follow-Up Visit:    Israel Zabala is a 38 y.o.  female with Body mass index is 48.23 kg/m .  presenting here today for follow-up on non-surgical efforts for weight loss. Original Intake visit occurred on 12/27/18 with a weight of 276 lbs and BMI of 49.3  .  Along with diet and behavior changes, she has been using Lomaira to complement improved bariatric method from her 2003 RNY gastric bypass done by Dr. Dey at [facility](preop 284 lbs). See her intake visit notes for details on identified contributors to weight gain in the past.  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  She's now developed borderline diabetes w/ an A1c of 6.4% and was started on metformin to assist glycemic control and was found ot have secondary, non renal hyperparathyroidism due to vitamin D deficiency on her intake labs.    Weight:   Wt Readings from Last 2 Encounters:   04/24/19 (!) 270 lb 1.6 oz (122.5 kg)   02/27/19 (!) 271 lb (122.9 kg)    pounds  Height: 5' 2.75\" (1.594 m) (4/24/2019  8:59 AM)  Initial Weight: 276.2 lbs (2/14/2019 10:00 AM)  Weight: (!) 270 lb 1.6 oz (122.5 kg) (4/24/2019  8:59 AM)  Weight loss from initial: 1.3 (2/14/2019 10:00 AM)  % Weight loss: 0.47 % (2/14/2019 10:00 AM)  BMI (Calculated): 48.2 (4/24/2019  8:59 AM)  SpO2: 99 % (4/24/2019  8:59 AM)  Waist Circumference (In): 58.5 Inches (12/27/2018  9:22 AM)  Hip Circumference (In): 61 Inches (12/27/2018  9:22 AM)  Neck Circumference (In): 15 Inches (12/27/2018  9:22 AM)      Comorbidities:  Patient Active Problem List   Diagnosis     Postoperative malabsorption     Hx of gastric bypass     Obesity, Class III, BMI 40-49.9 (morbid obesity) (H)       Current Outpatient Medications:      calcium carb/vit D3/minerals (CALCIUM-VITAMIN D ORAL), Take 1 tablet by mouth., Disp: , Rfl:      cyanocobalamin, " vitamin B-12, (VITAMIN B-12 ORAL), Take by mouth., Disp: , Rfl:      ferrous sulfate 325 (65 FE) MG tablet, Take 325 mg by mouth., Disp: , Rfl:      metFORMIN (GLUCOPHAGE) 500 MG tablet, Start once daily with supper for 2 weeks then increase to twice daily dosing (breakfast and supper)., Disp: 180 tablet, Rfl: 1     multivitamin with minerals tablet, Take by mouth., Disp: , Rfl:      phentermine 8 mg Tab, One tablet every 6 hours, maximum of three times daily for appetite suppression., Disp: 90 each, Rfl: 1     topiramate (TOPAMAX) 25 MG tablet, Take 1 tablet (25 mg total) by mouth daily., Disp: 90 tablet, Rfl: 0      Interim: Since our last visit, she has been using Lomaira to help curb mindless eating/grazing behaviors that had contributed to weight gain and  Has seen our dietician in February having set goals for tracking food and increased elliptical use to 3x weekly or more.  She' finds today, some trouble w/ second dose of lomaira, tolerating her metformin. Now doing regular walks and taking her girls out.  beverages better from meals. Tolerating Lomaira but finding it hard to use in afternoons due to schedule, discussed low dose topamax addition today and she's open to it.  Upper GI was normal w/ mild reflux.  Plan:   1. Great job with regular walks and improving bariatric method, the weight is coming down.  2. Continue Lomaira up to 2-3 times daily if the last dose doesn't come within 5-6 hours of bedtime.  3. Continue metformin for borderline diabetes.  4. Low dose topamax trial, 25mg in the afternoons/evening. Stop if rash, mood swings. Some do experience tingling (see below) that is not dangerous.  5. Recheck BMP in mid to late May to recheck kidney function and A1c (labs printed today).  6. Continue good bariatric method.    We discussed [facility] Bariatric Basics including:  -eating 3 meals daily  -eating protein first  -eating slowly, chewing food well  -avoiding/limiting calorie containing  beverages  -We discussed the importance of restorative sleep and stress management in maintaining a healthy weight.  -We discussed the National Weight Control Registry healthy weight maintenance strategies and ways to optimize metabolism.  -We discussed the importance of physical activity including cardiovascular and strength training in maintaining a healthier weight and explored viable options.    Most recent labs:  Lab Results   Component Value Date    WBC 6.8 12/27/2018    HGB 14.4 12/27/2018    HCT 40.7 12/27/2018    MCV 94 12/27/2018     12/27/2018     No results found for: CHOL  No results found for: HDL  No results found for: LDLCALC  No results found for: TRIG  No components found for: CHOLHDL  Lab Results   Component Value Date    ALT 15 12/27/2018    AST 19 12/27/2018    ALKPHOS 85 12/27/2018    BILITOT 0.2 12/27/2018     Lab Results   Component Value Date    HGBA1C 6.4 (H) 12/27/2018     Lab Results   Component Value Date    UUHQLYGO85 917 (H) 12/27/2018     Lab Results   Component Value Date    PDYQFADU46XX 25.7 (L) 12/27/2018     Lab Results   Component Value Date    FERRITIN 42 12/27/2018     Lab Results   Component Value Date    PTH 94 (H) 12/27/2018     No results found for: 51889  No results found for: 7597  Lab Results   Component Value Date    TSH 2.03 12/27/2018     No results found for: TESTOSTERONE    DIETARY HISTORY    Positive Changes Since Last Visit: see above  Struggling With: seeing results, some frustrations.    Knowledgeable in Reading Food Labels: improving  Getting Adequate Protein: improving  Sleeping 7-8 hours/day improving  Stress management walking more helps. Demonstrated 4, 7, 8 breathing today as well.     PHYSICAL ACTIVITY PATTERNS:  Cardiovascular: walking  Strength Training: some body weight exercises again. PT work.     REVIEW OF SYSTEMS  GENERAL/CONSTITUTIONAL:  nl  HEENT:   nl  CARDIOVASCULAR:   nl  PULMONARY:    "nl  GASTROINTESTINAL:  nl  UROLOGIC:  nl  NEUROLOGIC:  nl  PSYCHIATRIC:  nl  MUSCULOSKELETAL/RHEUMATOLOGIC   nl  ENDOCRINE:  nl  DERMATOLOGIC:  nl    PHYSICAL EXAM:  Vitals: /78 (Patient Site: Right Arm, Patient Position: Sitting, Cuff Size: Adult Large)   Pulse 71   Ht 5' 2.75\" (1.594 m)   Wt (!) 270 lb 1.6 oz (122.5 kg)   SpO2 99%   Breastfeeding? No   BMI 48.23 kg/m    Height: 5' 2.75\" (1.594 m) (4/24/2019  8:59 AM)  Initial Weight: 276.2 lbs (2/14/2019 10:00 AM)  Weight: (!) 270 lb 1.6 oz (122.5 kg) (4/24/2019  8:59 AM)  Weight loss from initial: 1.3 (2/14/2019 10:00 AM)  % Weight loss: 0.47 % (2/14/2019 10:00 AM)  BMI (Calculated): 48.2 (4/24/2019  8:59 AM)  SpO2: 99 % (4/24/2019  8:59 AM)  Waist Circumference (In): 58.5 Inches (12/27/2018  9:22 AM)  Hip Circumference (In): 61 Inches (12/27/2018  9:22 AM)  Neck Circumference (In): 15 Inches (12/27/2018  9:22 AM)      GEN: Pleasant, well groomed, in no acute distress  HEENT: PEERL, EOMI, airway clear .  NECK: No swelling.  HEART: Rhythm regular, rate regular, no murmur   LUNGS: Clear without crackles or wheezes. No cough.  ABDOMEN: obese.  EXTREMITIES: 2 plus palpable peripheral pulses, radial.  NEURO: Alert and Oriented X3, normal gait and speech.  SKIN: No visible rashes.        25 minutes was spent in direct consultation, with over 50% of it spent in counseling regarding their plan for excess weight reduction and health modification.  Shon Chand MD  [facility]  8:54 AM    "

## 2021-05-28 NOTE — PROGRESS NOTES
Non-surgical Weight Loss Follow Up Diet Evaluation    Assessment:  This patient is a 38 y.o. female is being seen today for follow-up non-surgical nutritional evaluation. Today we reviewed the patients current eating habits and level of physical activity, and instructed on the changes that are required for successful weight loss outcomes.    Metformin, Lomaira, Topamax: taking    Pt's Initial Weight: 276.2 lbs  Weight: (!) 269 lb 6.4 oz (122.2 kg)  Weight loss from initial: 6.8  % Weight loss: 2.46 %    BMI: Body mass index is 48.1 kg/m .  IBW: 110 lbs     Pt Active Problem List Diagnosis:    Patient Active Problem List   Diagnosis     Postoperative malabsorption     Hx of gastric bypass     Obesity, Class III, BMI 40-49.9 (morbid obesity) (H)       Estimated RMR ([place]-St Jeor equation): 1860 calories  Protein requirements (.5grams to .9grams per pound IBW, 20-30% of calories, minimum of 60-80gm per day):  55-99 grams    Progress made since last visit: patient feels like she is in control, not drinking with meals, limited snacking, gave up bread for lent  Goal progress:  1. Keep a food diary- goal not met- patient succeeded for the first week and then stopped  2. Get on elliptical 3 times per week- goal met- patient is walking 3-4 times per week, doing a loop and if weather is not good, will get on the elliptical in inclement weather  Concerns: inconsistent meals and lack of balance in food    Diet Recall/Time:   Breakfast: 2 eggs and some sort of meat, yogurt (30g)  Am Snack: none  Lunch: sandwich with meat (20g)  Pm snack:none  Dinner: pasta with broth based carbonara- peas and veggies (5g)  HS Snack: none    Protein: 55g    Typical Snacks: none  Meals per week away from home: maybe once weekly- rare     Meal Duration: 10-15min    Portion Sizes problematic? NO per patient/diet recall    Protein, vegetables/fruits, carbohydrates: We spent time today discussing balance in meals, I recommended focusing on just the  dinner meal, taking small steps. We also spent time discussing meal ideas when on the road.  The patient and I discussed the importance of including lean/low fat protein at each meal and limiting carbohydrate intake to less than 25% of plate volume.     Beverages (Type/Oz. per day)  Water: 64+oz   Coffee: sometimes up to 2 cups- with protein shake instead of creamer    Discussed the importance of adequate hydration and the goal of 64+ oz of fluid daily.   The patient understands the importance of  avoiding all sweetened and alcoholic drinks, and instead choosing 64 oz plain water.    Exercise  Walking 3-4 times per week    Pt's understands that 45-60 minutes of daily activity is an important part of weight loss success.   Encouraged pt to incorporate  strength training exercise in addition to cardiovascular exercise most days of the week.    PES statement:     1. (NB-1.7) Undesirable food choices related to Failure to adjust for lifestyle changes as evidenced by low protein intake at meals; large portions; skipping meals; frequent grazing;  mindless eating ; drinking with meals, not chewing each bite to applesauce consistency; consuming caffeine/carbonation daily, frequent restaurant intake; and no structured activity regimen     Intervention:  Discussion:  Reviewed the plate method and spent time brainstorming ideas to add variety to meal times that incorporate all the food groups. We also spent time on ideas for eating on the go instead of fast food.  Instructions/Goals:   1. Track food- try out a meal tracking alem  2. [place] with new recipes for foods such as homemade granola bars and dried fruit.  3. Focus on dinner meal, making it look like plate method.    Handouts Provided:  Food Label    Monitor/Evaluation:    Pt will f/u in one month with MANJU and 2 months with Dr. Chand.    Plan for next visit with RD:  Review plate method   Exercise      Time In: 10:30a  Time Out: 11:00a      ABN signed: Yes

## 2021-06-02 VITALS — WEIGHT: 276.2 LBS | BODY MASS INDEX: 48.94 KG/M2 | HEIGHT: 63 IN

## 2021-06-02 VITALS — WEIGHT: 271 LBS | BODY MASS INDEX: 48.02 KG/M2 | HEIGHT: 63 IN

## 2021-06-02 VITALS — BODY MASS INDEX: 48.71 KG/M2 | WEIGHT: 274.9 LBS | HEIGHT: 63 IN

## 2021-06-03 VITALS — WEIGHT: 269.4 LBS | BODY MASS INDEX: 47.73 KG/M2 | HEIGHT: 63 IN

## 2021-06-03 VITALS — BODY MASS INDEX: 47.86 KG/M2 | WEIGHT: 270.1 LBS | HEIGHT: 63 IN

## 2021-06-22 NOTE — PROGRESS NOTES
Bariatric Care Clinic Follow Up Visit for Previous Bariatric Surgery   Date of visit: 12/27/2018  Physician: Shon Chand MD  Primary Care is Devante Painting MD.  Israel Zabala   38 y.o.  female    Date of Surgery: 2003  Initial Weight: 284 lbs  Initial BMI: na  Today's Weight:   Wt Readings from Last 1 Encounters:   12/27/18 (!) 276 lb 3.2 oz (125.3 kg)     Body mass index is 49.32 kg/m .  Initial Weight: 276.2 lbs  Weight: (!) 276 lb 3.2 oz (125.3 kg)  Weight loss from initial: 0  % Weight loss: 0 %       Assessment and Plan   Assessment: Israel is a 38 y.o. year old female who is 15 years s/p  Dung en Y Gastric Bypass at [facility] with Dr. Dey.  She has had a durable weight loss of 8  lbs since surgery.  Overall compliance with the [facility] Surgery Program has been newly established today as her original surgery/follow up was outside of our program.  She did well for many years but around the time that her special needs child was born in 2012 and then again following her carcinoid resection from her lung in 2013 has had steady weight gain from her steady weight around 220 lbs, to near her preoperative levels.      She has a lot of grazing/mindless eating but still does endorse some restriction. We'll check an upper GI for evaluation of her previous surgery.     She smoked intermittently for many years following her gastric bypass but did successfully quit around the time of her pulmonary bilobectomy for her carcinoid. We discussed the importance of avoiding nicotine going forward given it's ulcerative effects on her pouch.    Her vitamin supplementation is less than ideal, we'll check her labs and improve her deficiencies.     Her exercise regimen is non existent currently but she has access to a home elliptical and is planning to adapt a couch to 5k plan to the elliptical..  .  Israel Zabala feels that she has not achieved her   preoperative goals for bariatric  surgery.    Plan:  1. Welcome to the [facility]. Read through your packet.  2. Vitamin labs can be done today. I'll message with results and correct deficiencies as needed. I'd recommend switching to calcium citrate, increasing your multivitamin to TWICE daily and making sure it has at least 18mg of iron per serving (or consider a prenatal vitamin).   3. Get back on the elliptical and consider starting the couch to 5k program.  4. Referral to dietician.  5. Recheck with me towards the end of February to review how things are going.  6. Upper GI xray can be done to look at structure of your previous surgery.  7 weight gain: would be a candidate for medication to support diet change/weight loss but she wishes to first lay a dietary foundation before considering medication which seems reasonable. We'll meet up again in February to assess how things are going and adjust plan/add medication as needed.    1. Postoperative malabsorption  HM2(CBC w/o Differential)    Comprehensive Metabolic Panel    Vitamin D, Total (25-Hydroxy)    Parathyroid Hormone Intact    Thyroid Stimulating Hormone (TSH)    Vitamin A (Retinol), Serum or Plasma    Vitamin B12    Vitamin B1 (Thiamine), Whole Blood (VIT B1 WB)    Ferritin    Copper, Serum or Plasma    Zinc, Serum or Plasma    Folate, Serum    Glycosylated Hemoglobin A1c    XR Upper GI W Digital Recording   2. Hx of gastric bypass  Amb Referral to Bariatric Dietician   3. Weight gain following gastric bypass surgery  Amb Referral to Bariatric Dietician    XR Upper GI W Digital Recording   4. Obesity, Class III, BMI 40-49.9 (morbid obesity) (H)  Amb Referral to Bariatric Dietician       No Follow-up on file.     Bariatric Surgery Review   Interim History/LifeChanges: 2012 had daughter with some special needs and increased care requirements, 2013 had bilobectomy of right lung for carcinoid and after having been stable around 220 lbs for many years after her gastric  bypass has been gaining back most of her weight initially lost.  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    Patient Concerns: weight regain and getting back on track.    Medication changes: none.    Appetite (1-10): has mindless eating/grazing. Finds herself distracted frequently. Has lost some enjoyment in eating.  GERD sx at all? no    Vitamin Intake:   Multivitamin   once daily [employer] brand   Vitamin D  just the amount in her calcium.   Calcium  yes once daily, carbonate/D   Vit. B-12    thinks 1000mcg SL.     Habits:            Alcohol Intake  occasionally, may have red wine.   NSAID Use  occasionally.   Caffeine Use  yes: 1-2 cups daily (cream).    Exercise  nothing currently. Has an elliptical at home.   CPAP Use:  no   Birth Control  none.                                            LABS: ordered      LABS:  No results found for: WBC, HGB, HCT, MCV, PLT   No results found for: [id number] No results found for: HGBA1C   No results found for: CHOL No results found for: PTH      No results found for: FERRITIN   No results found for: HDL   No results found for: [id number] No results found for: 63032   No results found for: LDLCALC No results found for: TSH No results found for: FOLATE   No results found for: TRIG No results found for: ALT, AST, GGT, ALKPHOS, BILITOT No results found for: TESTOSTERONE     No components found for: CHOLHDL No results found for: 7597   @gokit(vitamin a: 1)@          Patient Profile   Social History     Social History Narrative     Not on file        Past Medical History   Past Medical History:   Diagnosis Date     Cancer (H)     right lung carcinoid 2013 s/p bilobectomy at [facility].     History of prediabetes     resolved after 2003 gastric bypass.     There is no problem list on file for this patient.    Current Outpatient Medications   Medication Sig     calcium carb/vit D3/minerals (CALCIUM-VITAMIN D ORAL) Take 1 tablet by mouth.      "cyanocobalamin, vitamin B-12, (VITAMIN B-12 ORAL) Take by mouth.     ferrous sulfate 325 (65 FE) MG tablet Take 325 mg by mouth.     multivitamin with minerals tablet Take by mouth.       Past Surgical History  She has a past surgical history that includes Dung-en-y procedure; Hernia repair; and Cholecystectomy.     Examination   /78 (Patient Site: Right Arm, Patient Position: Sitting, Cuff Size: Adult Large)   Pulse 76   Ht 5' 2.75\" (1.594 m)   Wt (!) 276 lb 3.2 oz (125.3 kg)   LMP  (Within Weeks) Comment: About a month ago  SpO2 99%   Breastfeeding? No   BMI 49.32 kg/m    Height: 5' 2.75\" (1.594 m) (12/27/2018  9:22 AM)  Initial Weight: 276.2 lbs (12/27/2018  9:22 AM)  Weight: (!) 276 lb 3.2 oz (125.3 kg) (12/27/2018  9:22 AM)  Weight loss from initial: 0 (12/27/2018  9:22 AM)  % Weight loss: 0 % (12/27/2018  9:22 AM)  BMI (Calculated): 49.3 (12/27/2018  9:22 AM)  SpO2: 99 % (12/27/2018  9:22 AM)  Waist Circumference (In): 58.5 Inches (12/27/2018  9:22 AM)  Hip Circumference (In): 61 Inches (12/27/2018  9:22 AM)  Neck Circumference (In): 15 Inches (12/27/2018  9:22 AM)    General:  Alert and ambulatory,   HEENT:  No conjunctival pallor, moist mucous Membranes, neck is thick. No bruit  Pulmonary:  Normal respiratory effort, no cough, no audible wheezes/crackles. Bilateral breath sounds. Larger AP diameter/barrel chest physique.  CV:  Regular rate and Rhythm, no murmurs, pulses 2 plus  Abdominal: midline incision is well healed. Non tender.   Extremities: no edema or tremor  Skin:  No pallor or jaundice   Pscyh/Mood: pleasant, smiling and motivated for change.         Counseling:   We reviewed the important post op bariatric recommendations:  -eating 3 meals daily  -eating protein first, getting >60gm protein daily  -eating slowly, chewing food well  -avoiding/limiting calorie containing beverages  -drinking water 15-30 minutes before or after meals  -limiting restaurant or cafeteria eating to twice a " week or less    We discussed the importance of restorative sleep and stress management in maintaining a healthy weight.  We discussed the National Weight Control Registry healthy weight maintenance strategies and ways to optimize metabolism.  We discussed the importance of physical activity including cardiovascular and strength training in maintaining a healthier weight.  We discussed the importance of life-long vitamin supplementation and life-long  follow-up.    Israel was reminded that, to avoid marginal ulcers she should avoid tobacco at all, alcohol in excess, caffeine in excess, and NSAIDS (unless indicated for cardioprotection or othewise and opposed by a PPI).    At least 60 minutes was spent in direct consultation and over 50% of the time devoted to counseling regarding maximizing the benefits of her previous bariatric surgery while minimizing risks of nutritional or structural complications.    Shon Chand MD  [facility].  12/27/2018  9:56 AM

## 2021-06-24 NOTE — PATIENT INSTRUCTIONS - HE
Plan:  1. Great work with initial weight loss. To optimize your tools:  Continue good bariatric method and vitamin use and from an exercise standpoint, goal to increase elliptical to your favorite 4 songs.    2. Lomaira refilled with one additional refill as well. Consider taking 1-2 hours after either breakfast or lunch.  Continue metformin with plan to recheck A1c this Summer.    3. Consider protein shake w/ some small veggie salad/side for lunch if struggling to find a meal.      [facility]  Nutritional Guidelines  Gastric Bypass 18 Months Post Op and Beyond    General Guidelines and Helpful Hints:    Eat 3 meals per day + protein supplement(s). No snacks between meals.  o Do not skip meals.  This can cause overeating at the next meal and will prevent adequate protein and nutritional intake.    Aim for 60-80 grams of protein per day.  o Always eat your protein first. This assists with optimal nutrition and helps you stay full longer.  o Depending on your portion size, you may need to drink approved protein supplement between meals to achieve protein goals. Follow recommendations of your Dietitian.     Eat your protein first, and then follow with fiber.   o It is not necessary to count your fiber, but 15-20 grams per day is recommended.    o Add fiber by including fruits, vegetables, whole grains, and beans.     Portions should remain about 1 cup per meal. Use measuring cups to be accurate.    Continue to use saucer/salad plates, infant/toddler silverware to keep portion sizes small and take small bites.    Eat S-L-O-W-L-Y to make each meal last 20-30 minutes. Always stop eating when satisfied.    Continue to use caution with foods containing skins, peels or membranes. Chew well!    Aim for 64 oz. of calorie-free fluids daily.  o Continue to avoid caffeine and carbonation. If you choose to drink alcohol, do so in moderation.   o Remember to avoid drinking during meals, 15-30 minutes before and 30  minutes after.    Exercise is gonzalez for continued weight loss and weight maintenance. Aim for 30-60 minutes of physical activity most days of the week. Include cardiovascular and strength training.    If having trouble tolerating meat, try using a crock-pot, tinfoil tent, steamer or other moist cooking method to create tender meats. Add broth or low-fat gravy to help meat stay moist.     Avoid high sugar and high fat foods to prevent dumping syndrome.  o Check nutrition labels for less than 10 grams of sugar and less than 10 grams of fat per serving.    Continue Taking Vitamins/Minerals:  o 1729-7136 mcg of Sublingual B-12 daily  o 1 Multivitamin with Iron twice daily (chewable or swallow tabs)  o 500-600 mg Calcium Citrate twice daily (chewable or swallow tabs)  o 5000 IU Vitamin D3 daily    Sample Grocery List    Protein:    Fat free Greek or light yogurt (less than 10 grams sugar)    Fat free or low-fat cottage cheese    String cheese or reduced fat cheese slices    Tuna, salmon, crab, egg, or chicken salad made with light or fat free mayonnaise    Egg or Egg Substitute    Lean/extra lean turkey, beef, bison, venison (ground, sirloin, round, flank)    Pork loin or tenderloin (grilled, baked, broiled)    Fish such as salmon, tuna, trout, tilapia, etc. (grilled, baked, broiled)    Tender cuts of lean (skinless) turkey or chicken    Lean deli meats: turkey, lean ham, chicken, lean roast beef    Beans such as kidney, garbanzo, black, landeros, or low-fat/fat free refried beans    Peanut butter (natural preferred). Limit to 1 Tbsp. per day.    Low-fat meatloaf (made with lean ground beef or turkey)    Sloppy Joes made with low-sugar ketchup and lean ground beef or turkey    Soy or vegetable protein (i.e. vegan crumbles, soy/veggie burger, tofu)    Hummus    Vegetables:    Fresh: cooked or raw (as tolerated)    Frozen vegetables    Canned vegetables (low sodium or no salt added, rinse before cooking/eating)    (Ok to have  skins/peels/membranes/seeds - just chew well)    Fruits:    Fresh fruit    Frozen fruit (no sugar added)    Canned fruit (packed in its own juice, NOT syrup)    (Ok to have skins/peels/membranes/seeds - just chew well)    Starch:    Unsweetened whole-grain hot cereal (or high fiber cold cereal, dry)    Toasted whole wheat bread or [place] Thins    Whole grain crackers    Baked /boiled/mashed potato/sweet potato    Cooked whole grain pasta, brown rice, or other cooked whole grains    Starchy vegetables: corn, peas, winter squash    Protein Supplement:     Ready to drink protein shake with:  o 15-30 grams protein per serving  o Less than 10 grams total carbohydrate per serving     Protein powder mixed with:  o  Skim or 1% milk  o Low fat or fat free Lactaid milk, plain or no sugar added soymilk  o Water     Fats: (use in moderation)    1 teaspoon of soft tub margarine    1 teaspoon olive oil, canola oil, or peanut oil    1 tablespoon of low-fat frank or salad dressing     Sample Menu for 18+ months after Gastric Bypass    You do NOT need to eat/drink the full portion sizes listed below  Always stop when you are satisfied    Breakfast   cup 1% cottage cheese     cup mixed berries   Lunch 2 oz lean roast beef on   [place] Thin with 1 tsp. light frank    small tomato, chopped, mixed with 1 tsp. light vinaigrette dressing   Supplement Approved protein supplement (if needed between meals)   Dinner 2 oz grilled salmon    cup salad greens with 1 tsp. light salad dressing and 1 tsp. ground flax seed    cup quinoa or brown rice     Breakfast   cup egg substitute with   cup sautéed chopped vegetables  2 light [place] Krisp crackers   Lunch Tuna Melt:   cup tuna mixed with 1 tsp. light frank over   [place] Thin. Top with 2-3 slices cucumber and 1 oz slice of low fat cheese   Supplement 1 cup skim milk (if needed between meals)   Dinner 3 oz  grilled, broiled, or baked seasoned skinless chicken breast    cup asparagus     Breakfast    cup plain oatmeal made with skim or 1% milk with 1 Tbsp. flavored/unflavored protein powder added  1 mozzarella string cheese   Lunch 2 oz deli turkey breast  1/3 cup salad with 1 tsp. light salad dressing, 1/8 of a whole avocado and 1 Tbsp. sunflower seeds   Dinner 3 oz. pork loin made in a crock pot, seasoned with a spice rub    cup cooked carrots   Supplement Approved protein supplement (if needed between meals)     Breakfast 1 cup breakfast casserole made with egg substitute, turkey sausage,  and steamed, chopped bell peppers   Supplement  1 cup light Greek yogurt (if needed between meals)   Lunch 2 oz. teriyaki turkey    cup mashed sweet potato with 1-2 spritzes of spray butter (like Parkay)    cup fresh pineapple   Dinner 3 oz low fat meatloaf    cup roasted garlic zucchini     Breakfast   cup leftover breakfast casserole    cup no sugar added applesauce with 1 Tbsp. unflavored protein powder and a sprinkle of cinnamon    Lunch 3 oz shrimp with 1-2 Tbsp. low-sugar cocktail sauce for dipping    c. whole wheat pasta drizzled with   tsp. olive oil   Supplement 1 cup skim/1% milk with scoop of protein powder (if needed between meals)   Dinner Grilled, seasoned kebob with 2 oz lean beef and   cup vegetables     Breakfast Breakfast pizza:   [place] Thin spread with 1 Tbsp. low sugar spaghetti sauce,   cup shredded low fat cheese, melted and 1 slice of [place] harmon     cup fresh fruit mixed with chopped almonds   Lunch   cup black bean soup  4-5 whole grain crackers   Dinner 3 oz  tilapia with lemon pepper seasoning    cup stewed tomatoes   Supplement 1 string cheese (if needed between meals)     Breakfast 2 hard boiled eggs (discard 1 egg yolk)    whole wheat English Muffin with 1 tsp. low sugar jelly   Lunch   cup leftover black bean soup topped with 1-2 Tbsp. low fat cheese  2-3 light R[place] Krisp crackers   Supplement Approved protein supplement (if needed between meals)   Dinner 3 oz sirloin steak    cup steamed  "broccoli           Information about the Weight Loss Medication Phentermine    When combined with a commitment to diet and behavior changes, weight loss medications can be a nice additional tool to maximize your weight loss season.  There are no magic pills and without diet and behavior changes, weight loss will be minimal.  Think of this medication as a tool to make your diet and behavior changes easier and you'll enjoy a higher probability of success.  Remember not to skip meals, but use this medication to tolerate your reduced calories more easily.  If you are very hungry in the evenings, you are likely not eating enough in the first 10 hours of your day and need to focus on getting your protein requirements in at each of your 3 daily meals.      Phentermine is a stimulant medication related to the amphetamine class of medication but with a lower risk of dependence and addiction.  It is used for weight loss by suppressing the appetite region of the brain.  It also may speed up the metabolic rate and give a person more energy.  Like any medication there are potential side effects and the most common are:  Dry mouth occurs in almost everyone (hydrate well), fewer people experience Palpatations, fast heart rate, elevation of blood pressure, restlessness, insomnia, dizziness, change in mood, tremor, headache, changes in bowel movements,itchiness, changes in sex drive.    Some people can develop serious side effects which include:  Heart strain (\"ischemia\").  Tachycardia (fast heart rate or irregular heart rate).  Hypertension  Pulmonary Hypertension  Psychosis  Dependency and abuse has occurred in some.  If you've been on high dose (37.5mg) for long periods, phentermine should be tapered down over a few weeks before abruptly stopping as seizures have been reported rarely.    We do not recommend taking it in combination with the following medications due to potential drug interactions which can increase the risk of " side effects and/or potential for seizures:    Absolutely contraindicated are:  Amphetamines or other stimulants like ADHD medication: (dextroamphetamine, amphetamine, diethylpropion, isocarboxazid, methamphetamine, lisdexamfetamine, benzphetamine,dexmethylphenidate, methylphenidate, selegiline patch, sibutramine, tranylcypromine.    Avoid use with:   Dopamine, dobutamine, ephedra, ephedrine, epinephrine, isoproterenol, linezolid, norepinephrine, phenylephrine injection, venlafaxine (Effexor).    Monitor or modify dose with:  Acebutolol, atenolol, betaxolol, bisoprolol, carvedilol, droxidopa, esmolol, labetalol, magnesium citrate, metoprolol, nadolol, nebivolol, penbutolol, pindolol, propranolol, sotalol, timolol.    Caution with: armodafinil,betaxolol eye drops, brexpiprazole, bupropion, busulfan, caffeine, carteolol drops, enzalutaminde, ginseng, green tea, guarana, levobunolol drops, lindane cream, modafinil, afrin nasal spray (oxymetazoline), pamabrom, phenylephrine oral and nasal spray, pseudoephedrine (sudafed), rasgiline, sleegiline, bowel prep, tiagabine, timolol drops,  TRAMADOL due to increased risk of seizures.    The current cheapest place to fill your prescription is at [facility] [employer] [facility] [employer] or [facility] and is around $30 for 90 tablets.  Occasionally, [facility] and [facility] have price matched, so call around and get the best price for you.  Other pharmacies may charge closer to$70- $100 for the same prescription. You don't have to be a member to use the pharmacy at [facility] currently.  An alternative some patients have tried is using a voucher system through [employer].  $25 paid on their website gets you a  voucher that can allows you to pick your meds up at [facility] without paying anything more.    Dosing:  We start with half a tablet for the first 2 weeks and if tolerating it without problems, you can take up to one full tablet daily in the morning after breakfast.  For those with evening hunger problems,  sometimes half a tablet in the morning and half a tablet around 1-2 pm can be effective, however, risks of nighttime insomnia/restless increase with afternoon dosing so call me at the clinic if considering this regimen or having any issues.  You only have to use the amount effective for you, not to exceed one full tablet.  It can also be used situationaly and does not have to be taken every day. As always, if any questions give us a call at the [facility] telephone:  [phone].          On-the-Go Breakfast Ideas  As of 2015, the latest research shows what a huge impact eating breakfast has on losing weight and feeling your best. People lose more weight when they make breakfast their biggest meal of the day compared to Dinner, but even if you cannot go to that degree, getting a breakfast that has at least 20 grams of protein and even a moderate amount of fat is ideal for maintaining good energy through the day and limits overeating in the evening hours.  The following are some quick and easy suggestions for at least getting something of substance into your body in the morning.  Enjoy!    Eating breakfast within 90 minutes of waking up is an important part of taking care of your body.  After sleeping for hours, your body is in need of fuel.  An ideal breakfast is a combination of protein, whole-grain carbohydrates, or fruit.  Here s why:    -Protein digests very slowly in the body, helping you feel more satisfied.  -Whole grains provide dietary fiber, which also digests slowly and helps keep your gut clean.  -Fruit is a great source of vitamins, minerals, and fiber.      Each one of these breakfast combinations has between 200-300 calories and 15-20 grams of protein.  Feel free to mix and match!    Protein: Choose  -1/2 cup low-fat cottage cheese  -2 hard boiled eggs , or one cooked in olive oil (low/slow heat).  -1 low fat string cheese stick  -1 Tablespoon natural peanut butter  -Roseann  [employer] vegetarian sausage chong (found in freezer section)  -1 slice lowfat cheese  -6 oz 2% or lowfat Greek yogurt, such as Fage or Oikos.    PLUS    Whole Grains:  Choose   -1 whole wheat English muffin  -1 whole wheat elizabet, half  -1/2 Fiber One frozen muffin, thawed  -1/2 Fiber One toaster pastry  -1 whole wheat bagel thin  -1/2 cup Kashi cereal  -1 Kashi waffle (or other whole grain high-fiber waffle)    OR    Fruit: Choose  -1/3 cup blueberries  -1/2 banana (or a plantain- similar to a banana, yet smaller)  -1/2 cup cantaloupe cubes  -1 small apple  -1 small orange  -1/2 cup strawberries    *Adapted from [employer], Fall 20    Ten Breakfasts Under 250 calories    Ideally, getting between 350-600 calories  (depending on starting height and weight)for breakfast is ideal for avoiding hunger later in the day, adjust/add to the following accordingly:    One- 250 calories, 8.5 g protein  1 slice whole-grain toast   1 Tbsp peanut butter    banana    Two- 250 calories, 8 g protein    cup nonfat/lowfat yogurt  1/3rd cup diced no-sugar peaches  1/3rd cup cereal (like Special K, Cheerios, or bran flakes)    Three- 250 calories, 25 g protein  1 egg scrambled with 1 oz skim milk    cup shredded cheddar    whole grain English muffin  1 oz [place] harmon  1 tsp margarine spread    Four- 225 calories, 25 g protein  1/2 cup Kashi Go-Lean cereal    cup skim milk mixed with 1 scoop Bariatric Advantage protein powder    cup no-sugar diced pears    Five- 250 calories, 20 g protein    cup oatmeal prepared with skim milk, 1 scoop protein powder, and sugar-free maple syrup    Six- 200 calories, 5 g protein  1 whole grain waffle, toasted  1 tablespoon creamy peanut or almond butter    Seven-  250 calories, 19 g protein  Breakfast sandwich: 1 slice whole grain toast, cut in half.  Add 1 scrambled egg and one slice cheddar  cheese.    Eight-  250 calories, 15 g protein  2 eggs scrambled with 1/3 cup frozen spinach (heat before adding  to eggs) and 2 tablespoons low fat cream cheese.    Nine-  150 calories, 15 g protein  2/3rd cup cottage cheese    cup cantaloupe    Ten- 200 calories, 20 g protein  Fruit smoothie made with 4 oz. nonfat Greek yogurt,   cup berries, 1 scoop protein powder, and 4 oz skim milk.    Ten Lunches Under 250 Calories    Aim for lunch to be around 300-400 calories a day when trying to lose weight and get that protein in!    One- 200 calories, 11 g protein  1/3 cup tuna salad made with light frank on 1 slice whole grain bread  1 small peeled apple    Two- 250 calories, 16 g protein  1/3 cup lowfat cottage cheese    cup cooked green beans    small fruit cocktail (in natural juice)    Three- 200 calories, 11 g protein    grilled cheese sandwich on whole grain bread with lowfat cheese  2/3rd cup of tomato soup    Four- 250 calories, 22 g protein  Deli wrap: 1 oz sliced turkey, 1 oz sliced ham, 1 oz sliced chicken rolled up with 1 slice low-fat cheese  1 small orange    Five- 250 calories, 28 g protein  2/3rd cup chili with 1 oz shredded cheese  4 saltine crackers    Six- 250 calories, 22 g protein  1 cup fresh spinach with 2 oz chicken, 1/3rd cup mandarin oranges, and 2 tablespoons sliced almonds with 1 tablespoon light vinaigrette dressing    Seven- 200 calories, 11 g protein  1 Tbsp sugar-free preserves and 1 Tbsp peanut butter on 1 slice whole grain toast    cup nonfat/lowfat Greek yogurt    Eight- 250 calories, 18 g protein  1 small soft-shell chicken taco with 1 oz shredded cheese, lettuce, tomato, salsa, and 1 Tbsp light sour cream    cup black beans    Nine- 225 calories, 13 g protein  2 ounces baked chicken  1/4 cup mashed potatoes    cup green beans    Ten- 200 calories, 21 g protein  Deli elizabet: 2 oz roast beef or other deli meat with 1 tsp Richy mayonnaise and sliced tomato, onion, and lettuce  1/3rd cup cottage cheese      Ten Dinners Under 300 calories    If you're eating a large breakfast and medium lunch, keep  dinner small.  300-400 calories is ideal for most people depending on their caloric needs.    One- 300 calories, 12 g protein  1-inch thick slice of turkey meatloaf    cup baked butternut squash    Two- 200 calories, 9 g protein  Bread-less BLT: 3 slices turkey harmon, sliced tomato, wrapped in a large lettuce leaf    cup peeled fruit    Three- 275 calories, 36 g protein  3 oz roasted chicken    cup cooked broccoli    cup shredded cheddar cheese    cup unsweetened applesauce    Four- 200 calories, 25 g protein  3 oz baked tilapia  1/3rd cup cooked carrots    cup yogurt    Five- 250 calories, 20 g protein  Grilled ham  n  Swiss: spread 2 tsp light margarine on 1 slice of whole grain bread.  Cut bread in half, layer 2 oz deli ham with 1 piece of Swiss cheese and grill until cheese is melted.    cup cooked vegetables    Six- 250 calories, 18 g protein  Vegetarian cheeseburger: 1 [place] cheeseburger topped with lettuce, onion, tomato, and ketchup/mustard    cup sweet potato fries    Seven- 250 calories, 18 g protein  Pork pot roast: 2 oz roasted pork loin, 1/3rd cup roasted carrots,   medium potato, cooked with   cup gravy    Eight- 300 calories, 25 g protein  2 oz meatballs (about 2 small meatballs)    cup spaghetti sauce  1/2 piece toast topped with 1 tsp light margarine and topped with garlic powder, toasted in oven    Nine- 250 calories, 16 g protein  Mexican pizza: one 8  corn tortilla topped with 2 oz chicken,   cup salsa, 2 tablespoons black beans, 2 tablespoons shredded cheese.  Bake until cheese is melted.    Ten- 250 calories, 22 g protein  Shrimp stir-taveras: 3 oz cooked shrimp, 1/6th onion,   pepper,   cup chopped carrots sautéed in 1 tablespoon olive oil, topped with 2 tablespoons stir taveras sauce and a pinch of sesame seeds

## 2021-06-24 NOTE — PROGRESS NOTES
"Bariatric Clinic Follow-Up Visit:    Israel Zabala is a 38 y.o.  female with Body mass index is 48.39 kg/m .  presenting here today for follow-up on non-surgical efforts for weight loss. Original Intake visit occurred on 12/27/18 with a weight of 276 lbs.  Along with diet and behavior changes, she has been using lomaira and metformin along with her 2003 RNY gastric bypass done at [facility] w/ Dr. Dey (Preop wt: 284 w/ weight regain due to failure to follow bariatric method/ beverages and grazing throughout the day) to assist her weight loss goals.  See her intake visit notes for details on identified contributors to weight gain in the past.    Weight:   Wt Readings from Last 2 Encounters:   02/27/19 (!) 271 lb (122.9 kg)   02/14/19 (!) 274 lb 14.4 oz (124.7 kg)    pounds  Height: 5' 2.75\" (1.594 m) (2/27/2019  8:56 AM)  Initial Weight: 276.2 lbs (2/14/2019 10:00 AM)  Weight: (!) 271 lb (122.9 kg) (2/27/2019  8:56 AM)  Weight loss from initial: 1.3 (2/14/2019 10:00 AM)  % Weight loss: 0.47 % (2/14/2019 10:00 AM)  BMI (Calculated): 48.4 (2/27/2019  8:56 AM)  SpO2: 99 % (2/27/2019  8:56 AM)  Waist Circumference (In): 58.5 Inches (12/27/2018  9:22 AM)  Hip Circumference (In): 61 Inches (12/27/2018  9:22 AM)  Neck Circumference (In): 15 Inches (12/27/2018  9:22 AM)      Comorbidities:  Patient Active Problem List   Diagnosis     Postoperative malabsorption     Hx of gastric bypass     Obesity, Class III, BMI 40-49.9 (morbid obesity) (H)       Current Outpatient Medications:      calcium carb/vit D3/minerals (CALCIUM-VITAMIN D ORAL), Take 1 tablet by mouth., Disp: , Rfl:      cyanocobalamin, vitamin B-12, (VITAMIN B-12 ORAL), Take by mouth., Disp: , Rfl:      ferrous sulfate 325 (65 FE) MG tablet, Take 325 mg by mouth., Disp: , Rfl:      metFORMIN (GLUCOPHAGE) 500 MG tablet, Start once daily with supper for 2 weeks then increase to twice daily dosing (breakfast and supper)., Disp: 180 tablet, Rfl: 1     " multivitamin with minerals tablet, Take by mouth., Disp: , Rfl:      phentermine 8 mg Tab, One tablet every 6 hours, maximum of three times daily for appetite suppression., Disp: 90 each, Rfl: 1      Interim: Since our last visit, she has lost 5 lbs, is tolerating Lomaira and using once daily. Discussed alternative dosing regimens today. She's worked her endurance up from 2 minutes to 7.5minutes on her home elliptical, 3 days weekly. Met w/ dietician and has lost 3 lbs since that visit 10 days ago. Struggling w/ lunches/hunger in afternoon. Breakfast protein could improve by 4-8g/meal and discussed options.    Plan:   1.  Diet: continue good bariatric method, goal protein 20g/meal minimum. Contemplating shake at lunch if on the go.  2. Exercise: goal of increasing elliptical to 4 songs (12 minutes)  3. Medication: Lomaira refilled  4.  Stress Reduction:  good  5. Goals: 10% goal is under 250 lbs.  6. Borderline diabetes: continue metformin 500mg two times a day, high protein diet and weight loss w/ plan to recheck A1c this Summer.  7. Low D/high PTH. Is back on 5000IUs D3 daily and will recheck levels in the summer.   8. Postoperative malabsorption: using vitamins regularly again for RNY gastric bypass.  Will need to continue lifelong.      We discussed [facility] Bariatric Basics including:  -eating 3 meals daily  -eating protein first  -eating slowly, chewing food well  -avoiding/limiting calorie containing beverages  -We discussed the importance of restorative sleep and stress management in maintaining a healthy weight.  -We discussed the National Weight Control Registry healthy weight maintenance strategies and ways to optimize metabolism.  -We discussed the importance of physical activity including cardiovascular and strength training in maintaining a healthier weight and explored viable options.    Most recent labs:  Lab Results   Component Value Date    WBC 6.8 12/27/2018    HGB 14.4 12/27/2018    HCT 40.7  "12/27/2018    MCV 94 12/27/2018     12/27/2018     No results found for: CHOL  No results found for: HDL  No results found for: LDLCALC  No results found for: TRIG  No components found for: CHOLHDL  Lab Results   Component Value Date    ALT 15 12/27/2018    AST 19 12/27/2018    ALKPHOS 85 12/27/2018    BILITOT 0.2 12/27/2018     Lab Results   Component Value Date    HGBA1C 6.4 (H) 12/27/2018     Lab Results   Component Value Date    DOAKMUHI08 917 (H) 12/27/2018     Lab Results   Component Value Date    BNBWDDDR78JN 25.7 (L) 12/27/2018     Lab Results   Component Value Date    FERRITIN 42 12/27/2018     Lab Results   Component Value Date    PTH 94 (H) 12/27/2018     No results found for: 50076  No results found for: 7597  Lab Results   Component Value Date    TSH 2.03 12/27/2018     No results found for: TESTOSTERONE    DIETARY HISTORY    Positive Changes Since Last Visit: see above  Struggling With: afternoon snacking    Knowledgeable in Reading Food Labels: improving  Getting Adequate Protein: better but still lower than ideal  Sleeping 7-8 hours/day yes, better than usual lately.  Stress management good    PHYSICAL ACTIVITY PATTERNS:  Cardiovascular: elliptical  Strength Training: elliptical    REVIEW OF SYSTEMS  GENERAL/CONSTITUTIONAL:  nl  HEENT:   nl  CARDIOVASCULAR:   no palpitations  PULMONARY:   no dyspnea  GASTROINTESTINAL:  No pain/cramping or rashes  UROLOGIC:  No urinary difficulty  NEUROLOGIC:  No headaches  PSYCHIATRIC:  Stable mood on lomaira  MUSCULOSKELETAL/RHEUMATOLOGIC   no pain/injuires  ENDOCRINE:  Tolerating metformin well.   DERMATOLOGIC:  No rash.    PHYSICAL EXAM:  Vitals: /88 (Patient Site: Right Arm, Patient Position: Sitting, Cuff Size: Adult Large)   Pulse 70   Ht 5' 2.75\" (1.594 m)   Wt (!) 271 lb (122.9 kg)   SpO2 99%   Breastfeeding? No   BMI 48.39 kg/m    Height: 5' 2.75\" (1.594 m) (2/27/2019  8:56 AM)  Initial Weight: 276.2 lbs (2/14/2019 10:00 AM)  Weight: (!) " 271 lb (122.9 kg) (2/27/2019  8:56 AM)  Weight loss from initial: 1.3 (2/14/2019 10:00 AM)  % Weight loss: 0.47 % (2/14/2019 10:00 AM)  BMI (Calculated): 48.4 (2/27/2019  8:56 AM)  SpO2: 99 % (2/27/2019  8:56 AM)  Waist Circumference (In): 58.5 Inches (12/27/2018  9:22 AM)  Hip Circumference (In): 61 Inches (12/27/2018  9:22 AM)  Neck Circumference (In): 15 Inches (12/27/2018  9:22 AM)      GEN: Pleasant, well groomed, in no acute distress  HEENT: PEERL, EOMI, airway clear .  NECK: No swelling.  HEART: Rhythm regular, rate regular, no murmur   LUNGS: Clear without crackles or wheezes. No cough.  ABDOMEN: obese centrally..  EXTREMITIES: 2 plus palpable peripheral pulses, no tremor.  NEURO: Alert and Oriented X3, normal gait and speech.  SKIN: No visible rashes.        25 minutes was spent in direct consultation, with over 50% of it spent in counseling regarding their plan for excess weight reduction and health modification.  Shon Chand MD  [facility]  9:49 AM

## 2021-06-24 NOTE — PROGRESS NOTES
"Post-op Surgical Weight Loss Diet Evaluation     Assessment:  Pt presents for post-op RD visit, s/p RYGN in 2003 with Dr. Dey. Today we reviewed current eating habits and level of physical activity, and instructed on the changes that are required for successful bariatric outcomes.    Patient Progress: Patient has had a lot of twists and turns in life and has gained back most of weight lost since surgery in 2003. She states that she has been snacking frequently and has not been exercising.     Pt's Initial Weight: 276.2 lbs  Weight: (!) 274 lb 14.4 oz (124.7 kg)  Weight loss from initial: 1.3  % Weight loss: 0.47 %      Body mass index is 49.09 kg/m .     Concerns: Frequent snacking, no established physical activity routine, higher intake of refined carbohydrates     Vitamins   Multi Vit with Iron: yes  Calcium Citrate: yes  B12: yes  D3: yes    Patient is taking metformin, and 1 tab of phentermine    Do you experience hunger? Yes in the afternoon  Do you have \"dumping\" syndrome?yes    How often?rare   With what foods: ice cream  Do you experience any reflux or discomfort with eating? none  Nausea: no  Vomiting: no  Diarrhea: no  Constipation: no  Hair loss:no    Diet Recall/Time: wake up at 5:30a  Breakfast: 7:15a- 2 eggs, 1-2 pieces of toast (14g), 1-2 cup of coffee with creamer  Am Snack: yogurt, apple, what is on hand  Lunch: no consistency, leftovers, sandwich- mostly carbohydrates  Pm snack:grazing  Dinner: tacos with ground meat, meatloaf, pork chops, chicken  HS Snack: none    Typical Snacks: yogurt, fruit, grazing throughout the day    Proteins/Veg/Fruits/CHO (NOT well tolerated): ice cream, sweets, doesn't like salmon or raw carrots    Estimated protein intake: 50-60 grams    Estimated portion size per meals:1- 1 1/2 cup/meal    Incorporation of vegetables, fruits, carbohydrates into diet/meals using   Bariatric \"Plate Method\"   The patient and I discussed the importance of including lean/low fat protein " at each meal, including a vegetable/fruit, and limiting carbohydrate intake to less than 25% of plate volume. Always keeping within approved perimeters of post op meal portion sizes according to 3/6/9/12 months post op guidelines.    Meals per week away from home: 1x every other week  Recommended limiting eating out to no more than 2x/week.    Fluid-meal separation:  Fluids are  30min before and 30 minutes after meals.  The patient and I reviewed the anatomy of the bypass and why  fluids from a meal is so important.    Fluid Intake  Water: 4 cups per day  Caffeine: 2 cups per day   Alcohol: rare- social- red wine, beer  Carbonation: sparkling water- let it get flat  Milk: none  Juice: none    Discussed the importance of adequate hydration after surgery and the goal of 64+ oz of fluid daily.   The patient understands the importance of avoiding all carbonated, caffeinated, and sweetened drinks; and instead choosing 64oz plain water.    Exercise  ADL's     Pt's understands that 30-60 minutes of daily activity is an important part of bariatric surgery success.   Encouraged pt to incorporate strength training exercise along with cardiovascular exercise as well, most days of the week.      PES statement:    1. (NI-1.3)Excessive energy intake related to Food and nutrition related knowledge deficit concerning excessive energy/oral intake as evidenced by Intake of high caloric density foods/beverages (juice, soda, alcohol) at meals and/or snacks; large portion; frequent grazing; Estimated intake that exceeds estimated daily energy intake; Binge eating patterns; Frequent excessive fast food or restaurant intake; and BMI 49.09     2. (NC-3.3.5) Obese, class III, BMI ?40 related to physical inactivity as evidenced by Infrequent, low-duration and or low intensity physical activity; and Large amounts of sedentary activities; no structured physical activity regimen     Intervention    Discussion  1. Discussed 18  "months and beyond  Post-Op Nutritional Guidelines for RYGB  2. Recommended to consume 15-20gm protein at 3 meals daily, along with protein supplement/\"planned protein containing snack\" of 15-30gm protein, to reach goal of 60-80 gm protein daily.  3. Educated on post-op vitamin regimen: Multi Vit + iron 2x/day, calcium citrate 400-600 mg 2x/day, 5481-2475 mcg of Sublingual B-12 daily, and 5000 IU Vitamin D3 daily (MVI and calcium can be taken at the same time BID)  4. Reviewed lean protein sources  5. Bariatric Plate Method-  including lean/low fat protein at each meal, including a vegetable/fruit, and limiting carbohydrate intake to less than 25% of plate volume. Approved perimeters of post op meal portion sizes according to post op guidelines.  Instructions  1. Include 15-20gm protein at each meal, along with protein supplement/\"planned protein containing snack\" of 15-30gm protein, to reach goal of 60-80 gm protein daily.  2. Increase fluid intake to 64oz daily: choose plain or calorie/carbonation-free beverages.  3. Incorporate daily structured activity, 30-60 minutes most days of the week  4. Read food labels more consistently: keeping total fat grams <10, total sugar grams <10, fiber >3gm per serving.  5. Increase vegetable/fruit intake, by having a vegetable or fruit with each meal daily.  6. Practice plate method: 1/2 plate lean/low fat protein source, vegetable/fruit, <25% of plate complex carbohydrates.  7. Separate fluids 30 minutes before/after meal times.  8. Practice eating off of smaller plates/bowls, chewing to applesauce consistency, taking 20-30 minutes to eat in a calm/relaxed environment without distractions of tv/email/cell phone.    Patient Goals:  1. Track food with daily food diary  2. Exercise by getting on the elliptical 3 times per week.    Handouts provided:  18 months and beyond Post-Op Nutritional Guidelines for RYGB  Bariatric Plate    Monitor/Evaluation    Pt to follow up in 2 weeks " with bariatrician and 6 weeks with RD      Time In: 10:30a  Time Out: 11:05a      ABN signed: Yes

## 2021-09-05 ENCOUNTER — HEALTH MAINTENANCE LETTER (OUTPATIENT)
Age: 41
End: 2021-09-05

## 2021-11-21 ENCOUNTER — OFFICE VISIT (OUTPATIENT)
Dept: URGENT CARE | Facility: URGENT CARE | Age: 41
End: 2021-11-21
Payer: COMMERCIAL

## 2021-11-21 VITALS
RESPIRATION RATE: 16 BRPM | DIASTOLIC BLOOD PRESSURE: 90 MMHG | OXYGEN SATURATION: 100 % | TEMPERATURE: 98.2 F | SYSTOLIC BLOOD PRESSURE: 132 MMHG | HEART RATE: 73 BPM

## 2021-11-21 DIAGNOSIS — R21 RASH: Primary | ICD-10-CM

## 2021-11-21 DIAGNOSIS — B37.31 YEAST VAGINITIS: ICD-10-CM

## 2021-11-21 DIAGNOSIS — B08.4 HAND, FOOT AND MOUTH DISEASE: ICD-10-CM

## 2021-11-21 LAB
DEPRECATED S PYO AG THROAT QL EIA: NEGATIVE
GROUP A STREP BY PCR: NOT DETECTED

## 2021-11-21 PROCEDURE — 87651 STREP A DNA AMP PROBE: CPT | Performed by: EMERGENCY MEDICINE

## 2021-11-21 PROCEDURE — 99213 OFFICE O/P EST LOW 20 MIN: CPT | Performed by: EMERGENCY MEDICINE

## 2021-11-21 RX ORDER — FLUCONAZOLE 150 MG/1
150 TABLET ORAL ONCE
Qty: 2 TABLET | Refills: 0 | Status: SHIPPED | OUTPATIENT
Start: 2021-11-21 | End: 2021-11-21

## 2021-11-21 NOTE — PATIENT INSTRUCTIONS
Diflucan as instructed    Contagious for 1 week    Recheck if any worsening rash or concerns  Patient Education     Hand, Foot, and Mouth Disease (Child)    Hand, foot, and mouth disease (HFMD) is an illness caused by a virus. It's usually seen in young children. This virus causes small sores in the throat, lips, cheeks, gums, and tongue. Small blisters or red spots may also appear on the palms (hands), diaper area, and soles of the feet. The child usually has a low-grade fever and poor appetite. HFMD is not a serious illness and usually goes away in 1 to 2 weeks. The painful sores in the mouth may prevent your child from eating and drinking.   It takes 3 to 5 days for the illness to appear in an exposed child. Generally, HFMD is most contagious during the first week of the illness. Sometimes children can be contagious for days or weeks after the symptoms have disappeared.   HFMD can be passed from person to person by:   Touching your nose, mouth, or eye after touching the stool of a person who has the virus  Touching your nose, mouth, or eye after touching fluid from the blisters or sores of an infected person  Respiratory droplets from sneezing, coughing, or blowing your nose  Touching contaminated objects such as toys or doorknobs  Oral droplets from kissing  To prevent the spread of the virus, be sure to wash your hands with soap and water for at least 20 seconds. Or use an alcohol-based hand  if no soap and water are available. Always wash your hand before and after taking care of someone who is sick, before, during, and after preparing food, before eating, after using the toilet, after changing diapers, after sneezing or coughing, and after blowing your nose. Help children to learn how to correctly wash their hands.   Home care  Mouth pain  Unless your child's healthcare provider has prescribed another medicine for mouth pain:   You can give acetaminophen or ibuprofen to ease pain, discomfort, or  fever. But talk with the provider before giving your child either of these medicines. Ask about how much to give and how often. Don't give ibuprofen to a baby 6 months of age or younger. Also talk with your child's provider before giving these medicines if your child has chronic liver or kidney disease or ever had a stomach ulcer or gastrointestinal bleeding. Never give aspirin to anyone under 18 years of age who has a fever. It may cause Reye syndrome or death.  You can give liquid rinses to a child older than 12 months of age. Ask your child's healthcare provider for instructions.  Feeding  Follow a soft diet with plenty of fluids to prevent dehydration. If your child doesn't want to eat solid foods, it's OK for a few days, as long as they drink lots of fluid. Cool drinks and frozen treats such as sherbet are soothing and easier to take. Don't give your child citrus juices such as orange juice or lemonade, or salty or spicy foods. These may cause more pain in the mouth sores.   Return to  or school  Children may usually return to  or school once the fever is gone and they are eating and drinking well. Contact your healthcare provider and ask when your child is able to return to  or school.   Follow up  Follow up with your child's healthcare provider, or as advised.  When to seek medical advice  Call your child's healthcare provider right away if any of these occur:  Your child complains of pain in the back of the neck, or is difficult to arouse  Your child has a severe headache or continued vomiting  Your child is having trouble breathing  Your child is drowsy or has trouble staying awake  Your child is having trouble swallowing  Your child still has mouth sores after 2 weeks  Your child's symptoms are getting worse  Your child appears to be dehydrated. Signs are dry mouth, no tears, and no pee 8 or more hours.  Your child has a fever (see Fever and children, below)  Call 911  Call 911 if any  of these occur:   Unusual fussiness, drowsiness, or confusion  Severe headache or vomiting that continues  Trouble breathing  Seizures  Fever and children  Use a digital thermometer to check your child s temperature. Don t use a mercury thermometer. There are different kinds and uses of digital thermometers. They include:   Rectal. For children younger than 3 years, a rectal temperature is the most accurate.  Forehead (temporal). This works for children age 3 months and older. If a child under 3 months old has signs of illness, this can be used for a first pass. The provider may want to confirm with a rectal temperature.  Ear (tympanic). Ear temperatures are accurate after 6 months of age, but not before.  Armpit (axillary). This is the least reliable but may be used for a first pass to check a child of any age with signs of illness. The provider may want to confirm with a rectal temperature.  Mouth (oral). Don t use a thermometer in your child s mouth until he or she is at least 4 years old.  Use the rectal thermometer with care. Follow the product maker s directions for correct use. Insert it gently. Label it and make sure it s not used in the mouth. It may pass on germs from the stool. If you don t feel OK using a rectal thermometer, ask the healthcare provider what type to use instead. When you talk with any healthcare provider about your child s fever, tell him or her which type you used.   Below are guidelines to know if your young child has a fever. Your child s healthcare provider may give you different numbers for your child. Follow your provider s specific instructions.   Fever readings for a baby under 3 months old:   First, ask your child s healthcare provider how you should take the temperature.  Rectal or forehead: 100.4 F (38 C) or higher  Armpit: 99 F (37.2 C) or higher  Fever readings for a child age 3 months to 36 months (3 years):   Rectal, forehead, or ear: 102 F (38.9 C) or higher  Armpit: 101 F  (38.3 C) or higher  Call the healthcare provider in these cases:   Repeated temperature of 104 F (40 C) or higher in a child of any age  Fever of 100.4  F (38  C) or higher in baby younger than 3 months  Fever that lasts more than 24 hours in a child under age 2  Fever that lasts for 3 days in a child age 2 or older  [employer] last reviewed this educational content on 7/1/2020 2000-2021 [employer]. All rights reserved. This information is not intended as a substitute for professional medical care. Always follow your healthcare professional's instructions.

## 2021-11-21 NOTE — PROGRESS NOTES
CHIEF COMPLAINT: Rash.      HPI: Patient is a 41-year-old female who presents with a 2-day history of painful lesions noted on her hands and wrist region.  Her feet hurt in several fashion.  She wonders whether she may have hand-foot-and-mouth disease.  No chronic dermatitides.  No other areas of rash.  No noted oral lesions but she does have a little bit of a sore mouth.      ROS: See HPI otherwise normal.    No Known Allergies   Current Outpatient Medications   Medication Sig Dispense Refill     Cholecalciferol (VITAMIN D) 2000 UNITS tablet Take 1 tablet by mouth daily       ferrous sulfate (IRON) 325 (65 FE) MG tablet Take 1 tablet (325 mg) by mouth 2 times daily 60 tablet 2     fluconazole (DIFLUCAN) 150 MG tablet Take 1 tablet (150 mg) by mouth once for 1 dose 2 tablet 0     Multiple Vitamins-Minerals (MULTIVITAMIN PO)        VITAMIN B-12 PO 1 TABLET ORALLY DAILY(HOME MED)       fluticasone (FLONASE) 50 MCG/ACT nasal spray Spray 1 spray in nostril daily (Patient not taking: Reported on 12/18/2020) 16 g 0     metFORMIN (GLUCOPHAGE-XR) 500 MG 24 hr tablet Take 1 tablet (500 mg) by mouth 2 times daily (with meals) (Patient not taking: Reported on 12/18/2020) 180 tablet 3     phentermine (ADIPEX-P) 15 MG capsule Take 1 capsule (15 mg) by mouth every morning (Patient not taking: Reported on 12/18/2020) 90 capsule 1     Phentermine HCl (LOMAIRA) 8 MG TABS Take by mouth daily (Patient not taking: Reported on 11/21/2021)       topiramate (TOPAMAX) 50 MG tablet Take 1 tablet (50 mg) by mouth 2 times daily (Patient not taking: Reported on 12/18/2020) 180 tablet 1         PE: No acute distress.  Afebrile.  Examination of her skin reveals some punctate maculopapules seen on her wrists and hands consistent with hand-foot-and-mouth disease.  Feet exam reveals a rare scattered pattern similar lesions.  Oropharyngeal exam reveals no lesions.        TREATMENT: None.      ASSESSMENT: Hand-foot-and-mouth disease.      DIAGNOSIS:  Hand-foot-and-mouth disease      PLAN: Symptomatic and instructions discussed the information given.  Patient also requested Diflucan for yeast infection which I did order.

## 2021-12-06 ENCOUNTER — IMMUNIZATION (OUTPATIENT)
Dept: FAMILY MEDICINE | Facility: CLINIC | Age: 41
End: 2021-12-06
Payer: COMMERCIAL

## 2021-12-06 DIAGNOSIS — Z23 HIGH PRIORITY FOR 2019-NCOV VACCINE: Primary | ICD-10-CM

## 2021-12-06 PROCEDURE — 91300 COVID-19,PF,PFIZER (12+ YRS): CPT

## 2021-12-06 PROCEDURE — 0004A COVID-19,PF,PFIZER (12+ YRS): CPT

## 2021-12-06 PROCEDURE — 99207 PR NO CHARGE LOS: CPT

## 2022-01-11 ENCOUNTER — OFFICE VISIT (OUTPATIENT)
Dept: FAMILY MEDICINE | Facility: CLINIC | Age: 42
End: 2022-01-11
Payer: COMMERCIAL

## 2022-01-11 VITALS
TEMPERATURE: 98.4 F | WEIGHT: 277 LBS | BODY MASS INDEX: 50.97 KG/M2 | HEIGHT: 62 IN | RESPIRATION RATE: 16 BRPM | DIASTOLIC BLOOD PRESSURE: 82 MMHG | SYSTOLIC BLOOD PRESSURE: 142 MMHG | HEART RATE: 80 BPM

## 2022-01-11 DIAGNOSIS — E11.9 TYPE 2 DIABETES MELLITUS WITHOUT COMPLICATION, WITHOUT LONG-TERM CURRENT USE OF INSULIN (H): ICD-10-CM

## 2022-01-11 DIAGNOSIS — Z11.59 NEED FOR HEPATITIS C SCREENING TEST: ICD-10-CM

## 2022-01-11 DIAGNOSIS — C7A.00 MALIGNANT CARCINOID TUMOR, UNSPECIFIED SITE (H): ICD-10-CM

## 2022-01-11 DIAGNOSIS — E66.01 OBESITY, CLASS III, BMI 40-49.9 (MORBID OBESITY) (H): ICD-10-CM

## 2022-01-11 DIAGNOSIS — E78.5 HYPERLIPIDEMIA LDL GOAL <70: ICD-10-CM

## 2022-01-11 DIAGNOSIS — E21.1 SECONDARY HYPERPARATHYROIDISM, NON-RENAL (H): ICD-10-CM

## 2022-01-11 DIAGNOSIS — Z00.00 ROUTINE GENERAL MEDICAL EXAMINATION AT A HEALTH CARE FACILITY: Primary | ICD-10-CM

## 2022-01-11 DIAGNOSIS — Z13.220 SCREENING FOR HYPERLIPIDEMIA: ICD-10-CM

## 2022-01-11 DIAGNOSIS — D64.9 ANEMIA, UNSPECIFIED TYPE: ICD-10-CM

## 2022-01-11 LAB
ALBUMIN SERPL-MCNC: 3.7 G/DL (ref 3.4–5)
ALP SERPL-CCNC: 104 U/L (ref 40–150)
ALT SERPL W P-5'-P-CCNC: 30 U/L (ref 0–50)
ANION GAP SERPL CALCULATED.3IONS-SCNC: 4 MMOL/L (ref 3–14)
AST SERPL W P-5'-P-CCNC: 20 U/L (ref 0–45)
BILIRUB SERPL-MCNC: 0.3 MG/DL (ref 0.2–1.3)
BUN SERPL-MCNC: 16 MG/DL (ref 7–30)
CALCIUM SERPL-MCNC: 9.1 MG/DL (ref 8.5–10.1)
CHLORIDE BLD-SCNC: 104 MMOL/L (ref 94–109)
CHOLEST SERPL-MCNC: 262 MG/DL
CO2 SERPL-SCNC: 29 MMOL/L (ref 20–32)
CREAT SERPL-MCNC: 0.62 MG/DL (ref 0.52–1.04)
ERYTHROCYTE [DISTWIDTH] IN BLOOD BY AUTOMATED COUNT: 14.6 % (ref 10–15)
FASTING STATUS PATIENT QL REPORTED: NO
FERRITIN SERPL-MCNC: 9 NG/ML (ref 12–150)
GFR SERPL CREATININE-BSD FRML MDRD: >90 ML/MIN/1.73M2
GLUCOSE BLD-MCNC: 134 MG/DL (ref 70–99)
HBA1C MFR BLD: 6.6 % (ref 0–5.6)
HCT VFR BLD AUTO: 41 % (ref 35–47)
HCV AB SERPL QL IA: NONREACTIVE
HDLC SERPL-MCNC: 63 MG/DL
HGB BLD-MCNC: 13.6 G/DL (ref 11.7–15.7)
IRON SATN MFR SERPL: 16 % (ref 15–46)
IRON SERPL-MCNC: 77 UG/DL (ref 35–180)
LDLC SERPL CALC-MCNC: 161 MG/DL
MCH RBC QN AUTO: 30.9 PG (ref 26.5–33)
MCHC RBC AUTO-ENTMCNC: 33.2 G/DL (ref 31.5–36.5)
MCV RBC AUTO: 93 FL (ref 78–100)
NONHDLC SERPL-MCNC: 199 MG/DL
PLATELET # BLD AUTO: 282 10E3/UL (ref 150–450)
POTASSIUM BLD-SCNC: 3.7 MMOL/L (ref 3.4–5.3)
PROT SERPL-MCNC: 7.6 G/DL (ref 6.8–8.8)
RBC # BLD AUTO: 4.4 10E6/UL (ref 3.8–5.2)
SODIUM SERPL-SCNC: 137 MMOL/L (ref 133–144)
TIBC SERPL-MCNC: 490 UG/DL (ref 240–430)
TRIGL SERPL-MCNC: 191 MG/DL
WBC # BLD AUTO: 9.2 10E3/UL (ref 4–11)

## 2022-01-11 PROCEDURE — 87624 HPV HI-RISK TYP POOLED RSLT: CPT | Performed by: NURSE PRACTITIONER

## 2022-01-11 PROCEDURE — 86803 HEPATITIS C AB TEST: CPT | Performed by: NURSE PRACTITIONER

## 2022-01-11 PROCEDURE — 80061 LIPID PANEL: CPT | Performed by: NURSE PRACTITIONER

## 2022-01-11 PROCEDURE — 83550 IRON BINDING TEST: CPT | Performed by: NURSE PRACTITIONER

## 2022-01-11 PROCEDURE — 99396 PREV VISIT EST AGE 40-64: CPT | Performed by: NURSE PRACTITIONER

## 2022-01-11 PROCEDURE — 82728 ASSAY OF FERRITIN: CPT | Performed by: NURSE PRACTITIONER

## 2022-01-11 PROCEDURE — G0145 SCR C/V CYTO,THINLAYER,RESCR: HCPCS | Performed by: NURSE PRACTITIONER

## 2022-01-11 PROCEDURE — 85027 COMPLETE CBC AUTOMATED: CPT | Performed by: NURSE PRACTITIONER

## 2022-01-11 PROCEDURE — 36415 COLL VENOUS BLD VENIPUNCTURE: CPT | Performed by: NURSE PRACTITIONER

## 2022-01-11 PROCEDURE — 99214 OFFICE O/P EST MOD 30 MIN: CPT | Mod: 25 | Performed by: NURSE PRACTITIONER

## 2022-01-11 PROCEDURE — 80053 COMPREHEN METABOLIC PANEL: CPT | Performed by: NURSE PRACTITIONER

## 2022-01-11 PROCEDURE — 83036 HEMOGLOBIN GLYCOSYLATED A1C: CPT | Performed by: NURSE PRACTITIONER

## 2022-01-11 ASSESSMENT — ENCOUNTER SYMPTOMS
ABDOMINAL PAIN: 0
COUGH: 0
PARESTHESIAS: 0
BREAST MASS: 0
JOINT SWELLING: 0
HEMATURIA: 0
WEAKNESS: 0
ARTHRALGIAS: 0
DIARRHEA: 0
HEMATOCHEZIA: 0
SORE THROAT: 0
DIZZINESS: 0
HEADACHES: 0
NAUSEA: 0
FREQUENCY: 0
SHORTNESS OF BREATH: 0
PALPITATIONS: 0
MYALGIAS: 1
HEARTBURN: 0
DYSURIA: 0
CHILLS: 0
CONSTIPATION: 0
NERVOUS/ANXIOUS: 0
EYE PAIN: 0
FEVER: 0

## 2022-01-11 ASSESSMENT — PAIN SCALES - GENERAL: PAINLEVEL: NO PAIN (0)

## 2022-01-11 ASSESSMENT — MIFFLIN-ST. JEOR: SCORE: 1864.71

## 2022-01-11 NOTE — PROGRESS NOTES
SUBJECTIVE:   CC: Israel Zabala is an 41 year old woman who presents for preventive health visit.       Patient has been advised of split billing requirements and indicates understanding: Yes  Healthy Habits:     Getting at least 3 servings of Calcium per day:  Yes    Bi-annual eye exam:  Yes    Dental care twice a year:  Yes    Sleep apnea or symptoms of sleep apnea:  None    Diet:  Regular (no restrictions)    Frequency of exercise:  None    Taking medications regularly:  Yes    Medication side effects:  None    PHQ-2 Total Score: 0    Additional concerns today:  No          Today's PHQ-2 Score:   PHQ-2 (  [employer]) 2022   Q1: Little interest or pleasure in doing things 0   Q2: Feeling down, depressed or hopeless 0   PHQ-2 Score 0   PHQ-2 Total Score (12-17 Years)- Positive if 3 or more points; Administer PHQ-A if positive -   Q1: Little interest or pleasure in doing things Not at all   Q2: Feeling down, depressed or hopeless Not at all   PHQ-2 Score 0       Abuse: Current or Past (Physical, Sexual or Emotional) - No  Do you feel safe in your environment? Yes    Have you ever done Advance Care Planning? (For example, a Health Directive, POLST, or a discussion with a medical provider or your loved ones about your wishes): No, advance care planning information given to patient to review.  Patient declined advance care planning discussion at this time.    Social History     Tobacco Use     Smoking status: Former Smoker     Packs/day: 0.10     Years: 8.00     Pack years: 0.80     Types: Cigarettes     Quit date: 2008     Years since quittin.3     Smokeless tobacco: Never Used   Substance Use Topics     Alcohol use: Yes     Comment: occasional     If you drink alcohol do you typically have >3 drinks per day or >7 drinks per week? No    Alcohol Use 2022   Prescreen: >3 drinks/day or >7 drinks/week? No   Prescreen: >3 drinks/day or >7 drinks/week? -       Reviewed orders with patient.  Reviewed  health maintenance and updated orders accordingly - Yes  BP Readings from Last 3 Encounters:   22 (!) 142/82   21 (!) 132/90   20 (!) 142/86    Wt Readings from Last 3 Encounters:   22 125.6 kg (277 lb)   20 122.9 kg (271 lb)   20 122.5 kg (270 lb)                  Patient Active Problem List   Diagnosis     Obesity, Class III, BMI 40-49.9 (morbid obesity) (H)     CARDIOVASCULAR SCREENING; LDL GOAL LESS THAN 160     Malignant carcinoid tumor (H)     Morbid obesity, unspecified obesity type (H)     Hx of gastric bypass     Excessive bleeding in premenopausal period     White coat syndrome without diagnosis of hypertension     Nipple discharge in female     Postoperative malabsorption     Past Surgical History:   Procedure Laterality Date     BRONCHOSCOPY FLEXIBLE  2013    related to carcinoid;  Flexible Bronchoscopy, Endobronchial Flexible Ultrasound;  Surgeon: Chris Sal MD;  Location: [facility]     CHOLECYSTECTOMY       ENDOBRONCHIAL ULTRASOUND FLEXIBLE  2013    Procedure: ENDOBRONCHIAL ULTRASOUND FLEXIBLE;;  Surgeon: Chris Sal MD;  Location: [facility] OR     GASTRIC BYPASS  2003     HERNIA REPAIR      , supraumbilical     HERNIORRHAPHY INCISIONAL (LOCATION)  2011    Procedure:HERNIORRHAPHY INCISIONAL (LOCATION); Incisional Hernia Repair upper abdomen midline       LUNG LOBECTOMY      for carcinoid, done at [place]     REVISION KATHY-EN-Y       TONSILLECTOMY  age 7     ZZC REMOVAL OF LUNG,BILOBECTOMY Right 2013    Carcinoid: Right Middle and Lower Lobe       Social History     Tobacco Use     Smoking status: Former Smoker     Packs/day: 0.10     Years: 8.00     Pack years: 0.80     Types: Cigarettes     Quit date: 2008     Years since quittin.3     Smokeless tobacco: Never Used   Substance Use Topics     Alcohol use: Yes     Comment: occasional     Family History   Problem Relation Age of Onset     Breast Cancer Maternal  Grandmother 65     Cancer Maternal Grandmother         skin     Diabetes Maternal Grandfather      Heart Disease Paternal Grandmother         MI     Cerebrovascular Disease Paternal Grandmother      Heart Disease Paternal Grandfather         MI     Gastrointestinal Disease Brother         Colitis     Prostate Cancer Father      Cancer Mother         skin     Congenital Anomalies Daughter         imperforate anus     Hypertension Mother      Hyperlipidemia Father          Current Outpatient Medications   Medication Sig Dispense Refill     Cholecalciferol (VITAMIN D) 2000 UNITS tablet Take 1 tablet by mouth daily       ferrous sulfate (IRON) 325 (65 FE) MG tablet Take 1 tablet (325 mg) by mouth 2 times daily 60 tablet 2     fluticasone (FLONASE) 50 MCG/ACT nasal spray Spray 1 spray in nostril daily 16 g 0     Multiple Vitamins-Minerals (MULTIVITAMIN PO)        VITAMIN B-12 PO 1 TABLET ORALLY DAILY(HOME MED)       No Known Allergies  Recent Labs   Lab Test 12/18/20  1020 04/15/19  1026 12/27/18  1045 12/27/18  1045 12/27/18  0000 11/12/18  0842 11/09/16  0811   A1C 6.2*  --  6.4*  --  6.4*  --  5.8   *  --   --   --   --  174* 128*   HDL 67  --   --   --   --  58 56   TRIG 156*  --   --   --   --  168* 156*   ALT  --   --   --  15 15  --  23   CR 0.71  --   --  0.66 0.66  --  0.65   GFRESTIMATED >90  --   --  >60 >60  --  >90  Non  GFR Calc     GFRESTBLACK >90  --   --  >60 >60  --  >90  African American GFR Calc     POTASSIUM 4.3  --   --  4.3 4.3  --  4.2   TSH  --  2.50  --  2.03 2.03  --  1.88        Breast Cancer Screening:    FHS-7:   Breast CA Risk Assessment (FHS-7) 1/11/2022   Did any of your first-degree relatives have breast or ovarian cancer? No   Did any of your relatives have bilateral breast cancer? No   Did any man in your family have breast cancer? No   Did any woman in your family have breast and ovarian cancer? Yes   Did any woman in your family have breast cancer before age 50  y? No   Do you have 2 or more relatives with breast and/or ovarian cancer? No   Do you have 2 or more relatives with breast and/or bowel cancer? No     click delete button to remove this line now  Mammogram Screening: Recommended annual mammography  Pertinent mammograms are reviewed under the imaging tab.    History of abnormal Pap smear: NO - age 30- 65 PAP every 3 years recommended  PAP / HPV Latest Ref Rng & Units 2018   PAP (Historical) - NIL NIL NIL   HPV16 NEG:Negative Negative Negative -   HPV18 NEG:Negative Negative Negative -   HRHPV NEG:Negative Negative Negative -     Reviewed and updated as needed this visit by clinical staff                Reviewed and updated as needed this visit by Provider               Past Medical History:   Diagnosis Date     Abnormal glandular Papanicolaou smear of cervix     Negative colpo per patient     Chickenpox      Chronic maxillary sinusitis 3/31/2006     CTS (carpal tunnel syndrome) 3/6/2014     Situational anxiety 2012    Related to her cancer diagnosis      Past Surgical History:   Procedure Laterality Date     BRONCHOSCOPY FLEXIBLE  2013    related to carcinoid;  Flexible Bronchoscopy, Endobronchial Flexible Ultrasound;  Surgeon: Chris Sal MD;  Location: [facility]     CHOLECYSTECTOMY       ENDOBRONCHIAL ULTRASOUND FLEXIBLE  2013    Procedure: ENDOBRONCHIAL ULTRASOUND FLEXIBLE;;  Surgeon: Chris Sal MD;  Location: [facility]     GASTRIC BYPASS       HERNIA REPAIR      , supraumbilical     HERNIORRHAPHY INCISIONAL (LOCATION)  2011    Procedure:HERNIORRHAPHY INCISIONAL (LOCATION); Incisional Hernia Repair upper abdomen midline       LUNG LOBECTOMY      for carcinoid, done at [place]     REVISION KATHY-EN-Y       TONSILLECTOMY  age 7     ZZC REMOVAL OF LUNG,BILOBECTOMY Right 2013    Carcinoid: Right Middle and Lower Lobe     OB History    Para Term  AB Living   2 2 2 0 0 2  "  SAB IAB Ectopic Multiple Live Births   0 0 0 0 2      # Outcome Date GA Lbr Jcarlos/2nd Weight Sex Delivery Anes PTL Lv   2 Term 10/31/12 38w6d 03:28 / 00:12 3.742 kg (8 lb 4 oz) F Vag-Spont INT  EAMON      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   HENT: Negative for congestion, ear pain, hearing loss and sore throat.    Eyes: Negative for pain and visual disturbance.   Respiratory: Negative for cough and shortness of breath.    Cardiovascular: Negative for chest pain, palpitations and peripheral edema.   Gastrointestinal: Negative for abdominal pain, constipation, diarrhea, heartburn, hematochezia and nausea.   Breasts:  Negative for tenderness, breast mass and discharge.   Genitourinary: Negative for dysuria, frequency, genital sores, hematuria, pelvic pain, urgency, vaginal bleeding and vaginal discharge.   Musculoskeletal: Positive for myalgias. Negative for arthralgias and joint swelling.   Skin: Negative for rash.   Neurological: Negative for dizziness, weakness, headaches and paresthesias.   Psychiatric/Behavioral: Negative for mood changes. The patient is not nervous/anxious.         OBJECTIVE:   BP (!) 142/82 (BP Location: Right arm, Cuff Size: Adult Large)   Pulse 80   Temp 98.4  F (36.9  C) (Tympanic)   Resp 16   Ht 1.575 m (5' 2\")   Wt 125.6 kg (277 lb)   LMP 2021   BMI 50.66 kg/m    Physical Exam  GENERAL: healthy, alert and no distress  EYES: Eyes grossly normal to inspection, PERRL and conjunctivae and sclerae normal  HENT: ear canals and TM's normal, " nose and mouth without ulcers or lesions  NECK: no adenopathy, no asymmetry, masses, or scars and thyroid normal to palpation  RESP: lungs clear to auscultation - no rales, rhonchi or wheezes  BREAST: normal without masses, tenderness or nipple discharge and no palpable axillary masses or adenopathy  CV: regular rate and rhythm, normal S1 S2, no S3 or S4, no murmur, click or rub, no peripheral edema and peripheral pulses strong  ABDOMEN: soft, nontender, no hepatosplenomegaly, no masses and bowel sounds normal  : Normal external genitalia normal cervix no discharge no cervical tenderness  MS: no gross musculoskeletal defects noted, no edema  SKIN: no suspicious lesions or rashes  NEURO: Normal strength and tone, mentation intact and speech normal  PSYCH: mentation appears normal, affect normal/bright    Diagnostic Test Results:  Labs reviewed in Epic  Results for orders placed or performed in visit on 01/11/22   Comprehensive metabolic panel (BMP + Alb, Alk Phos, ALT, AST, Total. Bili, TP)     Status: Abnormal   Result Value Ref Range    Sodium 137 133 - 144 mmol/L    Potassium 3.7 3.4 - 5.3 mmol/L    Chloride 104 94 - 109 mmol/L    Carbon Dioxide (CO2) 29 20 - 32 mmol/L    Anion Gap 4 3 - 14 mmol/L    Urea Nitrogen 16 7 - 30 mg/dL    Creatinine 0.62 0.52 - 1.04 mg/dL    Calcium 9.1 8.5 - 10.1 mg/dL    Glucose 134 (H) 70 - 99 mg/dL    Alkaline Phosphatase 104 40 - 150 U/L    AST 20 0 - 45 U/L    ALT 30 0 - 50 U/L    Protein Total 7.6 6.8 - 8.8 g/dL    Albumin 3.7 3.4 - 5.0 g/dL    Bilirubin Total 0.3 0.2 - 1.3 mg/dL    GFR Estimate >90 >60 mL/min/1.73m2   Lipid panel reflex to direct LDL Fasting     Status: Abnormal   Result Value Ref Range    Cholesterol 262 (H) <200 mg/dL    Triglycerides 191 (H) <150 mg/dL    Direct Measure HDL 63 >=50 mg/dL    LDL Cholesterol Calculated 161 (H) <=100 mg/dL    Non HDL Cholesterol 199 (H) <130 mg/dL    Patient Fasting > 8hrs? No     Narrative    Cholesterol  Desirable:  <200  mg/dL    Triglycerides  Normal:  Less than 150 mg/dL  Borderline High:  150-199 mg/dL  High:  200-499 mg/dL  Very High:  Greater than or equal to 500 mg/dL    Direct Measure HDL  Female:  Greater than or equal to 50 mg/dL   Male:  Greater than or equal to 40 mg/dL    LDL Cholesterol  Desirable:  <100mg/dL  Above Desirable:  100-129 mg/dL   Borderline High:  130-159 mg/dL   High:  160-189 mg/dL   Very High:  >= 190 mg/dL    Non HDL Cholesterol  Desirable:  130 mg/dL  Above Desirable:  130-159 mg/dL  Borderline High:  160-189 mg/dL  High:  190-219 mg/dL  Very High:  Greater than or equal to 220 mg/dL   Ferritin     Status: Abnormal   Result Value Ref Range    Ferritin 9 (L) 12 - 150 ng/mL   Hemoglobin A1c     Status: Abnormal   Result Value Ref Range    Hemoglobin A1C 6.6 (H) 0.0 - 5.6 %   Iron and iron binding capacity     Status: Abnormal   Result Value Ref Range    Iron 77 35 - 180 ug/dL    Iron Binding Capacity 490 (H) 240 - 430 ug/dL    Iron Sat Index 16 15 - 46 %   CBC with platelets     Status: Normal   Result Value Ref Range    WBC Count 9.2 4.0 - 11.0 10e3/uL    RBC Count 4.40 3.80 - 5.20 10e6/uL    Hemoglobin 13.6 11.7 - 15.7 g/dL    Hematocrit 41.0 35.0 - 47.0 %    MCV 93 78 - 100 fL    MCH 30.9 26.5 - 33.0 pg    MCHC 33.2 31.5 - 36.5 g/dL    RDW 14.6 10.0 - 15.0 %    Platelet Count 282 150 - 450 10e3/uL   Hepatitis C Screen Reflex to HCV RNA Quant and Genotype     Status: Normal   Result Value Ref Range    Hepatitis C Antibody Nonreactive Nonreactive    Narrative    Assay performance characteristics have not been established for newborns, infants, and children.   HPV High Risk Types DNA Cervical     Status: None   Result Value Ref Range    Other HR HPV Negative Negative    HPV16 DNA Negative Negative    HPV18 DNA Negative Negative    FINAL DIAGNOSIS       This patient's sample is negative for HPV DNA.        This test was developed and its performance characteristics determined by the [facility]. It has not been cleared or approved by the FDA. The laboratory is regulated under CLIA as qualified to perform high-complexity testing. This test is used for clinical purposes. It should not be regarded as investigational or for research.    METHODOLOGY: The Roche Fredrick 4800 system uses automated extraction, simultaneous amplification of HPV (L1 region) and beta-globin, followed by real time detection of fluorescent labeled HPV and beta globin using specific oligonucleotide probes. The test specifically identified types HPV 16 DNA and HPV 18 DNA while concurrently detecting the rest of the high risk types (31, 33, 35, 39, 45, 51, 52, 56, 58, 59, 66 or 68).    COMMENTS: This test is not intended for use as a screening device for woman under age 30 with normal cervical cytology. Results should be correlated with cytologic and histologic findings. Close clinical followup is recommended.       Gynecologic Cytology (PAP)     Status: None   Result Value Ref Range    Interpretation        Negative for Intraepithelial Lesion or Malignancy (NILM)    Comment         Papanicolaou Test Limitations:  Cervical cytology is a screening test with limited sensitivity, and regular screening is critical for cancer prevention.  Pap tests are primarily effective for the diagnosis/prevention of squamous cell carcinoma, not adenocarcinoma or other cancers.        Specimen Adequacy       Satisfactory for evaluation, endocervical/transformation zone component absent    Clinical Information       none      Reflex Testing Yes regardless of result     Previous Abnormal?       No      Performing Labs       The technical component of this testing was completed at [facility]         ASSESSMENT/PLAN:   (Z00.00) Routine general medical examination at a health care facility  (primary encounter diagnosis)  Comment:   Plan:  Gynecologic Cytology (PAP), CBC with platelets,        Hemoglobin A1c, Ferritin, Lipid panel reflex to        direct LDL Fasting, Comprehensive metabolic         panel (BMP + Alb, Alk Phos, ALT, AST, Total.         Bili, TP), HPV Hold (Lab Only), HPV High Risk         Types DNA Cervical            (Z11.59) Need for hepatitis C screening test  Comment:   Plan: Hepatitis C Screen Reflex to HCV RNA Quant and         Genotype            (D64.9) Anemia, unspecified type  Comment: Patient has history of anemia iron levels were mildly low recommend restarting iron supplements  Plan: CBC with platelets, Iron and iron binding         capacity, Ferritin, OFFICE/OUTPT VISIT,EST,LEVL        IV           (E66.01) Obesity, Class III, BMI 40-49.9 (morbid obesity) (H)  Comment: Patient has been seen in the past at the bariatric clinic for bariatric surgery. Patient's hemoglobin A1c and lipids are elevated recommend starting medications for diabetes patient would like to start with diet control and if not improved in 3 months we will look at starting medications referral made to diabetic weight loss center  Plan: Comprehensive Weight Management, OFFICE/OUTPT         VISIT,EST,LEVL IV      (E21.1) Secondary hyperparathyroidism, non-renal (H)  Comment:  Controlled no change in treatment plan  Plan: OFFICE/OUTPT VISIT,EST,LEVL IV      (C7A.00) Malignant carcinoid tumor, unspecified site (H)  Comment:   Plan: OFFICE/OUTPT VISIT,EST,LEVL IV      (E11.9) Type 2 diabetes mellitus without complication, without long-term current use of insulin (H)  Comment: Reviewed at length patient would like to start diet and exercise control before starting medications we will recheck hemoglobin A1c in 3 months did express that importance of monitoring this closely and allowing diet and exercise control for review diabetic management  Plan: **A1C FUTURE 3mo, OFFICE/OUTPT VISIT,EST,LEVL         IV      (Z13.220) Screening for hyperlipidemia  Comment:  "Patient does have hyperlipidemia based on cholesterol numbers. States her diet has been very poor would like to start diet management and recheck lipids in 3 months and if still elevated will start cholesterol medication  Plan: Lipid panel reflex to direct LDL Fasting        Patient has been advised of split billing requirements and indicates understanding: Yes  COUNSELING:  Reviewed preventive health counseling, as reflected in patient instructions       Regular exercise       Healthy diet/nutrition       Vision screening       Hearing screening       MMR vaccine reminder (1 dose) for patients born after 1959 with no documented vaccination/immunity        Aspirin prophylaxis       Alcohol Use       Family planning       Folic Acid       Colon cancer screening       Consider Hep C screening for all patients one time for ages 18-79 years       HIV screeninx in teen years, 1x in adult years, and at intervals if high risk    Estimated body mass index is 49.57 kg/m  as calculated from the following:    Height as of 20: 1.575 m (5' 2\").    Weight as of 20: 122.9 kg (271 lb).    Weight management plan: Patient referred to endocrine and/or weight management specialty    She reports that she quit smoking about 13 years ago. Her smoking use included cigarettes. She has a 0.80 pack-year smoking history. She has never used smokeless tobacco.      Counseling Resources:  ATP IV Guidelines  Pooled Cohorts Equation Calculator  Breast Cancer Risk Calculator  BRCA-Related Cancer Risk Assessment: FHS-7 Tool  FRAX Risk Assessment  ICSI Preventive Guidelines  Dietary Guidelines for Americans, 2010  USDA's MyPlate  ASA Prophylaxis  Lung CA Screening    GILDA Garay CNP  [facility]  "

## 2022-01-13 ENCOUNTER — HOSPITAL ENCOUNTER (OUTPATIENT)
Dept: MAMMOGRAPHY | Facility: CLINIC | Age: 42
Discharge: HOME | End: 2022-01-13
Attending: INTERNAL MEDICINE | Admitting: INTERNAL MEDICINE
Payer: COMMERCIAL

## 2022-01-13 DIAGNOSIS — Z12.31 VISIT FOR SCREENING MAMMOGRAM: ICD-10-CM

## 2022-01-13 LAB
BKR LAB AP GYN ADEQUACY: NORMAL
BKR LAB AP GYN INTERPRETATION: NORMAL
BKR LAB AP HPV REFLEX: NORMAL
BKR LAB AP PREVIOUS ABNORMAL: NORMAL
PATH REPORT.COMMENTS IMP SPEC: NORMAL
PATH REPORT.COMMENTS IMP SPEC: NORMAL
PATH REPORT.RELEVANT HX SPEC: NORMAL

## 2022-01-13 PROCEDURE — 77067 SCR MAMMO BI INCL CAD: CPT

## 2022-01-14 LAB
HUMAN PAPILLOMA VIRUS 16 DNA: NEGATIVE
HUMAN PAPILLOMA VIRUS 18 DNA: NEGATIVE
HUMAN PAPILLOMA VIRUS FINAL DIAGNOSIS: NORMAL
HUMAN PAPILLOMA VIRUS OTHER HR: NEGATIVE

## 2022-01-16 PROBLEM — E78.5 HYPERLIPIDEMIA LDL GOAL <70: Status: ACTIVE | Noted: 2022-01-16

## 2022-01-19 ENCOUNTER — VIRTUAL VISIT (OUTPATIENT)
Dept: SURGERY | Facility: CLINIC | Age: 42
End: 2022-01-19
Payer: COMMERCIAL

## 2022-01-19 VITALS — WEIGHT: 275 LBS | HEIGHT: 62 IN | BODY MASS INDEX: 50.61 KG/M2

## 2022-01-19 DIAGNOSIS — K91.2 POSTOPERATIVE MALABSORPTION: Primary | ICD-10-CM

## 2022-01-19 DIAGNOSIS — R73.03 BORDERLINE DIABETES: ICD-10-CM

## 2022-01-19 DIAGNOSIS — E23.6 EMPTY SELLA (H): ICD-10-CM

## 2022-01-19 DIAGNOSIS — E66.01 OBESITY, MORBID, BMI 50 OR HIGHER (H): ICD-10-CM

## 2022-01-19 PROCEDURE — 99215 OFFICE O/P EST HI 40 MIN: CPT | Mod: 95 | Performed by: EMERGENCY MEDICINE

## 2022-01-19 ASSESSMENT — MIFFLIN-ST. JEOR: SCORE: 1855.64

## 2022-01-19 NOTE — PATIENT INSTRUCTIONS
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 minute workout apps are nice places to start if you are reasonably healthy.  There are hundreds of other options out there.  Consider viewing YouTube if gentler exercise/movement is desired. Videos on [employer] and chair yoga for seniors exist and are free. Check them out and let's get that 3-4 days a week routine going.    Let's move!  Shon Chand MD.
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
## 2022-01-25 ENCOUNTER — LAB (OUTPATIENT)
Dept: LAB | Facility: CLINIC | Age: 42
End: 2022-01-25
Payer: COMMERCIAL

## 2022-01-25 DIAGNOSIS — K91.2 POSTOPERATIVE MALABSORPTION: ICD-10-CM

## 2022-01-25 DIAGNOSIS — E23.6 EMPTY SELLA (H): ICD-10-CM

## 2022-01-25 LAB
PROLACTIN SERPL-MCNC: 14 UG/L (ref 3–27)
PTH-INTACT SERPL-MCNC: 37 PG/ML (ref 18–80)
T3FREE SERPL-MCNC: 2.1 PG/ML (ref 2.3–4.2)
T4 FREE SERPL-MCNC: 0.93 NG/DL (ref 0.76–1.46)
TSH SERPL DL<=0.005 MIU/L-ACNC: 1.73 MU/L (ref 0.4–4)

## 2022-01-25 PROCEDURE — 99000 SPECIMEN HANDLING OFFICE-LAB: CPT

## 2022-01-25 PROCEDURE — 82306 VITAMIN D 25 HYDROXY: CPT

## 2022-01-25 PROCEDURE — 84481 FREE ASSAY (FT-3): CPT

## 2022-01-25 PROCEDURE — 84425 ASSAY OF VITAMIN B-1: CPT | Mod: 90

## 2022-01-25 PROCEDURE — 84439 ASSAY OF FREE THYROXINE: CPT

## 2022-01-25 PROCEDURE — 84630 ASSAY OF ZINC: CPT | Mod: 90

## 2022-01-25 PROCEDURE — 84443 ASSAY THYROID STIM HORMONE: CPT

## 2022-01-25 PROCEDURE — 84590 ASSAY OF VITAMIN A: CPT | Mod: 90

## 2022-01-25 PROCEDURE — 36415 COLL VENOUS BLD VENIPUNCTURE: CPT

## 2022-01-25 PROCEDURE — 84146 ASSAY OF PROLACTIN: CPT

## 2022-01-25 PROCEDURE — 83970 ASSAY OF PARATHORMONE: CPT

## 2022-01-26 LAB — DEPRECATED CALCIDIOL+CALCIFEROL SERPL-MC: 34 UG/L (ref 20–75)

## 2022-01-28 LAB
VIT B1 PYROPHOSHATE BLD-SCNC: 167 NMOL/L
ZINC SERPL-MCNC: 65.6 UG/DL

## 2022-01-29 LAB
ANNOTATION COMMENT IMP: NORMAL
RETINYL PALMITATE SERPL-MCNC: 0.03 MG/L
VIT A SERPL-MCNC: 0.55 MG/L

## 2022-02-28 ENCOUNTER — TELEPHONE (OUTPATIENT)
Dept: FAMILY MEDICINE | Facility: CLINIC | Age: 42
End: 2022-02-28
Payer: COMMERCIAL

## 2022-02-28 NOTE — TELEPHONE ENCOUNTER
Patient Quality Outreach    Patient is due for the following:   Diabetes -  Microalbumin    NEXT STEPS:   No follow up needed at this time.    Type of outreach:    Chart review performed, no outreach needed.  Patient will be due for a follow up visit April 2022.      Questions for provider review:    None     Selena Dallas, [facility]

## 2022-03-10 ENCOUNTER — VIRTUAL VISIT (OUTPATIENT)
Dept: SURGERY | Facility: CLINIC | Age: 42
End: 2022-03-10
Payer: COMMERCIAL

## 2022-03-10 VITALS — HEIGHT: 62 IN | BODY MASS INDEX: 49.69 KG/M2 | WEIGHT: 270 LBS

## 2022-03-10 DIAGNOSIS — R73.03 BORDERLINE DIABETES: Primary | ICD-10-CM

## 2022-03-10 DIAGNOSIS — R79.89 LOW SERUM TRIIODOTHYRONINE (T3): ICD-10-CM

## 2022-03-10 DIAGNOSIS — R79.0 LOW FERRITIN: ICD-10-CM

## 2022-03-10 PROCEDURE — 99214 OFFICE O/P EST MOD 30 MIN: CPT | Mod: 95 | Performed by: EMERGENCY MEDICINE

## 2022-03-10 NOTE — PATIENT INSTRUCTIONS
Plan:  1. Continue good bariatric supplementation. Choose a Equate complete multivtamin and use twice daily when you exhaust your prenatal supply. Continue MWF iron fumarate use. Consider taking the extra iron with a 100mg vitamin C to improve absorption. Continue B12, D3 and calcium.    2. Ozempic tolerated well at first dose of 0.5mg, continue on this dose until our June check up upon your return home from [place]. Around this time we'll repeat iron studies, A1c and T3 levels.  We may consider increasing dose in June if slow progress.    3. Continue good bariatric meals every 4-5 hours with 15-20g of lean protein per meal and perhaps supplementing when you're on the road regularly w/ protein drink like Premiere protein or Ensure Max Protein vs greek yogurt/cottage cheese or other grab and go portable protein sources.      [facility]  Nutritional Guidelines  Gastric Bypass 18 Months Post Op and Beyond    General Guidelines and Helpful Hints:    Eat 3 meals per day + protein supplement(s). No snacks between meals.  o Do not skip meals.  This can cause overeating at the next meal and will prevent adequate protein and nutritional intake.    Aim for 60-80 grams of protein per day.  o Always eat your protein first. This assists with optimal nutrition and helps you stay full longer.  o Depending on your portion size, you may need to drink approved protein supplement between meals to achieve protein goals. Follow recommendations of your Dietitian.     Eat your protein first, and then follow with fiber.   o It is not necessary to count your fiber, but 15-20 grams per day is recommended.    o Add fiber by including fruits, vegetables, whole grains, and beans.     Portions should remain about 1 cup per meal. Use measuring cups to be accurate.    Continue to use saucer/salad plates, infant/toddler silverware to keep portion sizes small and take small bites.    Eat S-[id number] to make each meal last 20-30  "minutes. Always stop eating when satisfied.    Continue to use caution with foods containing skins, peels or membranes. Chew well!    Aim for 64 oz. of calorie-free fluids daily.  o Continue to avoid caffeine and carbonation. If you choose to drink alcohol, do so in moderation.   o Remember to avoid drinking during meals, 15-30 minutes before and 30 minutes after.    Exercise is gonzalez for continued weight loss and weight maintenance. 150 minutes weekly of moderate aerobic activity or 75 minutes of vigorous with 2 days or more a week of strength training. Try to get 20% or more of your steps each day at a brisk pace, as though hurrying to a bus stop. Look to get stronger this year.    If having trouble tolerating meat, try using a crock-pot, tinfoil tent, steamer or other moist cooking method to create tender meats. Add broth or low-fat gravy to help meat stay moist.     Avoid high sugar and high fat foods to prevent dumping syndrome.  o Check nutrition labels for less than 10 grams of sugar and less than 10 grams of fat per serving.    Continue Taking Vitamins/Minerals:  o 1000 mcg of Sublingual B-12 at least 3 days weekly to average 350-500mcg/day. If using 2500mcg lozenges, 2 weekly.  If 5000 mcg, once weekly dosing works.  o 1 Complete Multivitamin with 18mg Iron twice daily (chewable or swallow tabs). Often sold as \"women's one a day\" if tablet but take twice daily.  o 500-600 mg Calcium Citrate twice daily (chewable or swallow tabs).  o 5000 IU Vitamin D3 daily.  o If menstruating, you may need closer to 60mg of iron daily to prevent iron deficiency. An occasional \"boost\" of extra iron supplement during/after is reasonable if heavy flow.    Sample Grocery List    Protein:    Fat free Greek or light yogurt (less than 10 grams sugar)    Fat free or low-fat cottage cheese    String cheese or reduced fat cheese slices    Tuna, salmon, crab, egg, or chicken salad made with light or fat free mayonnaise    Egg or Egg " Substitute    Lean/extra lean turkey, beef, bison, venison (ground, sirloin, round, flank)    Pork loin or tenderloin (grilled, baked, broiled)    Fish such as salmon, tuna, trout, tilapia, etc. (grilled, baked, broiled)    Tender cuts of lean (skinless) turkey or chicken    Lean deli meats: turkey, lean ham, chicken, lean roast beef    Beans such as kidney, garbanzo, black, landeros, or low-fat/fat free refried beans    Peanut butter (natural preferred). Limit to 1 Tbsp. per day.    Low-fat meatloaf (made with lean ground beef or turkey)    Sloppy Joes made with low-sugar ketchup and lean ground beef or turkey    Soy or vegetable protein (i.e. vegan crumbles, soy/veggie burger, tofu)    Hummus    Vegetables:    Fresh: cooked or raw (as tolerated)    Frozen vegetables    Canned vegetables (low sodium or no salt added, rinse before cooking/eating)    (Ok to have skins/peels/membranes/seeds - just chew well)    Fruits:    Fresh fruit    Frozen fruit (no sugar added)    Canned fruit (packed in its own juice, NOT syrup)    (Ok to have skins/peels/membranes/seeds - just chew well)    Starch:    Unsweetened whole-grain hot cereal (or high fiber cold cereal, dry)    Toasted whole wheat bread or Iron Thins    Whole grain crackers    Baked /boiled/mashed potato/sweet potato    Cooked whole grain pasta, brown rice, or other cooked whole grains    Starchy vegetables: corn, peas, winter squash    Protein Supplement:     Ready to drink protein shake with:  o 15-30 grams protein per serving  o Less than 10 grams total carbohydrate per serving     Protein powder mixed with:  o  Skim or 1% milk  o Low fat or fat free Lactaid milk, plain or no sugar added soymilk  o Water     Fats: (use in moderation)    1 teaspoon of soft tub margarine    1 teaspoon olive oil, canola oil, or peanut oil    1 tablespoon of low-fat frank or salad dressing     Sample Menu for 18+ months after Gastric Bypass    You do NOT need to eat/drink the full  portion sizes listed below  Always stop when you are satisfied    Breakfast   cup 1% cottage cheese     cup mixed berries   Lunch 2 oz lean roast beef on   [place] Thin with 1 tsp. light frank    small tomato, chopped, mixed with 1 tsp. light vinaigrette dressing   Supplement Approved protein supplement (if needed between meals)   Dinner 2 oz grilled salmon    cup salad greens with 1 tsp. light salad dressing and 1 tsp. ground flax seed    cup quinoa or brown rice     Breakfast   cup egg substitute with   cup sautéed chopped vegetables  2 light [place] Krisp crackers   Lunch Tuna Melt:   cup tuna mixed with 1 tsp. light frank over   [place] Thin. Top with 2-3 slices cucumber and 1 oz slice of low fat cheese   Supplement 1 cup skim milk (if needed between meals)   Dinner 3 oz  grilled, broiled, or baked seasoned skinless chicken breast    cup asparagus     Breakfast   cup plain oatmeal made with skim or 1% milk with 1 Tbsp. flavored/unflavored protein powder added  1 mozzarella string cheese   Lunch 2 oz deli turkey breast  1/3 cup salad with 1 tsp. light salad dressing, 1/8 of a whole avocado and 1 Tbsp. sunflower seeds   Dinner 3 oz. pork loin made in a crock pot, seasoned with a spice rub    cup cooked carrots   Supplement Approved protein supplement (if needed between meals)     Breakfast 1 cup breakfast casserole made with egg substitute, turkey sausage,  and steamed, chopped bell peppers   Supplement  1 cup light Greek yogurt (if needed between meals)   Lunch 2 oz. teriyaki turkey    cup mashed sweet potato with 1-2 spritzes of spray butter    cup fresh pineapple   Dinner 3 oz low fat meatloaf    cup roasted garlic zucchini     Breakfast   cup leftover breakfast casserole    cup no sugar added applesauce with 1 Tbsp. unflavored protein powder and a sprinkle of cinnamon    Lunch 3 oz shrimp with 1-2 Tbsp. low-sugar cocktail sauce for dipping    c. whole wheat pasta drizzled with   tsp. olive oil   Supplement 1 cup  skim/1% milk with scoop of protein powder (if needed between meals)   Dinner Grilled, seasoned kebob with 2 oz lean beef and   cup vegetables     Breakfast Breakfast pizza:   [place] Thin spread with 1 Tbsp. low sugar spaghetti sauce,   cup shredded low fat cheese, melted and 1 slice of [place]ablo harmon     cup fresh fruit mixed with chopped almonds   Lunch   cup black bean soup  4-5 whole grain crackers   Dinner 3 oz  tilapia with lemon pepper seasoning    cup stewed tomatoes   Supplement 1 string cheese (if needed between meals)     Breakfast 2 hard boiled eggs (discard 1 egg yolk)    whole wheat English Muffin with 1 tsp. low sugar jelly   Lunch   cup leftover black bean soup topped with 1-2 Tbsp. low fat cheese  2-3 light [place] Krisp crackers   Supplement Approved protein supplement (if needed between meals)   Dinner 3 oz sirloin steak    cup steamed broccoli     MEDICATIONS FOR WEIGHT LOSS  There are several medications available to assist us in weight loss.  By themselves, without compliance to a change in diet and increase in movement/activity these medications are disappointing in their results. However, combined with a closely monitored program of diet change and exercise they can be very effective in controlling appetite and boosting initial weight loss.  All weight loss medications need continual re-evaluation for efficacy as their side effects and health benefits fail to be worthwhile if a person is not continuing to lose weight or in maintaining their healthy weight.  Some weight loss medications are scheduled drugs, meaning there is at least a theoretical possibility for developing addiction to them but in practice this is rare.  We do anticipate coming off meds in the future after stabilization of weight loss is assurred.  Finally, a tolerance can develop and people s perceived efficacy of medication can diminish.  In communication with your physician, it may be appropriate to intermittently take a break  from these medications and then restart again (few weeks off then restart again) if a plateau is reached that cannot be broken through.  Each person can respond to a medication differently and to be a good option for you, it will need to be affordable, effective and well tolerated with minimal side effects.    In most cases, weight loss progress after one month and three months will be obtained and if a patient is not reaching the satisfactory progress towards weight loss, the medications may be discontinued.  The thought is that if a person is taking a weight loss medication and not receiving the potential health benefit of that drug, the side effects are not worthwhile and use should be discontinued.  On the flip side, there are many people on some weight loss medications for years because it continues to be an effective tool in their weight management and they are tolerating the medication without any long-term side effects.  Each person's response and purpose will be evaluated.      PHENTERMINE (Adipex): approved in 1959 for appetite suppression.  It has stimulant effects and cannot be used with Ritalin, Concerta, or other stimulants.  Although it is not highly addictive, it's chemically related to amphetamines which are addictive.  Occasional dependence can develop, but rarely. The most common side effects are dry mouth, increased energy and concentration, increased pulse, and constipation.  You should not take phentermine if you have glaucoma, hyperthyroidism, or uncontrolled/untreated hypertension or overly anxious. You should stop if dramatic mood swings, severe insomnia, palpations, chest pains, visual changes or if your Blood Pressure is consistently elevated or any time it's over 160/90.   It's ok to go off the med for a few weeks and restart if efficacy is wearing off.  $24-$30 for 90 tablets at [facility]. Females are required to have reliable birth control to reduce the risk birth  defects/miscarriage.      TOPIRAMATE (Topamax): Anti-seizure medication, also used to prevent migraines and sometimes for mood stabilization.  Side effects include paresthesia, glaucoma, altered concentration, attention difficulties, memory and speech problems, metabolic acidosis, depression, increase in body temperature and decrease sweating, risk of kidney stones.  Do not take Topamax while taking Depakote as this can cause high ammonia levels.  You must have reliable birth control as Topamax can cause birth defects.  If prolonged use has occurred it should be tapered off slowly to avoid withdrawal issues.  Insurance usually covers Topiramate.  At higher doses, there may be some confusion/forgetfulness associated with this so we try to limit dose to under 75mg twice daily to reduce this risk. Often covered by insurance as it's used for many reasons.  Topamax will cause carbonated beverages to taste bad. A recheck of your kidney/electrolytes may occur within a few months of starting.    QSYMIA (Phentermine + Topamax):  See above information about phentermine and Topamax.  Most common side effects are paresthesia, dizziness, distortion of taste, insomnia, constipation, and dry mouth.  See above descriptions for the two individual agents.Females are required to have reliable birth control to reduce the risk birth defects/miscarriage.  $150-$220 per month      GLP1 Agonists:  Liraglutide (Victoza/Saxenda), Semaglutide (Ozempic/Wegovy):   Part of the family of Glucagon Like Peptide Agonists, these medications directly suppresses appetite and are often used by diabetic patients due to improvements in glucose/insulin balance.  They also slow how quickly the stomach empties so increase fullness. They may be hard to get covered for non diabetics and some plans have exclusions for weight loss purposes.  Currently, these are  injectable medications delivered via autoinjector pen. It can be very costly without insurance  "coverage (over $500/month).  Small risk for pancreatitis and dose should be held if increased mid abdominal pain/burning. It is not to be used if previous Multiple Endocrine Neoplasia. In rodents, may increase risk of thyroid tumors and not indicated for anyone with hx of medullary thyroid cancer as a result.  If changes in voice/swallowing should be discontinued. Reliable birth control required in women. [email or web address], [email or web address] has more information on these medications.    Contrave (Bupropion/Naltrexone).    Synergistic combination of a mild appetite suppressing anti-depressant (Bupropion) whose effects are increased due to interaction with Naltrexone.  Naltrexone may have some effects on craving and is often used in addiction medicine to help previous opiate addicts be less prone to relapse as it blocks the action of opiates. Should be stopped if any need for opiate pain medication, surgery or planned procedures where you'll be given sedation/anesthesia. If prolonged use recommend stepping down bupropion over 2-3 weeks to limit any risk of withdrawal issues. Side effects may include dry mouth, increased heart rate, mild elevation in Blood pressure;  dizziness, ringing in the ears, anxiety (typically due to bupropion), nausea, constipation, and some get fatigued with naltrexone.  About $210 on [facility] for 120 tabs of \"Contrave\", the brand name without insurance coverage. Generic Bupropion 75mg: $25 for 120 tabs, Naltrexone: $55 for 90 tabs without insurance coverage on [facility]. Cannot be used if pregnant/trying to conceive or breast feeding.      Plenity:   Recently available October 2020, by mail order pharmacy only, but expensive. $98/month.  2-3 capsules taken 20 minutes before 2 meals daily provides crystals that expand into a gel that provides a mechanical fullness. Gel mixes with your next meal and increases satisfaction by bulking the meal up to feel bigger than it truly is. Plenity is not absorbed and " "gets passed through the digestive tract and excreted in stools. May cause some bloating/gas/full feeling as it behaves like a fiber in many ways. Cost not available yet. FDA cleared in March of 2019 but not available in stores as of March of 2020. Clearance safety and efficacy data done under \"Gelesis\" name. Not appropriate for people with stomach or bowel motility issues as requires you to pass it through digestive tract. [email or web address] has more information.    LEAN PROTEIN SOURCES  Getting 20-30 grams of protein, 3 meals daily, is appropriate for most people, some need more but more than about 40 grams per meal is not useful.  General rule is drinking one ounce of water per gram of protein eaten over the course of the day:  70 grams of protein each day, drink 70 oz of water.  Protein Source Portion Calories Grams of Protein                           Nonfat, plain Greek yogurt    (10 grams sugar or less) 3/4 cup (6 oz)  12-17   Light Yogurt (10 grams sugar or less) 3/4 cup (6 oz)  6-8   Protein Shake 1 shake 110-180 15-30   Skim/1% Milk or lactose-free milk 1 cup ( 8 oz)  8   Plain or light, flavored soymilk 1 cup  7-8   Plain or light, hemp milk 1 cup 110 6   Fat Free or 1% Cottage Cheese 1/2 cup 90 15   Part skim ricotta cheese 1/2 cup 100 14   Part skim or reduced fat cheese slices 1 ounce 65-80 8     Mozzarella String Cheese 1 80 8   Canned tuna, chicken, crab or salmon  (canned in water)  1/2 cup 100 15-20   White fish (broiled, grilled, baked) 3 ounces 100 21   [place]/Tuna (broiled, grilled, baked) 3 ounces 150-180 21   Shrimp, Scallops, Lobster, Crab 3 ounces 100 21   Pork loin, Pork Tenderloin 3 ounces 150 21   Boneless, skinless chicken /turkey breast                          (broiled, grilled, baked) 3 ounces 120 21   [place], [place], [place], and Venison 3 ounces 120 21   Lean cuts of red meat and pork (sirloin,   round, tenderloin, flank, ground 93%-96%) 3 ounces 170 21   Lean or Extra " Lean Ground Turkey 1/2 cup 150 20   90-95% Lean [place] Burger 1 chong 140-180 21   Low-fat casserole with lean meat 3/4 cup 200 17   Luncheon Meats                                                        (turkey, lean ham, roast beef, chicken) 3 ounces 100 21   Egg (boiled, poached, scrambled) 1 Egg 60 7   Egg Substitute 1/2 cup 70 10   Nuts (limit to 1 serving per day)  3 Tbsp. 150 7   Nut [place] (peanut, almond)  Limit to 1 serving or less daily 1 Tbsp. 90 4   Soy Burger (varies) 1  15   Garbanzo, Black, [place] Beans 1/2 cup 110 7   Refried Beans 1/2 cup 100 7   Kidney and Lima beans 1/2 cup 110 7   Tempeh 3 oz 175 18   Vegan crumbles 1/2 cup 100 14   Tofu 1/2 cup 110 14   Chili (beans and extra lean beef or turkey) 1 cup 200 23   Lentil Stew/Soup 1 cup 150 12   Black Bean Soup 1 cup 175 12         Exercise Guidance    Nearly everything that bothers us gets better when the proper amount of exercise can be done in the proper amounts.  Getting to that level safely and without injury is the key.  When it comes to weight loss, exercise is especially important in maintaining the weight loss.  Unfortunately, one of the harsh realities is that substantial weight loss slows our metabolism, often anywhere from 5-20%.    Our brain always remembers our heaviest weight and we can return to that if we're not mindful and moving regularly.  Our biology doesn't understand the concept of having too much energy, only not having enough.  As such, when we lose weight, it's thought that the brain interprets this as we're ill or in a famine and dials back our metabolism to limit further weight loss.  This is why exercise is so important in keeping the weight off and is the main reason people have some weight regain from their low weight point after weight loss.  We have to make up that 10-20% of calories not being burned.Since we can restrict our intake for only so long, exercise becomes very important in our long term healthy  weigh maintenance to balance out the occasional indiscretion with our diet.    Generally, for every 5% body weight reduction in a weight loss season, a person needs to add  kilocalories of exercise in their daily routine to keep that weight off for the long term.  This is why it's vital to be starting your fitness regimen during weight loss season, so that routine is well established as you move into your maintenance period.    Additionally, all sorts of good enzymes and genes turn on with exercise and our stress, sleep, mood and bodies feel better when we can get to the point of making ourselves a little sweaty and short of breath 35-50 minutes most days of the week. But we have to start with what we can do first and give ourselves permission to work our way up to this goal.    Who isn't ready for exercise? Well, if you get severe dizziness/palpitations, chest pain or short of breath/faint with even minimal activity like walking across a room or you're having to pause while going up a flight of stairs, then getting your heart and/or lungs fully evaluated prior to starting an exercise regimen is recommended. Everyone else can probably start a program, but everyone may start at a different point:  Some can set a 5-10 minute walking goal and others will be able to ride their bike for an hour.      Start with where you're at and look to add 10% more each week until you're at that 150 minutes or more a week (or 75 minutes/week or more of vigorous exercise). Moderate exercise can be estimated as the pace you can carry on a conversation and vigorous is the pace at which you can get 3-5 words out before having to take a breath.  If you're using heart rate monitoring, Moderate is about 60% of your maximum heart rate and vigorous about 75%. (Max heart rate estimated as 220 beats minus your age:  Example: 220-age of 42 =176 Beats per minute (BPM) maximum. 0.6X 176= 105 BPM (moderate), 132 BMP(vigorous)).    If you like  "to count steps, the 10,000 steps per day does correlate well with weight maintenance but try to make at least 20-25% of those steps at a brisk pace (like you are about to miss your bus).    Finally, if you are pressed for time, it's important to know that some exercise is better than none.  High Intensity Interval training (HIIT) is a good way to get as much out of a short period of working out. If you can't walk, use the stairs, bike or swim; you could use a punching/arm workout regimen for your activity.  The idea with HIIT is to have a 3-6 minute warm up period of low intensity and the 3-6 \"intervals\" where you push the intensity up and then recover and start the next interval. One study showed that 3 intervals of 20 seconds at \"Maximum Effort\" while either biking on a stationary bike or going up stairs and then having 100 seconds recovery time before the next Maximum Effort was equally as beneficial on cardiovascular fitness development as doing 30 minutes of moderately paced walking 3 days weekly over a 6 week period of time.  So intensity matters. You just need to be able to safely do your desired exercise without injury. There are many great HIIT exercises/routines out there. IF you're not doing much exercise currently, I recommend giving your self 2-3 weeks of moderate exercise, 3 days weekly minimum to get your bones/tendons/muscles used to exercise before going for High Intensity workouts.    If you like to use Apps on the phone, the couch to 5k alem and 7 minute workout apps are nice places to start if you are reasonably healthy.  There are hundreds of other options out there.  Consider viewing [employer] if gentler exercise/movement is desired. Videos on [employer]der [employer] and chair yoga for seniors exist and are free. Check them out and let's get that 3-4 days a week routine going.    Let's move!  Shon Chand MD.     [place], try to walk outside if possible the days you're in [place] for at least 15 minutes to " destress/process stress. More is fine if time allows.

## 2022-03-10 NOTE — LETTER
3/10/2022         RE: Israel Zabala  [id number]  [facility] [id number]        Dear Colleague,    Thank you for referring your patient, Israel Zabala, to the [facility]ATR[facility]ARE [place]. Please see a copy of my visit note below.    Israel Zabala is 41 year old  female who presents for a billable video visit today.    How would you like to obtain your AVS? MyChart  If dropped from the video visit, the video invitation should be resent by: Text to cell phone: [phone]  Will anyone else be joining your video visit? No      Video Start Time: 11:13 AM    Are there any specific questions or needs that you would like addressed at your visit today? No, follow up     Provider Notes:   Bariatric Follow Up Visit with a History of Previous Bariatric Surgery     Date of visit: 3/10/2022  Physician: Shon Chand MD, MD  Primary Care Provider:  Darcy Yang  Israel Zabala   41 year old  female    Date of Surgery: 2003  Initial Weight: 284 lbs  Initial BMI: n/a  Today's Weight:   Wt Readings from Last 1 Encounters:   03/10/22 122.5 kg (270 lb)     Weight history:   Wt Readings from Last 4 Encounters:   03/10/22 122.5 kg (270 lb)   01/19/22 124.7 kg (275 lb)   01/11/22 125.6 kg (277 lb)   12/18/20 122.9 kg (271 lb)      Body mass index is 49.38 kg/m .      Assessment and Plan     Assessment: Israel is a 41 year old year old female who is 19 years s/p  Dung en Y Gastric Bypass with Dr. Dey at [facility]. She established care with us in 2018 after weight regain to 276 lbs and has developed borderline diabetes in the interim with A1c of 6.6% in January this year (6.4% back in 2018) and we recently started Ozempic therapy to complement her metabolic syndrome and to assist weight loss goals.  She reports today in follow up since starting the Ozempic that the medication is helpful and well tolerated. We'll continue on the 0.5mg dose and keep her there while traveling to  [place] to get her daughter a surgery.  Multivitamin use is now twice daily w/ 3 days weekly iron supplementation to address her low ferritin levels on labs this year.  Her T3 levels were mildly low at 2.1 and we'll recheck those in the next 3 months to see if normalizing with weight reduction/more mindful vitamin supplementation. TSH is still in normal range but if struggling, optimization of T3 and T4 would be warranted.            Encounter Diagnoses   Name Primary?     Borderline diabetes Yes     Low serum triiodothyronine (T3)      Low ferritin          Current Outpatient Medications:      Cholecalciferol (VITAMIN D) 2000 UNITS tablet, Take 1 tablet by mouth daily, Disp: , Rfl:      ferrous sulfate (IRON) 325 (65 FE) MG tablet, Take 1 tablet (325 mg) by mouth 2 times daily, Disp: 60 tablet, Rfl: 2     fluticasone (FLONASE) 50 MCG/ACT nasal spray, Spray 1 spray in nostril daily, Disp: 16 g, Rfl: 0     Multiple Vitamins-Minerals (MULTIVITAMIN PO), , Disp: , Rfl:      semaglutide (OZEMPIC) 2 MG/1.5ML SOPN pen, Start 0.25mg injected every 7 days (once weekly) for 4 weeks then increase, if tolerated, to 0.5mg injected every 7 days (once weekly)., Disp: 1.5 mL, Rfl: 1     VITAMIN B-12 PO, 1 TABLET ORALLY DAILY(HOME MED), Disp: , Rfl:     Plan:  1. Continue good bariatric supplementation. Choose a Equate complete multivtamin and use twice daily when you exhaust your prenatal supply. Continue MWF iron fumarate use. Consider taking the extra iron with a 100mg vitamin C to improve absorption. Continue B12, D3 and calcium.    2. Ozempic tolerated well at first dose of 0.5mg, continue on this dose until our June check up upon your return home from [place]. Around this time we'll repeat iron studies, A1c and T3 levels.  We may consider increasing dose in June if slow progress.    3. Continue good bariatric meals every 4-5 hours with 15-20g of lean protein per meal and perhaps supplementing when you're on the road  "regularly w/ protein drink like Premiere protein or Ensure Max Protein vs greek yogurt/cottage cheese or other grab and go portable protein sources.      No follow-ups on file.    Bariatric Surgery Review     Interim History/LifeChanges: just bumped ozempic up to 0.5mg this past week. Some tendency towards eustachian tube dysfunction and not sure if some intermittent vetigo sx this month.  CGM average 122mg/dl now  Will be in [place] until early June.   Patient Concerns: review meds. Med working well, feeling restriction better for the first time in \"very long time\". Using Bariatric method better. More protein focus is going well.   270 lbs.  Appetite (1-10): improved  GERD: no.    Reviewed whether any need/indication for screening EGD today and we will deferred.  Typically, a screening EGD is recommend post op year 2-3 if no symptoms to assess health of esophagus/bariatric surgery and sooner if difficult to control GERD or persistent pain/dysphagia sx despite behavior modification.    Medication changes: tolerating ozempic    Vitamin Intake:   B-12   yes, 500mcg she thinks.   MVI  twice daily now   Vitamin D  D3 5000IUs   Calcium   caltrate 600 w/ some D3, once daily.     Other  finishing her prenatal vitamins as her multivitamin and has backed off on iron extra supplementation to [facility]              LABS: ordered      Most recent labs:  Lab Results   Component Value Date    WBC 9.2 01/11/2022    HGB 13.6 01/11/2022    HCT 41.0 01/11/2022    MCV 93 01/11/2022     01/11/2022     Lab Results   Component Value Date    CHOL 262 (H) 01/11/2022     Lab Results   Component Value Date    HDL 63 01/11/2022     No components found for: LDLCALC  Lab Results   Component Value Date    TRIG 191 (H) 01/11/2022     No components found for: CHOLHDL  Lab Results   Component Value Date    ALT 30 01/11/2022    AST 20 01/11/2022    ALKPHOS 104 01/11/2022     No results found for: HGBA1C  No components found for: SNGLCPLG76  No " components found for: NQCYZKOH23NO  No components found for: FERRITIN  No components found for: PTH  No components found for: 83932  No components found for: 7597  Lab Results   Component Value Date    TSH 1.73 2022     No results found for: TESTOSTERONE        Social History     Social History     Socioeconomic History     Marital status:      Spouse name: Not on file     Number of children: 1     Years of education: Not on file     Highest education level: Not on file   Occupational History     Occupation:    Tobacco Use     Smoking status: Former Smoker     Packs/day: 0.10     Years: 8.00     Pack years: 0.80     Types: Cigarettes     Quit date: 2008     Years since quittin.4     Smokeless tobacco: Never Used   Vaping Use     Vaping Use: Never used   Substance and Sexual Activity     Alcohol use: Yes     Comment: occasional     Drug use: No     Sexual activity: Yes     Partners: Male     Birth control/protection: None     Comment: NFP   Other Topics Concern     Parent/sibling w/ CABG, MI or angioplasty before 65F 55M? No   Social History Narrative     Not on file     Social Determinants of Health     Financial Resource Strain: Not on file   Food Insecurity: Not on file   Transportation Needs: Not on file   Physical Activity: Not on file   Stress: Not on file   Social Connections: Not on file   Intimate Partner Violence: Not on file   Housing Stability: Not on file       Past Medical History     Past Medical History:   Diagnosis Date     Abnormal glandular Papanicolaou smear of cervix     Negative colpo per patient     Chickenpox      Chronic maxillary sinusitis 3/31/2006     CTS (carpal tunnel syndrome) 3/6/2014     Situational anxiety 2012    Related to her cancer diagnosis     Problem List     Patient Active Problem List   Diagnosis     Obesity, Class III, BMI 40-49.9 (morbid obesity) (H)     CARDIOVASCULAR SCREENING; LDL GOAL LESS THAN 160     Malignant  "carcinoid tumor (H)     Morbid obesity, unspecified obesity type (H)     Hx of gastric bypass     Excessive bleeding in premenopausal period     White coat syndrome without diagnosis of hypertension     Nipple discharge in female     Postoperative malabsorption     Secondary hyperparathyroidism, non-renal (H)     Type 2 diabetes mellitus without complication, without long-term current use of insulin (H)     Hyperlipidemia LDL goal <70     Medications     [unfilled]  Surgical History     Past Surgical History  She has a past surgical history that includes gastric bypass (2003); tonsillectomy (age 7); Herniorrhaphy incisional (location) (8/19/2011); REMOVAL OF LUNG,BILOBECTOMY (Right, 11/22/2013); Bronchoscopy flexible (11/1/2013); Endobronchial ultrasound flexible (11/1/2013); Revision Dung-En-Y; hernia repair; Cholecystectomy; and Lung Lobectomy.    Objective-Exam     Constitutional:  Ht 1.575 m (5' 2\")   Wt 122.5 kg (270 lb)   BMI 49.38 kg/m    [unfilled]   General:  Pleasant and in no acute distress   Eyes:  EOMI  ENT:  Airway patent    Neck:  Respiratory: Normal respiratory effort, no cough.  CV:  n/a  Gastrointestinal: n/a  Musculoskeletal: muscle mass WNL    Psychiatric: alert and oriented X3, mood and affect normal    Counseling     We reviewed the important post op bariatric recommendations:  -eating 3 meals daily  -eating protein first, getting >60gm protein daily  -eating slowly, chewing food well  -avoiding/limiting calorie containing beverages  -drinking water 15-30 minutes before or after meals  -limiting restaurant or cafeteria eating to twice a week or less    We discussed the importance of restorative sleep and stress management in maintaining a healthy weight.  We discussed the National Weight Control Registry healthy weight maintenance strategies and ways to optimize metabolism.  We discussed the importance of physical activity including cardiovascular and strength training in maintaining a " healthier weight.    We discussed the importance of life-long vitamin supplementation and life-long  follow-up.    Israel was reminded that, to avoid marginal ulcers she should avoid tobacco at all, alcohol in excess, caffeine in excess, and NSAIDS (unless indicated for cardioprotection or othewise and opposed by a PPI).    Shon Chand MD    [facility].  3/10/2022  11:13 AM  [phone] (clinic phone)  [phone] (fax)    No images are attached to the encounter.  Medical Decision Makin minutes spent on the date of the encounter doing chart review, history and exam, documentation and further activities per the note      Video-Visit Details    Type of service:  Video Visit    Video End Time (time video stopped): 11:42 AM  Originating Location (pt. Location): Home    Distant Location (provider location):  [facility] SURGERY CLINIC AND BARIATRICS CARE [place]     Platform used for Video Visit: Chatty      Again, thank you for allowing me to participate in the care of your patient.        Sincerely,        Shon Chand MD

## 2022-03-10 NOTE — PROGRESS NOTES
Israel Zabala is 41 year old  female who presents for a billable video visit today.    How would you like to obtain your AVS? MyChart  If dropped from the video visit, the video invitation should be resent by: Text to cell phone: [phone]  Will anyone else be joining your video visit? No      Video Start Time: 11:13 AM    Are there any specific questions or needs that you would like addressed at your visit today? No, follow up     Provider Notes:   Bariatric Follow Up Visit with a History of Previous Bariatric Surgery     Date of visit: 3/10/2022  Physician: Shon Chand MD, MD  Primary Care Provider:  Darcy Yang  Israel Zabala   41 year old  female    Date of Surgery: 2003  Initial Weight: 284 lbs  Initial BMI: n/a  Today's Weight:   Wt Readings from Last 1 Encounters:   03/10/22 122.5 kg (270 lb)     Weight history:   Wt Readings from Last 4 Encounters:   03/10/22 122.5 kg (270 lb)   01/19/22 124.7 kg (275 lb)   01/11/22 125.6 kg (277 lb)   12/18/20 122.9 kg (271 lb)      Body mass index is 49.38 kg/m .      Assessment and Plan     Assessment: Israel is a 41 year old year old female who is 19 years s/p  Dung en Y Gastric Bypass with Dr. Dey at [facility]. She established care with us in 2018 after weight regain to 276 lbs and has developed borderline diabetes in the interim with A1c of 6.6% in January this year (6.4% back in 2018) and we recently started Ozempic therapy to complement her metabolic syndrome and to assist weight loss goals.  She reports today in follow up since starting the Ozempic that the medication is helpful and well tolerated. We'll continue on the 0.5mg dose and keep her there while traveling to [place] to get her daughter a surgery.  Multivitamin use is now twice daily w/ 3 days weekly iron supplementation to address her low ferritin levels on labs this year.  Her T3 levels were mildly low at 2.1 and we'll recheck those in the next 3 months to see if normalizing  with weight reduction/more mindful vitamin supplementation. TSH is still in normal range but if struggling, optimization of T3 and T4 would be warranted.            Encounter Diagnoses   Name Primary?     Borderline diabetes Yes     Low serum triiodothyronine (T3)      Low ferritin          Current Outpatient Medications:      Cholecalciferol (VITAMIN D) 2000 UNITS tablet, Take 1 tablet by mouth daily, Disp: , Rfl:      ferrous sulfate (IRON) 325 (65 FE) MG tablet, Take 1 tablet (325 mg) by mouth 2 times daily, Disp: 60 tablet, Rfl: 2     fluticasone (FLONASE) 50 MCG/ACT nasal spray, Spray 1 spray in nostril daily, Disp: 16 g, Rfl: 0     Multiple Vitamins-Minerals (MULTIVITAMIN PO), , Disp: , Rfl:      semaglutide (OZEMPIC) 2 MG/1.5ML SOPN pen, Start 0.25mg injected every 7 days (once weekly) for 4 weeks then increase, if tolerated, to 0.5mg injected every 7 days (once weekly)., Disp: 1.5 mL, Rfl: 1     VITAMIN B-12 PO, 1 TABLET ORALLY DAILY(HOME MED), Disp: , Rfl:     Plan:  1. Continue good bariatric supplementation. Choose a Equate complete multivtamin and use twice daily when you exhaust your prenatal supply. Continue MWF iron fumarate use. Consider taking the extra iron with a 100mg vitamin C to improve absorption. Continue B12, D3 and calcium.    2. Ozempic tolerated well at first dose of 0.5mg, continue on this dose until our June check up upon your return home from [place]. Around this time we'll repeat iron studies, A1c and T3 levels.  We may consider increasing dose in June if slow progress.    3. Continue good bariatric meals every 4-5 hours with 15-20g of lean protein per meal and perhaps supplementing when you're on the road regularly w/ protein drink like Premiere protein or Ensure Max Protein vs greek yogurt/cottage cheese or other grab and go portable protein sources.      No follow-ups on file.    Bariatric Surgery Review     Interim History/LifeChanges: just bumped ozempic up to 0.5mg this past  "week. Some tendency towards eustachian tube dysfunction and not sure if some intermittent vetigo sx this month.  CGM average 122mg/dl now  Will be in [place] until early June.   Patient Concerns: review meds. Med working well, feeling restriction better for the first time in \"very long time\". Using Bariatric method better. More protein focus is going well.   270 lbs.  Appetite (1-10): improved  GERD: no.    Reviewed whether any need/indication for screening EGD today and we will deferred.  Typically, a screening EGD is recommend post op year 2-3 if no symptoms to assess health of esophagus/bariatric surgery and sooner if difficult to control GERD or persistent pain/dysphagia sx despite behavior modification.    Medication changes: tolerating ozempic    Vitamin Intake:   B-12   yes, 500mcg she thinks.   MVI  twice daily now   Vitamin D  D3 5000IUs   Calcium   caltrate 600 w/ some D3, once daily.     Other  finishing her prenatal vitamins as her multivitamin and has backed off on iron extra supplementation to MWF              LABS: ordered      Most recent labs:  Lab Results   Component Value Date    WBC 9.2 01/11/2022    HGB 13.6 01/11/2022    HCT 41.0 01/11/2022    MCV 93 01/11/2022     01/11/2022     Lab Results   Component Value Date    CHOL 262 (H) 01/11/2022     Lab Results   Component Value Date    HDL 63 01/11/2022     No components found for: LDLCALC  Lab Results   Component Value Date    TRIG 191 (H) 01/11/2022     No components found for: CHOLHDL  Lab Results   Component Value Date    ALT 30 01/11/2022    AST 20 01/11/2022    ALKPHOS 104 01/11/2022     No results found for: HGBA1C  No components found for: [id number]  No components found for: [id number]  No components found for: FERRITIN  No components found for: PTH  No components found for: 81939  No components found for: 7597  Lab Results   Component Value Date    TSH 1.73 01/25/2022     No results found for: TESTOSTERONE        Social " History     Social History     Socioeconomic History     Marital status:      Spouse name: Not on file     Number of children: 1     Years of education: Not on file     Highest education level: Not on file   Occupational History     Occupation:    Tobacco Use     Smoking status: Former Smoker     Packs/day: 0.10     Years: 8.00     Pack years: 0.80     Types: Cigarettes     Quit date: 2008     Years since quittin.4     Smokeless tobacco: Never Used   Vaping Use     Vaping Use: Never used   Substance and Sexual Activity     Alcohol use: Yes     Comment: occasional     Drug use: No     Sexual activity: Yes     Partners: Male     Birth control/protection: None     Comment: NFP   Other Topics Concern     Parent/sibling w/ CABG, MI or angioplasty before 65F 55M? No   Social History Narrative     Not on file     Social Determinants of Health     Financial Resource Strain: Not on file   Food Insecurity: Not on file   Transportation Needs: Not on file   Physical Activity: Not on file   Stress: Not on file   Social Connections: Not on file   Intimate Partner Violence: Not on file   Housing Stability: Not on file       Past Medical History     Past Medical History:   Diagnosis Date     Abnormal glandular Papanicolaou smear of cervix     Negative colpo per patient     Chickenpox      Chronic maxillary sinusitis 3/31/2006     CTS (carpal tunnel syndrome) 3/6/2014     Situational anxiety 2012    Related to her cancer diagnosis     Problem List     Patient Active Problem List   Diagnosis     Obesity, Class III, BMI 40-49.9 (morbid obesity) (H)     CARDIOVASCULAR SCREENING; LDL GOAL LESS THAN 160     Malignant carcinoid tumor (H)     Morbid obesity, unspecified obesity type (H)     Hx of gastric bypass     Excessive bleeding in premenopausal period     White coat syndrome without diagnosis of hypertension     Nipple discharge in female     Postoperative malabsorption     Secondary  "hyperparathyroidism, non-renal (H)     Type 2 diabetes mellitus without complication, without long-term current use of insulin (H)     Hyperlipidemia LDL goal <70     Medications     [unfilled]  Surgical History     Past Surgical History  She has a past surgical history that includes gastric bypass (2003); tonsillectomy (age 7); Herniorrhaphy incisional (location) (8/19/2011); REMOVAL OF LUNG,BILOBECTOMY (Right, 11/22/2013); Bronchoscopy flexible (11/1/2013); Endobronchial ultrasound flexible (11/1/2013); Revision Dung-En-Y; hernia repair; Cholecystectomy; and Lung Lobectomy.    Objective-Exam     Constitutional:  Ht 1.575 m (5' 2\")   Wt 122.5 kg (270 lb)   BMI 49.38 kg/m    [unfilled]   General:  Pleasant and in no acute distress   Eyes:  EOMI  ENT:  Airway patent    Neck:  Respiratory: Normal respiratory effort, no cough.  CV:  n/a  Gastrointestinal: n/a  Musculoskeletal: muscle mass WNL    Psychiatric: alert and oriented X3, mood and affect normal    Counseling     We reviewed the important post op bariatric recommendations:  -eating 3 meals daily  -eating protein first, getting >60gm protein daily  -eating slowly, chewing food well  -avoiding/limiting calorie containing beverages  -drinking water 15-30 minutes before or after meals  -limiting restaurant or cafeteria eating to twice a week or less    We discussed the importance of restorative sleep and stress management in maintaining a healthy weight.  We discussed the National Weight Control Registry healthy weight maintenance strategies and ways to optimize metabolism.  We discussed the importance of physical activity including cardiovascular and strength training in maintaining a healthier weight.    We discussed the importance of life-long vitamin supplementation and life-long  follow-up.    Israel was reminded that, to avoid marginal ulcers she should avoid tobacco at all, alcohol in excess, caffeine in excess, and NSAIDS (unless indicated for " cardioprotection or othewise and opposed by a PPI).    Shon Chand MD    [facility].  3/10/2022  11:13 AM  [phone] (clinic phone)  [phone] (fax)    No images are attached to the encounter.  Medical Decision Makin minutes spent on the date of the encounter doing chart review, history and exam, documentation and further activities per the note      Video-Visit Details    Type of service:  Video Visit    Video End Time (time video stopped): 11:42 AM  Originating Location (pt. Location): Home    Distant Location (provider location):  [facility] SUR[facility] CLINIC [facility] BARIATRICS [facility]     Platform used for Video Visit: [employer]

## 2022-03-14 ENCOUNTER — TELEPHONE (OUTPATIENT)
Dept: FAMILY MEDICINE | Facility: CLINIC | Age: 42
End: 2022-03-14
Payer: COMMERCIAL

## 2022-03-14 NOTE — TELEPHONE ENCOUNTER
Patient Quality Outreach    Patient is due for the following:   Diabetes -  Eye Exam    NEXT STEPS:   Schedule a office visit for eye exam    Type of outreach:    Sent [employer] [employer] message.      Questions for provider review:    None     Nadege Adaem CMA

## 2022-04-07 ENCOUNTER — TRANSFERRED RECORDS (OUTPATIENT)
Dept: HEALTH INFORMATION MANAGEMENT | Facility: CLINIC | Age: 42
End: 2022-04-07
Payer: COMMERCIAL

## 2022-04-07 LAB
RETINOPATHY: NEGATIVE
RETINOPATHY: NEGATIVE

## 2022-04-08 ENCOUNTER — LAB (OUTPATIENT)
Dept: LAB | Facility: CLINIC | Age: 42
End: 2022-04-08
Payer: COMMERCIAL

## 2022-04-08 DIAGNOSIS — E11.9 TYPE 2 DIABETES MELLITUS WITHOUT COMPLICATION, WITHOUT LONG-TERM CURRENT USE OF INSULIN (H): Primary | ICD-10-CM

## 2022-04-08 DIAGNOSIS — E11.9 TYPE 2 DIABETES MELLITUS WITHOUT COMPLICATION, WITHOUT LONG-TERM CURRENT USE OF INSULIN (H): ICD-10-CM

## 2022-04-08 DIAGNOSIS — Z13.220 SCREENING FOR HYPERLIPIDEMIA: ICD-10-CM

## 2022-04-08 DIAGNOSIS — E66.01 OBESITY, CLASS III, BMI 40-49.9 (MORBID OBESITY) (H): ICD-10-CM

## 2022-04-08 LAB
CHOLEST SERPL-MCNC: 202 MG/DL
FASTING STATUS PATIENT QL REPORTED: YES
HBA1C MFR BLD: 5.7 % (ref 0–5.6)
HDLC SERPL-MCNC: 59 MG/DL
LDLC SERPL CALC-MCNC: 121 MG/DL
NONHDLC SERPL-MCNC: 143 MG/DL
TRIGL SERPL-MCNC: 111 MG/DL

## 2022-04-08 PROCEDURE — 80061 LIPID PANEL: CPT

## 2022-04-08 PROCEDURE — 36415 COLL VENOUS BLD VENIPUNCTURE: CPT

## 2022-04-08 PROCEDURE — 83036 HEMOGLOBIN GLYCOSYLATED A1C: CPT

## 2022-04-12 DIAGNOSIS — E11.9 TYPE 2 DIABETES MELLITUS WITHOUT COMPLICATION, WITHOUT LONG-TERM CURRENT USE OF INSULIN (H): Primary | ICD-10-CM

## 2022-04-13 DIAGNOSIS — E66.01 OBESITY, MORBID, BMI 50 OR HIGHER (H): ICD-10-CM

## 2022-04-13 DIAGNOSIS — R73.03 BORDERLINE DIABETES: ICD-10-CM

## 2022-04-19 ENCOUNTER — MYC MEDICAL ADVICE (OUTPATIENT)
Dept: SURGERY | Facility: CLINIC | Age: 42
End: 2022-04-19
Payer: COMMERCIAL

## 2022-04-19 NOTE — TELEPHONE ENCOUNTER
Called the patient and she is not quite sure what dose she got but there was supposed to be 1mg total left in her pen and she may have given herself that whole amount instead of just 0.5 mg weekly.  We discussed that she may just be a little more nauseated if she did indeed give a full 1 mg dose but she should be okay from a blood sugar standpoint.  She also has picked up her next 6 doses so she will have enough to travel with over the next several weeks but will call if she runs short by a week.    Yvette Barber RN

## 2022-05-23 ENCOUNTER — TELEPHONE (OUTPATIENT)
Dept: FAMILY MEDICINE | Facility: CLINIC | Age: 42
End: 2022-05-23
Payer: COMMERCIAL

## 2022-05-23 NOTE — TELEPHONE ENCOUNTER
Patient Quality Outreach    Patient is due for the following:   Diabetes -  A1C and Microalbumin    NEXT STEPS:   Schedule a office visit for a diabetes check    Type of outreach:    Phone, spoke to patient/parent. She is out of [place] until [place] 3rd. She states that she will call to schedule when she gets back.      Questions for provider review:    None     Selena Dallas, CMA

## 2022-05-25 DIAGNOSIS — E66.01 OBESITY, MORBID, BMI 50 OR HIGHER (H): ICD-10-CM

## 2022-05-25 DIAGNOSIS — R73.03 BORDERLINE DIABETES: ICD-10-CM

## 2022-06-09 ENCOUNTER — TELEPHONE (OUTPATIENT)
Dept: FAMILY MEDICINE | Facility: CLINIC | Age: 42
End: 2022-06-09
Payer: COMMERCIAL

## 2022-06-09 DIAGNOSIS — E11.9 TYPE 2 DIABETES MELLITUS WITHOUT COMPLICATION, WITHOUT LONG-TERM CURRENT USE OF INSULIN (H): Primary | ICD-10-CM

## 2022-06-09 NOTE — TELEPHONE ENCOUNTER
Patient Quality Outreach    Patient is due for the following:   Diabetes -  Microalbumin and BP Check    NEXT STEPS:   Schedule a office visit for blood pressure check, diabetes check    Type of outreach:    Phone, left message for patient/parent to call back.      Questions for provider review:    None     Selena Dallas, CMA

## 2022-06-10 NOTE — TELEPHONE ENCOUNTER
Pt returned call. States notified due for BP check & diabetes check.  States she had last A1C checked in April (5.7), down from 6.6 in Jan. States result note from lab 4/8 states to f/u in 6 mos. Pt questioning need for diabetic appt.  Pt states she is followed by Dr Chand-bariatric specialist & has appt next week.  Also asking if ok to take home BP's & send in for review.  Will route to provider for review.    Serenity Ivory RN

## 2022-06-13 NOTE — TELEPHONE ENCOUNTER
Called and spoke with patient. Helped her schedule an appointment with Judie to discuss diabetes diagnosis and plan moving forward.    Selena Dallas MA

## 2022-06-13 NOTE — TELEPHONE ENCOUNTER
Please let patient know I would recommend based on your upcoming appoint with bariatrics a lab only appointment within the next 1 to 2 months to recheck your hemoglobin A1c.  Does not need to be an in office visit but at that time we will check an A1c and micro albumin.        Judie Severino CNP

## 2022-06-14 ENCOUNTER — VIRTUAL VISIT (OUTPATIENT)
Dept: FAMILY MEDICINE | Facility: CLINIC | Age: 42
End: 2022-06-14
Payer: COMMERCIAL

## 2022-06-14 DIAGNOSIS — R73.03 PREDIABETES: Primary | ICD-10-CM

## 2022-06-14 PROBLEM — E11.9 TYPE 2 DIABETES MELLITUS WITHOUT COMPLICATION, WITHOUT LONG-TERM CURRENT USE OF INSULIN (H): Status: RESOLVED | Noted: 2022-01-11 | Resolved: 2022-06-14

## 2022-06-14 PROCEDURE — 99213 OFFICE O/P EST LOW 20 MIN: CPT | Mod: 95 | Performed by: NURSE PRACTITIONER

## 2022-06-14 NOTE — PROGRESS NOTES
Israel is a 41 year old who is being evaluated via a billable telephone visit.      What phone number would you like to be contacted at? [phone]  How would you like to obtain your AVS? Leo    Assessment & Plan     (R73.03) Prediabetes  (primary encounter diagnosis)  Comment: Patient did have an hemoglobin A1c 6.5 however has been managed with diet and is currently seeing bariatric clinic for further weight loss management with all other hemoglobin A1c is at 5.7 we will continue to keep patient in the diabetic category and should have annual checks of her A1c  Plan:   No follow-ups on file.    Judie Severino, GILDA [facility]    Subjective   Israel is a 41 year old who presents for the following health issues     HPI     Patient would like to discuss her diabetes diagnosis. She would like to discuss what her next steps are.         Review of Systems   Constitutional, HEENT, cardiovascular, pulmonary, gi and gu systems are negative, except as otherwise noted.      Objective       Vitals:  No vitals were obtained today due to virtual visit.    Physical Exam   healthy, alert and no distress  PSYCH: Alert and oriented times 3; coherent speech, normal   rate and volume, able to articulate logical thoughts, able   to abstract reason, no tangential thoughts, no hallucinations   or delusions  Her affect is normal  RESP: No cough, no audible wheezing, able to talk in full sentences  Remainder of exam unable to be completed due to telephone visits              Phone call duration: 15  minutes

## 2022-06-22 ENCOUNTER — VIRTUAL VISIT (OUTPATIENT)
Dept: SURGERY | Facility: CLINIC | Age: 42
End: 2022-06-22
Payer: COMMERCIAL

## 2022-06-22 VITALS — BODY MASS INDEX: 48.4 KG/M2 | WEIGHT: 263 LBS | HEIGHT: 62 IN

## 2022-06-22 DIAGNOSIS — R73.03 BORDERLINE DIABETES: ICD-10-CM

## 2022-06-22 DIAGNOSIS — Z98.84 HX OF GASTRIC BYPASS: Primary | ICD-10-CM

## 2022-06-22 DIAGNOSIS — E66.01 OBESITY, MORBID, BMI 50 OR HIGHER (H): ICD-10-CM

## 2022-06-22 PROCEDURE — 99214 OFFICE O/P EST MOD 30 MIN: CPT | Mod: 95 | Performed by: EMERGENCY MEDICINE

## 2022-06-22 NOTE — PROGRESS NOTES
Israel Zabala is 41 year old  female who presents for a billable video visit today.    How would you like to obtain your AVS? MyChart  If dropped from the video visit, the video invitation should be resent by: Send to e-mail at: [email or web address]  Will anyone else be joining your video visit? No      Video Start Time: 8:45am    Are there any specific questions or needs that you would like addressed at your visit today? See     Provider Notes: see my notes      Video-Visit Details    Type of service:  Video Visit    Video End Time (time video stopped): 9:13 AM  Originating Location (pt. Location): Home    Distant Location (provider location):  [facility] SURGERY CLINIC AND BARIATRICS [facility]     Platform used for Video Visit: [employer]

## 2022-06-22 NOTE — LETTER
6/22/2022         RE: Israel Zabala  [id number]  [place] [address]        Dear Colleague,    Thank you for referring your patient, Israel Zabala, to the [facility] SURGERY CLINIC AND BARIATRICS CARE [place]. Please see a copy of my visit note below.    Bariatric Follow Up Visit with a History of Previous Bariatric Surgery     Date of visit: 6/22/2022  Physician: Shon Chand MD, MD  Primary Care Provider:  Darcy Yang  Israel Zabala   41 year old  female    Date of Surgery: 2003  Initial Weight: 284 lbspreop  w/ establishmeht of careweight in 2018 at 276 lbs here.  Initial BMI: n/a  Today's Weight:   Wt Readings from Last 1 Encounters:   06/22/22 119.3 kg (263 lb)     Weight history:   Wt Readings from Last 4 Encounters:   06/22/22 119.3 kg (263 lb)   03/10/22 122.5 kg (270 lb)   01/19/22 124.7 kg (275 lb)   01/11/22 125.6 kg (277 lb)      Body mass index is 48.1 kg/m .      Assessment and Plan     Assessment: Israel is a 41 year old year old female who is 19 years s/p  Dung en Y Gastric Bypass with Dr. Dey.  She'd been started on Ozempic this year for some developing diabetes type II (A1c of 6.6% in January), 6,4% in 2018,   She's been dealing with a ill child and traveling for her child's surgery this year from which they've returned and she'll be heading back to work this week.     She was found to have borderline diabetes this winter with super morbid obesity and this metabolc syndrome puts her at very high risks for end organ damage and vascular/neoplastic risks, necessitating combined medical therapy to complement her bariatric surgery as her up trend over the past 3 years had pushed A1c to 6.6% in January from 6.4% in 2018.  Her A1c has greatly improved with  bariatric diet and semaglutide use dropping from 6.6% to 5.7%.  We'll plan to keep her on the 0.5mg dose of Ozempic through the Summer and look to possiblly increase to 1.0mg in the Fall if needed. Goal would be go  "get her weight under 230 lbs before considering coming off Ozempic and then reassessing her needs for her borderline diabetes therapy.         Encounter Diagnoses   Name Primary?     Borderline diabetes      Obesity, morbid, BMI 50 or higher (H)      Hx of gastric bypass Yes         Current Outpatient Medications:      Calcium Citrate-Vitamin D (CALCIUM CITRATE +D PO), Take 500 mg by mouth daily, Disp: , Rfl:      Cholecalciferol (VITAMIN D) 2000 UNITS tablet, Take 1 tablet by mouth daily, Disp: , Rfl:      ferrous sulfate (IRON) 325 (65 FE) MG tablet, Take 1 tablet (325 mg) by mouth 2 times daily, Disp: 60 tablet, Rfl: 2     Multiple Vitamins-Minerals (MULTIVITAMIN PO), , Disp: , Rfl:      semaglutide (OZEMPIC) 2 MG/1.5ML SOPN pen, 0.5mg injected every 7 days (once weekly)., Disp: 1.5 mL, Rfl: 2     VITAMIN B-12 PO, 1 TABLET ORALLY DAILY(HOME MED), Disp: , Rfl:      fluticasone (FLONASE) 50 MCG/ACT nasal spray, Spray 1 spray in nostril daily (Patient not taking: No sig reported), Disp: 16 g, Rfl: 0    Plan:  1. Continue Ozempic through the summer. With more focused bariatric method/protein first approach to meals, weight loss and ozempic are dropping A1c nicely. We'll plan to continue 0.5mg dose through the summer and re-evaluate needs.    2. Continue good vitamin use/therapy and we'll plan to recheck labs in December.    3. Follow up as desired with dietician this summer if struggling at all with work meals/appetite satisfaction.  No follow-ups on file.    Bariatric Surgery Review     Interim History/LifeChanges: back from [place], went down to 0.25mg dose of Ozempic up until this week    Patient Concerns: reviewing her labs, A1 much improved. Looking forward to [place] trip w/ girls.  Appetite (1-10):   GERD: n/a.        Medication changes: tolerating ozempic well.    Vitamin Intake:              LABS: \"Reviewed A1c  Down to 5.7% in April from 6.6% in January with the therpies/diet change provided.      Most " "recent labs:  Lab Results   Component Value Date    WBC 9.2 2022    HGB 13.6 2022    HCT 41.0 2022    MCV 93 2022     2022     Lab Results   Component Value Date    CHOL 202 (H) 2022     Lab Results   Component Value Date    HDL 59 2022     No components found for: LDLCALC  Lab Results   Component Value Date    TRIG 111 2022     No results found for: CHOLHDL  Lab Results   Component Value Date    ALT 30 2022    AST 20 2022    ALKPHOS 104 2022     No results found for: HGBA1C  No components found for: CSMAXGHO40  No components found for: OUDEAAIA51WY  No components found for: FERRITIN  No components found for: PTH  No components found for: 98258  No components found for: 7597  Lab Results   Component Value Date    TSH 1.73 2022     No results found for: TESTOSTERONE    Habits:    Exercise Routine: no regular hobbies, going back to work for first time in 9 weeks.   3 meals/day? yes  Protein 60-80g/day? yes  Water Separate from meals? yes  Calorie Containing Beverages: n/a  Restaurant eating/wk: n/a  Sleep Habits:  n/a  CPAP Use? n/a  Contraception: n/a  DEXA:n/a.  Discussed annual screening to start at age 45 and continue to age 55 if scoring \"low risk\". DEXA scan recommended at age 55 regardless as long as at least 2 years have transpired from their bariatric surgery.    Social History     Social History     Socioeconomic History     Marital status:      Spouse name: Not on file     Number of children: 1     Years of education: Not on file     Highest education level: Not on file   Occupational History     Occupation:    Tobacco Use     Smoking status: Former Smoker     Packs/day: 0.10     Years: 8.00     Pack years: 0.80     Types: Cigarettes     Quit date: 2008     Years since quittin.7     Smokeless tobacco: Never Used   Vaping Use     Vaping Use: Never used   Substance and Sexual Activity     Alcohol " "use: Yes     Comment: occasional     Drug use: No     Sexual activity: Yes     Partners: Male     Birth control/protection: None     Comment: NFP   Other Topics Concern     Parent/sibling w/ CABG, MI or angioplasty before 65F 55M? No   Social History Narrative     Not on file     Social Determinants of Health     Financial Resource Strain: Not on file   Food Insecurity: Not on file   Transportation Needs: Not on file   Physical Activity: Not on file   Stress: Not on file   Social Connections: Not on file   Intimate Partner Violence: Not on file   Housing Stability: Not on file       Past Medical History     Past Medical History:   Diagnosis Date     Abnormal glandular Papanicolaou smear of cervix 2002    Negative colpo per patient     Chickenpox      Chronic maxillary sinusitis 3/31/2006     CTS (carpal tunnel syndrome) 3/6/2014     Situational anxiety 12/13/2012    Related to her cancer diagnosis     Problem List     Patient Active Problem List   Diagnosis     Obesity, Class III, BMI 40-49.9 (morbid obesity) (H)     CARDIOVASCULAR SCREENING; LDL GOAL LESS THAN 160     Malignant carcinoid tumor (H)     Morbid obesity, unspecified obesity type (H)     Hx of gastric bypass     Excessive bleeding in premenopausal period     White coat syndrome without diagnosis of hypertension     Nipple discharge in female     Postoperative malabsorption     Secondary hyperparathyroidism, non-renal (H)     Hyperlipidemia LDL goal <70     Prediabetes     Medications     [unfilled]  Surgical History     Past Surgical History  She has a past surgical history that includes gastric bypass (2003); tonsillectomy (age 7); Herniorrhaphy incisional (location) (8/19/2011); REMOVAL OF LUNG,BILOBECTOMY (Right, 11/22/2013); Bronchoscopy flexible (11/1/2013); Endobronchial ultrasound flexible (11/1/2013); Revision Dung-En-Y; hernia repair; Cholecystectomy; and Lung Lobectomy.    Objective-Exam     Constitutional:  Ht 1.575 m (5' 2\")   Wt " 119.3 kg (263 lb)   BMI 48.10 kg/m    [unfilled]   General:  Pleasant and in no acute distress   Eyes:  EOMI  ENT:  Airway patent    Neck:  Respiratory: Normal respiratory effort, no cough, .  CV:  n/a  Gastrointestinal: n/a  Musculoskeletal: muscle mass WNL  Skin: color without pallor. Skin is tanned. hair thick,   Psychiatric: alert and oriented X3, mood and affect normal    Counseling     We reviewed the important post op bariatric recommendations:  -eating 3 meals daily  -eating protein first, getting >60gm protein daily  -eating slowly, chewing food well  -avoiding/limiting calorie containing beverages  -drinking water 15-30 minutes before or after meals  -limiting restaurant or cafeteria eating to twice a week or less    We discussed the importance of restorative sleep and stress management in maintaining a healthy weight.  We discussed the National Weight Control Registry healthy weight maintenance strategies and ways to optimize metabolism.  We discussed the importance of physical activity including cardiovascular and strength training in maintaining a healthier weight.    We discussed the importance of life-long vitamin supplementation and life-long  follow-up.    Israel was reminded that, to avoid marginal ulcers she should avoid tobacco at all, alcohol in excess, caffeine in excess, and NSAIDS (unless indicated for cardioprotection or othewise and opposed by a PPI).    Shon Chand MD    [facility].  2022  8:04 AM  [phone] (clinic phone)  [phone] (fax)    No images are attached to the encounter.  Medical Decision Makin minutes spent on the date of the encounter doing chart review, history and exam, documentation and further activities per the note    Israel Zabala is 41 year old  female who presents for a billable video visit today.    How would you like to obtain your AVS? MyChart  If dropped from the video visit, the video invitation should be resent  by: Send to e-mail at: [email or web address]  Will anyone else be joining your video visit? No      Video Start Time: 8:45am    Are there any specific questions or needs that you would like addressed at your visit today? See     Provider Notes: see my notes      Video-Visit Details    Type of service:  Video Visit    Video End Time (time video stopped): 9:13 AM  Originating Location (pt. Location): Home    Distant Location (provider location):  [facility] SURGERY [facility] AND BARIATRICS [facility]     Platform used for Video Visit: [email or web address]        Again, thank you for allowing me to participate in the care of your patient.        Sincerely,        Shon Chand MD

## 2022-06-22 NOTE — PROGRESS NOTES
Bariatric Follow Up Visit with a History of Previous Bariatric Surgery     Date of visit: 6/22/2022  Physician: Shon Chand MD, MD  Primary Care Provider:  Darcy Yang  Israel Zabala   41 year old  female    Date of Surgery: 2003  Initial Weight: 284 lbspreop  w/ establishmeht of careweight in 2018 at 276 lbs here.  Initial BMI: n/a  Today's Weight:   Wt Readings from Last 1 Encounters:   06/22/22 119.3 kg (263 lb)     Weight history:   Wt Readings from Last 4 Encounters:   06/22/22 119.3 kg (263 lb)   03/10/22 122.5 kg (270 lb)   01/19/22 124.7 kg (275 lb)   01/11/22 125.6 kg (277 lb)      Body mass index is 48.1 kg/m .      Assessment and Plan     Assessment: Israel is a 41 year old year old female who is 17 years s/p  Dung en Y Gastric Bypass with Dr. Dey.  She'd been started on Ozempic this year for some developing diabetes type II (A1c of 6.6% in January), 6,4% in 2018,   She's been dealing with a ill child and traveling for her child's surgery this year from which they've returned and she'll be heading back to work this week.     She was found to have borderline diabetes this winter with super morbid obesity and this metabolc syndrome puts her at very high risks for end organ damage and vascular/neoplastic risks, necessitating combined medical therapy to complement her bariatric surgery as her up trend over the past 3 years had pushed A1c to 6.6% in January from 6.4% in 2018.  Her A1c has greatly improved with  bariatric diet and semaglutide use dropping from 6.6% to 5.7%.  We'll plan to keep her on the 0.5mg dose of Ozempic through the Summer and look to possiblly increase to 1.0mg in the Fall if needed. Goal would be go get her weight under 230 lbs before considering coming off Ozempic and then reassessing her needs for her borderline diabetes therapy.         Encounter Diagnoses   Name Primary?     Borderline diabetes      Obesity, morbid, BMI 50 or higher (H)      Hx of gastric bypass Yes  "        Current Outpatient Medications:      Calcium Citrate-Vitamin D (CALCIUM CITRATE +D PO), Take 500 mg by mouth daily, Disp: , Rfl:      Cholecalciferol (VITAMIN D) 2000 UNITS tablet, Take 1 tablet by mouth daily, Disp: , Rfl:      ferrous sulfate (IRON) 325 (65 FE) MG tablet, Take 1 tablet (325 mg) by mouth 2 times daily, Disp: 60 tablet, Rfl: 2     Multiple Vitamins-Minerals (MULTIVITAMIN PO), , Disp: , Rfl:      semaglutide (OZEMPIC) 2 MG/1.5ML SOPN pen, 0.5mg injected every 7 days (once weekly)., Disp: 1.5 mL, Rfl: 2     VITAMIN B-12 PO, 1 TABLET ORALLY DAILY(HOME MED), Disp: , Rfl:      fluticasone (FLONASE) 50 MCG/ACT nasal spray, Spray 1 spray in nostril daily (Patient not taking: No sig reported), Disp: 16 g, Rfl: 0    Plan:  1. Continue Ozempic through the summer. With more focused bariatric method/protein first approach to meals, weight loss and ozempic are dropping A1c nicely. We'll plan to continue 0.5mg dose through the summer and re-evaluate needs.    2. Continue good vitamin use/therapy and we'll plan to recheck labs in December.    3. Follow up as desired with dietician this summer if struggling at all with work meals/appetite satisfaction.  No follow-ups on file.    Bariatric Surgery Review     Interim History/LifeChanges: back from [place], went down to 0.25mg dose of Ozempic up until this week    Patient Concerns: reviewing her labs, A1 much improved. Looking forward to [place] trip w/ girls.  Appetite (1-10):   GERD: n/a.        Medication changes: tolerating ozempic well.    Vitamin Intake:              LABS: \"Reviewed A1c  Down to 5.7% in April from 6.6% in January with the therpies/diet change provided.      Most recent labs:  Lab Results   Component Value Date    WBC 9.2 01/11/2022    HGB 13.6 01/11/2022    HCT 41.0 01/11/2022    MCV 93 01/11/2022     01/11/2022     Lab Results   Component Value Date    CHOL 202 (H) 04/08/2022     Lab Results   Component Value Date    HDL 59 " "2022     No components found for: LDLCALC  Lab Results   Component Value Date    TRIG 111 2022     No results found for: CHOLHDL  Lab Results   Component Value Date    ALT 30 2022    AST 20 2022    ALKPHOS 104 2022     No results found for: HGBA1C  No components found for: [id number]  No components found for: [id number]  No components found for: FERRITIN  No components found for: PTH  No components found for: 99674  No components found for: 7597  Lab Results   Component Value Date    TSH 1.73 2022     No results found for: TESTOSTERONE    Habits:    Exercise Routine: no regular hobbies, going back to work for first time in 9 weeks.   3 meals/day? yes  Protein 60-80g/day? yes  Water Separate from meals? yes  Calorie Containing Beverages: n/a  Restaurant eating/wk: n/a  Sleep Habits:  n/a  CPAP Use? n/a  Contraception: n/a  DEXA:n/a.  Discussed annual screening to start at age 45 and continue to age 55 if scoring \"low risk\". DEXA scan recommended at age 55 regardless as long as at least 2 years have transpired from their bariatric surgery.    Social History     Social History     Socioeconomic History     Marital status:      Spouse name: Not on file     Number of children: 1     Years of education: Not on file     Highest education level: Not on file   Occupational History     Occupation:    Tobacco Use     Smoking status: Former Smoker     Packs/day: 0.10     Years: 8.00     Pack years: 0.80     Types: Cigarettes     Quit date: 2008     Years since quittin.7     Smokeless tobacco: Never Used   Vaping Use     Vaping Use: Never used   Substance and Sexual Activity     Alcohol use: Yes     Comment: occasional     Drug use: No     Sexual activity: Yes     Partners: Male     Birth control/protection: None     Comment: NFP   Other Topics Concern     Parent/sibling w/ CABG, MI or angioplasty before 65F 55M? No   Social History Narrative     Not on file " "    Social Determinants of Health     Financial Resource Strain: Not on file   Food Insecurity: Not on file   Transportation Needs: Not on file   Physical Activity: Not on file   Stress: Not on file   Social Connections: Not on file   Intimate Partner Violence: Not on file   Housing Stability: Not on file       Past Medical History     Past Medical History:   Diagnosis Date     Abnormal glandular Papanicolaou smear of cervix 2002    Negative colpo per patient     Chickenpox      Chronic maxillary sinusitis 3/31/2006     CTS (carpal tunnel syndrome) 3/6/2014     Situational anxiety 12/13/2012    Related to her cancer diagnosis     Problem List     Patient Active Problem List   Diagnosis     Obesity, Class III, BMI 40-49.9 (morbid obesity) (H)     CARDIOVASCULAR SCREENING; LDL GOAL LESS THAN 160     Malignant carcinoid tumor (H)     Morbid obesity, unspecified obesity type (H)     Hx of gastric bypass     Excessive bleeding in premenopausal period     White coat syndrome without diagnosis of hypertension     Nipple discharge in female     Postoperative malabsorption     Secondary hyperparathyroidism, non-renal (H)     Hyperlipidemia LDL goal <70     Prediabetes     Medications     [unfilled]  Surgical History     Past Surgical History  She has a past surgical history that includes gastric bypass (2003); tonsillectomy (age 7); Herniorrhaphy incisional (location) (8/19/2011); REMOVAL OF LUNG,BILOBECTOMY (Right, 11/22/2013); Bronchoscopy flexible (11/1/2013); Endobronchial ultrasound flexible (11/1/2013); Revision Dung-En-Y; hernia repair; Cholecystectomy; and Lung Lobectomy.    Objective-Exam     Constitutional:  Ht 1.575 m (5' 2\")   Wt 119.3 kg (263 lb)   BMI 48.10 kg/m    [unfilled]   General:  Pleasant and in no acute distress   Eyes:  EOMI  ENT:  Airway patent    Neck:  Respiratory: Normal respiratory effort, no cough, .  CV:  n/a  Gastrointestinal: n/a  Musculoskeletal: muscle mass WNL  Skin: color without " pallor. Skin is tanned. hair thick,   Psychiatric: alert and oriented X3, mood and affect normal    Counseling     We reviewed the important post op bariatric recommendations:  -eating 3 meals daily  -eating protein first, getting >60gm protein daily  -eating slowly, chewing food well  -avoiding/limiting calorie containing beverages  -drinking water 15-30 minutes before or after meals  -limiting restaurant or cafeteria eating to twice a week or less    We discussed the importance of restorative sleep and stress management in maintaining a healthy weight.  We discussed the National Weight Control Registry healthy weight maintenance strategies and ways to optimize metabolism.  We discussed the importance of physical activity including cardiovascular and strength training in maintaining a healthier weight.    We discussed the importance of life-long vitamin supplementation and life-long  follow-up.    Israel was reminded that, to avoid marginal ulcers she should avoid tobacco at all, alcohol in excess, caffeine in excess, and NSAIDS (unless indicated for cardioprotection or othewise and opposed by a PPI).    Shon Chand MD    [facility].  2022  8:04 AM  [phone] (clinic phone)  [phone] (fax)    No images are attached to the encounter.  Medical Decision Makin minutes spent on the date of the encounter doing chart review, history and exam, documentation and further activities per the note

## 2022-07-27 ENCOUNTER — E-VISIT (OUTPATIENT)
Dept: FAMILY MEDICINE | Facility: CLINIC | Age: 42
End: 2022-07-27
Payer: COMMERCIAL

## 2022-07-27 DIAGNOSIS — B35.4 TINEA CORPORIS: Primary | ICD-10-CM

## 2022-07-27 PROCEDURE — 99421 OL DIG E/M SVC 5-10 MIN: CPT | Performed by: NURSE PRACTITIONER

## 2022-07-28 ENCOUNTER — OFFICE VISIT (OUTPATIENT)
Dept: FAMILY MEDICINE | Facility: CLINIC | Age: 42
End: 2022-07-28
Payer: COMMERCIAL

## 2022-07-28 ENCOUNTER — MYC MEDICAL ADVICE (OUTPATIENT)
Dept: FAMILY MEDICINE | Facility: CLINIC | Age: 42
End: 2022-07-28

## 2022-07-28 VITALS
SYSTOLIC BLOOD PRESSURE: 136 MMHG | DIASTOLIC BLOOD PRESSURE: 80 MMHG | HEART RATE: 76 BPM | TEMPERATURE: 98.2 F | WEIGHT: 258 LBS | BODY MASS INDEX: 47.48 KG/M2 | HEIGHT: 62 IN

## 2022-07-28 DIAGNOSIS — R73.03 PREDIABETES: Primary | ICD-10-CM

## 2022-07-28 DIAGNOSIS — B02.9 HERPES ZOSTER WITHOUT COMPLICATION: ICD-10-CM

## 2022-07-28 DIAGNOSIS — L23.7 CONTACT DERMATITIS DUE TO POISON IVY: ICD-10-CM

## 2022-07-28 LAB
CREAT UR-MCNC: 52 MG/DL
MICROALBUMIN UR-MCNC: <5 MG/L
MICROALBUMIN/CREAT UR: NORMAL MG/G{CREAT}

## 2022-07-28 PROCEDURE — 82043 UR ALBUMIN QUANTITATIVE: CPT | Performed by: NURSE PRACTITIONER

## 2022-07-28 PROCEDURE — 99213 OFFICE O/P EST LOW 20 MIN: CPT | Performed by: NURSE PRACTITIONER

## 2022-07-28 RX ORDER — VALACYCLOVIR HYDROCHLORIDE 1 G/1
1000 TABLET, FILM COATED ORAL 3 TIMES DAILY
Qty: 21 TABLET | Refills: 0 | Status: SHIPPED | OUTPATIENT
Start: 2022-07-28 | End: 2023-01-19

## 2022-07-28 RX ORDER — TRIAMCINOLONE ACETONIDE 1 MG/G
CREAM TOPICAL 2 TIMES DAILY
Qty: 45 G | Refills: 0 | Status: SHIPPED | OUTPATIENT
Start: 2022-07-28 | End: 2023-01-19

## 2022-07-28 RX ORDER — CLOTRIMAZOLE 1 %
CREAM (GRAM) TOPICAL 2 TIMES DAILY
Qty: 45 G | Refills: 0 | Status: SHIPPED | OUTPATIENT
Start: 2022-07-28 | End: 2023-01-19

## 2022-07-28 NOTE — PROGRESS NOTES
"  Assessment & Plan     (R73.03) Prediabetes  (primary encounter diagnosis)  Comment:   Plan: Albumin Random Urine Quantitative with Creat         Ratio            (B02.9) Herpes zoster without complication  Comment:   Plan: valACYclovir (VALTREX) 1000 mg tablet            (L23.7) Contact dermatitis due to poison ivy  Comment:   Plan: triamcinolone (KENALOG) 0.1 % external cream           BMI:   Estimated body mass index is 47.19 kg/m  as calculated from the following:    Height as of this encounter: 1.575 m (5' 2\").    Weight as of this encounter: 117 kg (258 lb).   Weight management plan: Discussed healthy diet and exercise guidelines    See Patient Instructions    No follow-ups on file.    GILDA Garay CNP  [facility]    Wendy Wood is a 41 year old, presenting for the following health issues:  No chief complaint on file.      History of Present Illness       Reason for visit:  Shingles  Symptom onset:  1-3 days ago    She eats 2-3 servings of fruits and vegetables daily.She consumes 0 sweetened beverage(s) daily.She exercises with enough effort to increase her heart rate 30 to 60 minutes per day.  She exercises with enough effort to increase her heart rate 3 or less days per week.   She is taking medications regularly.       Rash  Onset/Duration: 3 datys  Description  Location: left abdomen, left buttock  Character: red, painful, bumps  Itching: moderate  Intensity:  moderate  Progression of Symptoms:  worsening  Accompanying signs and symptoms:   Fever: No  Body aches or joint pain: No  Sore throat symptoms: No  Recent cold symptoms: No  History:           Previous episodes of similar rash: None  New exposures:  None  Recent travel: No  Exposure to similar rash: No  Precipitating or alleviating factors: none  Therapies tried and outcome: otc lotrimin, triple antibiotic         Review of Systems   Constitutional, HEENT, cardiovascular, pulmonary, gi and gu systems are " "negative, except as otherwise noted.      Objective    /80 (BP Location: Right arm, Cuff Size: Adult Large)   Pulse 76   Temp 98.2  F (36.8  C) (Tympanic)   Ht 1.575 m (5' 2\")   Wt 117 kg (258 lb)   BMI 47.19 kg/m    There is no height or weight on file to calculate BMI.  Physical Exam   GENERAL: healthy, alert and no distress  EYES: Eyes grossly normal to inspection, PERRL and conjunctivae and sclerae normal  RESP: lungs clear to auscultation - no rales, rhonchi or wheezes  CV: regular rate and rhythm, normal S1 S2, no S3 or S4, no murmur, click or rub, no peripheral edema and peripheral pulses strong  MS: no gross musculoskeletal defects noted, no edema  SKIN:Examination of the rash to back and lower leg reveals: Shingles:  Inflamed patches of clear fluid-filled vesicles located along a dermatome.  PSYCH: mentation appears normal, affect normal/bright    No results found for any visits on 07/28/22.                .  ..  "

## 2022-08-07 ENCOUNTER — HEALTH MAINTENANCE LETTER (OUTPATIENT)
Age: 42
End: 2022-08-07

## 2022-09-14 ENCOUNTER — VIRTUAL VISIT (OUTPATIENT)
Dept: SURGERY | Facility: CLINIC | Age: 42
End: 2022-09-14
Payer: COMMERCIAL

## 2022-09-14 VITALS — BODY MASS INDEX: 46.93 KG/M2 | HEIGHT: 62 IN | WEIGHT: 255 LBS

## 2022-09-14 DIAGNOSIS — E66.813 CLASS 3 SEVERE OBESITY DUE TO EXCESS CALORIES WITH SERIOUS COMORBIDITY AND BODY MASS INDEX (BMI) OF 45.0 TO 49.9 IN ADULT (H): Primary | ICD-10-CM

## 2022-09-14 DIAGNOSIS — Z98.84 HX OF GASTRIC BYPASS: ICD-10-CM

## 2022-09-14 DIAGNOSIS — R73.03 BORDERLINE DIABETES: ICD-10-CM

## 2022-09-14 DIAGNOSIS — K91.2 POSTOPERATIVE MALABSORPTION: ICD-10-CM

## 2022-09-14 DIAGNOSIS — E66.01 CLASS 3 SEVERE OBESITY DUE TO EXCESS CALORIES WITH SERIOUS COMORBIDITY AND BODY MASS INDEX (BMI) OF 45.0 TO 49.9 IN ADULT (H): Primary | ICD-10-CM

## 2022-09-14 PROCEDURE — 99214 OFFICE O/P EST MOD 30 MIN: CPT | Mod: 95 | Performed by: EMERGENCY MEDICINE

## 2022-09-14 NOTE — PATIENT INSTRUCTIONS
Plan:  1. Continue good bariatric methods of protein first at meals, chewing thoroughly and  water from meals.  2. Ozempic 0.5mg/week tolerated well. You could consider injections in the evenings to see if the mild nausea is less noticeable after injection.  Stop using if severe abdominal pain/nausea/vomiting/rashes at injection site and let me know. Refills for the next 6-9 months w/ goal of coming off as weight continues to come down. Recheck A1c this month and we'll repeat full nutritional labs at our next visit in January.  3. Continue staying active and making time for yourself to de-stress. Exercise/walking/strength work that allows for 150minutes weekly is a good goal for general good health/organ function.  4. Consider adding in mid morning snack if protein drink is your only breakfast food.   5. Recheck with me in 4 months. If any major schedule shifts or struggling with diet, feel free to check in with our dietician.        MEDICATIONS FOR WEIGHT LOSS  There are several medications available to assist us in weight loss.  By themselves, without compliance to a change in diet and increase in movement/activity these medications are disappointing in their results. However, combined with a closely monitored program of diet change and exercise they can be very effective in controlling appetite and boosting initial weight loss.  All weight loss medications need continual re-evaluation for efficacy as their side effects and health benefits fail to be worthwhile if a person is not continuing to lose weight or in maintaining their healthy weight.  Some weight loss medications are scheduled drugs, meaning there is at least a theoretical possibility for developing addiction to them but in practice this is rare.  We do anticipate coming off meds in the future after stabilization of weight loss is assurred.  Finally, a tolerance can develop and people s perceived efficacy of medication can diminish.  In  communication with your physician, it may be appropriate to intermittently take a break from these medications and then restart again (few weeks off then restart again) if a plateau is reached that cannot be broken through.  Each person can respond to a medication differently and to be a good option for you, it will need to be affordable, effective and well tolerated with minimal side effects.    In most cases, weight loss progress after one month and three months will be obtained and if a patient is not reaching the satisfactory progress towards weight loss, the medications may be discontinued.  The thought is that if a person is taking a weight loss medication and not receiving the potential health benefit of that drug, the side effects are not worthwhile and use should be discontinued.  On the flip side, there are many people on some weight loss medications for years because it continues to be an effective tool in their weight management and they are tolerating the medication without any long-term side effects.  Each person's response and purpose will be evaluated.      PHENTERMINE (Adipex): approved in 1959 for appetite suppression.  It has stimulant effects and cannot be used with Ritalin, Concerta, or other stimulants.  Although it is not highly addictive, it's chemically related to amphetamines which are addictive and is classified as a Controlled Substance by the [place].  Occasional dependence can develop, but rarely. The most common side effects are dry mouth, increased energy and concentration, increased pulse, and constipation.  You should not take phentermine if you have glaucoma, hyperthyroidism, or uncontrolled/untreated hypertension or overly anxious. You should stop if dramatic mood swings, severe insomnia, palpations, chest pains, visual changes or if your Blood Pressure is consistently elevated or any time it's over 160/90.   It's ok to go off the med for a few weeks and restart if efficacy is wearing  off.  $24-$30 for 90 tablets at [facility]. Females are required to have reliable birth control to reduce the risk birth defects/miscarriage.      TOPIRAMATE (Topamax): Anti-seizure medication, also used to prevent migraines and sometimes for mood stabilization.  Side effects include paresthesia, glaucoma, altered concentration, attention difficulties, memory and speech problems, metabolic acidosis, depression, increase in body temperature and decrease sweating, risk of kidney stones.  Do not take Topamax while taking Depakote as this can cause high ammonia levels.  You must have reliable birth control as Topamax can cause birth defects.  If prolonged use has occurred it should be tapered off slowly to avoid withdrawal issues.  Insurance usually covers Topiramate.  At higher doses, there may be some confusion/forgetfulness associated with this so we try to limit dose to under 75mg twice daily to reduce this risk. Often covered by insurance as it's used for many reasons.  Topamax will cause carbonated beverages to taste bad. A recheck of your kidney/electrolytes may occur within a few months of starting.    QSYMIA (Phentermine + Topamax):  See above information about phentermine and Topamax.  Most common side effects are paresthesia, dizziness, distortion of taste, insomnia, constipation, and dry mouth.  See above descriptions for the two individual agents.Females are required to have reliable birth control to reduce the risk birth defects/miscarriage.  $150-$220 per month      GLP1 Agonists:  Liraglutide (Victoza/Saxenda), Semaglutide (Ozempic/Wegovy):   Part of the family of Glucagon Like Peptide Agonists, these medications directly suppresses appetite and are often used by diabetic patients due to improvements in glucose/insulin balance.  They also slow how quickly the stomach empties, increasing fullness. They may be hard to get covered for non diabetics and some plans have exclusions for weight loss  "purposes.  Currently, these are  injectable medications delivered via autoinjector pen. It can be very costly without insurance coverage (over $500/month).  Small risks for pancreatitis exists and dose should be held if increased mid abdominal pain/burning. It is not to be used if previous Multiple Endocrine Neoplasia. In rodents, may increase risk of thyroid tumors and not indicated for anyone with a history of medullary thyroid cancer as a result.  If changes in voice/swallowing should be discontinued. Reliable birth control required in women. [email or web address], [email or web address].[email or web address] has more information on these medications.    Contrave (Bupropion/Naltrexone).    Synergistic combination of a mild appetite suppressing anti-depressant (Bupropion) whose effects are increased due to interaction with Naltrexone.  Naltrexone may have some effects on craving and is often used in addiction medicine to help previous opiate addicts be less prone to relapse as it blocks the action of opiates. Should be stopped if any need for opiate pain medication, surgery or planned procedures where you'll be given sedation/anesthesia. If prolonged use, recommend stepping down bupropion over 2-3 weeks to limit any risk of withdrawal issues. Side effects may include dry mouth, increased heart rate, mild elevation in Blood pressure;  dizziness, ringing in the ears, anxiety (typically due to bupropion), nausea, constipation, and some get fatigued with naltrexone.  About $210 on [facility] for 120 tabs of \"Contrave\", the brand name without insurance coverage. Generic Bupropion 75mg: $25 for 120 tabs, Naltrexone: $55 for 90 tabs without insurance coverage on [employer]. Cannot be used if pregnant/trying to conceive or breast feeding.      Plenity:   Recently available October 2020, by mail order pharmacy only, but expensive. $98/month.  2-3 capsules taken 20 minutes before 2 meals daily provides crystals that expand into a gel that provides a mechanical fullness. " "Gel mixes with your next meal and increases satisfaction by bulking the meal up to feel bigger than it truly is. Plenity is not absorbed and gets passed through the digestive tract and excreted in stools. May cause some bloating/gas/full feeling as it behaves like a fiber in many ways. Cost not available yet. FDA cleared in March of 2019 but not available in stores as of March of 2020. Clearance safety and efficacy data done under \"Gelesis\" name. Not appropriate for people with stomach or bowel motility issues as requires you to pass it through digestive tract. [email or web address] has more information.        LEAN PROTEIN SOURCES  Getting 20-30 grams of protein, 3 meals daily, is appropriate for most people, some need more but more than about 40 grams per meal is not useful.  General rule is drinking one ounce of water per gram of protein eaten over the course of the day:  70 grams of protein each day, drink 70 oz of water.  Protein Source Portion Calories Grams of Protein                           Nonfat, plain Greek yogurt    (10 grams sugar or less) 3/4 cup (6 oz)  12-17   Light Yogurt (10 grams sugar or less) 3/4 cup (6 oz)  6-8   Protein Shake 1 shake 110-180 15-30   Skim/1% Milk or lactose-free milk 1 cup ( 8 oz)  8   Plain or light, flavored soymilk 1 cup  7-8   Plain or light, hemp milk 1 cup 110 6   Fat Free or 1% Cottage Cheese 1/2 cup 90 15   Part skim ricotta cheese 1/2 cup 100 14   Part skim or reduced fat cheese slices 1 ounce 65-80 8     Mozzarella String Cheese 1 80 8   Canned tuna, chicken, crab or salmon  (canned in water)  1/2 cup 100 15-20   White fish (broiled, grilled, baked) 3 ounces 100 21   [place]/Tuna (broiled, grilled, baked) 3 ounces 150-180 21   Shrimp, Scallops, Lobster, Crab 3 ounces 100 21   Pork loin, Pork Tenderloin 3 ounces 150 21   Boneless, skinless chicken /turkey breast                          (broiled, grilled, baked) 3 ounces 120 21   [place], [place], [place], " and Venison 3 ounces 120 21   Lean cuts of red meat and pork (sirloin,   round, tenderloin, flank, ground 93%-96%) 3 ounces 170 21   Lean or Extra Lean Ground Turkey 1/2 cup 150 20   90-95% Lean [place] Burger 1 chong 140-180 21   Low-fat casserole with lean meat 3/4 cup 200 17   Luncheon Meats                                                        (turkey, lean ham, roast beef, chicken) 3 ounces 100 21   Egg (boiled, poached, scrambled) 1 Egg 60 7   Egg Substitute 1/2 cup 70 10   Nuts (limit to 1 serving per day)  3 Tbsp. 150 7   Nut [place] (peanut, almond)  Limit to 1 serving or less daily 1 Tbsp. 90 4   Soy Burger (varies) 1  15   Garbanzo, Black, [place] Beans 1/2 cup 110 7   Refried Beans 1/2 cup 100 7   Kidney and Lima beans 1/2 cup 110 7   Tempeh 3 oz 175 18   Vegan crumbles 1/2 cup 100 14   Tofu 1/2 cup 110 14   Chili (beans and extra lean beef or turkey) 1 cup 200 23   Lentil Stew/Soup 1 cup 150 12   Black Bean Soup 1 cup 175 12

## 2022-09-14 NOTE — PROGRESS NOTES
Israel Zabala is 41 year old  female who presents for a billable video visit today.    How would you like to obtain your AVS? MyChart  If dropped from the video visit, the video invitation should be resent by: Text to cell phone: [phone]  Will anyone else be joining your video visit? No      Video Start Time: 8:02 AM    Are there any specific questions or needs that you would like addressed at your visit today? Follow up          Video-Visit Details    Type of service:  Video Visit    Video End Time (time video stopped): 8:23 AM  Originating Location (pt. Location): Home    Distant Location (provider location):  [facility] C[facility] [place]     Platform used for Video Visit: [facility]  Bariatric Follow Up Visit with a History of Previous Bariatric Surgery     Date of visit: 9/14/2022  Physician: Shon Chand MD, MD  Primary Care Provider:  Darcy Yang  Israel Zabala   41 year old  female    Date of Surgery: 2003  Initial Weight: 284 lbspreop  w/ establishmeht of care weight in 2018 at 276 lbs here  Today's Weight:   Wt Readings from Last 1 Encounters:   09/14/22 115.7 kg (255 lb)     Weight history:   Wt Readings from Last 4 Encounters:   09/14/22 115.7 kg (255 lb)   07/28/22 117 kg (258 lb)   06/22/22 119.3 kg (263 lb)   03/10/22 122.5 kg (270 lb)      Body mass index is 46.64 kg/m .      Assessment and Plan     Assessment: Israel is a 41 year old year old female who is 19 years s/p  Dung en Y Gastric Bypass with Dr. Dey at [facility].  She had initiated combined therapy for weight related health and evolving diabetes this past year with good success at our last visit w/ reduction in A1c at the 0.5mg/week dose of Ozempic, she's due for recheck of A1c.  Weight is continuing to come down slowly over the course of the summer, 8 lbs less vs June.  She's tolerating the 0.5mg/week dose well now and feels comfortable continuing on this dose for now. I'll refill 6  months worth and we'll recheck progress and annual Bariatric visit topics at our next check up in 4 months.        Encounter Diagnoses   Name Primary?     Borderline diabetes      Postoperative malabsorption      Hx of gastric bypass      Class 3 severe obesity due to excess calories with serious comorbidity and body mass index (BMI) of 45.0 to 49.9 in adult (H) Yes         Current Outpatient Medications:      Calcium Citrate-Vitamin D (CALCIUM CITRATE +D PO), Take 500 mg by mouth daily, Disp: , Rfl:      Cholecalciferol (VITAMIN D) 2000 UNITS tablet, Take 1 tablet by mouth daily, Disp: , Rfl:      clotrimazole (LOTRIMIN) 1 % external cream, Apply topically 2 times daily, Disp: 45 g, Rfl: 0     ferrous sulfate (IRON) 325 (65 FE) MG tablet, Take 1 tablet (325 mg) by mouth 2 times daily, Disp: 60 tablet, Rfl: 2     fluticasone (FLONASE) 50 MCG/ACT nasal spray, Spray 1 spray in nostril daily, Disp: 16 g, Rfl: 0     Multiple Vitamins-Minerals (MULTIVITAMIN PO), , Disp: , Rfl:      semaglutide (OZEMPIC) 2 MG/1.5ML SOPN pen, 0.5mg injected every 7 days (once weekly)., Disp: 4.5 mL, Rfl: 2     triamcinolone (KENALOG) 0.1 % external cream, Apply topically 2 times daily, Disp: 45 g, Rfl: 0     VITAMIN B-12 PO, 1 TABLET ORALLY DAILY(HOME MED), Disp: , Rfl:      valACYclovir (VALTREX) 1000 mg tablet, Take 1 tablet (1,000 mg) by mouth 3 times daily for 7 days, Disp: 21 tablet, Rfl: 0    Plan:  1. Continue good bariatric methods of protein first at meals, chewing thoroughly and  water from meals.  2. Ozempic 0.5mg/week tolerated well. You could consider injections in the evenings to see if the mild nausea is less noticeable after injection.  Stop using if severe abdominal pain/nausea/vomiting/rashes at injection site and let me know. Refills for the next 6-9 months w/ goal of coming off as weight continues to come down. Recheck A1c this month and we'll repeat full nutritional labs at our next visit in January.  3.  Continue staying active and making time for yourself to de-stress. Exercise/walking/strength work that allows for 150minutes weekly is a good goal for general good health/organ function.  4. Consider adding in mid morning snack if protein drink is your only breakfast food.   5. Recheck with me in 4 months. If any major schedule shifts or struggling with diet, feel free to check in with our dietician.    No follow-ups on file.    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   Gets to take a brake, sit and have meals. Will prepare her meals ahead of time and prepares on her last day off for the last 5 days to reduce urge to order out. Has control now for the first time in a long time. Has a few extra snacks.   Breakfast is protein drink. Lunch and dinner is lunchables (turkey stick/meat leftover, with fruit/veggie. Spinach/cottage cheese/cukes.  Dinner is left overs.    Ozempic is tolerated well at the 0.5mg on Tuesdays. occ nausea.  Feels appetite better control    Patient Concerns: none.  Appetite (1-10): well controlled now.   GERD: no.        Medication changes: none.    Vitamin Intake:   B-12   yes   MVI  yes   Vitamin D  yes   Calcium   yes     Other  n/a              LABS: ordered recheck of A1c, will plan to repeat nutritional evaluations in new year.          Most recent labs:  Lab Results   Component Value Date    WBC 9.2 01/11/2022    HGB 13.6 01/11/2022    HCT 41.0 01/11/2022    MCV 93 01/11/2022     01/11/2022     Lab Results   Component Value Date    CHOL 202 (H) 04/08/2022     Lab Results   Component Value Date    HDL 59 04/08/2022     No components found for: LDLCALC  Lab Results   Component Value Date    TRIG 111 04/08/2022     No results found for: CHOLHDL  Lab Results   Component Value Date    ALT 30 01/11/2022    AST 20 01/11/2022    ALKPHOS 104 01/11/2022     No results found for:  HGBA1C  Lab Results   Component Value Date    B12 917 (H) 2018     No components found for: VITDT1  Lab Results   Component Value Date    KETAN 9 (L) 2022     Lab Results   Component Value Date    PTHI 37 2022     Lab Results   Component Value Date    ZN 65.6 2022     Lab Results   Component Value Date    VIB1WB 167 2022     Lab Results   Component Value Date    TSH 1.73 2022     No results found for: TEST    Habits:        Social History     Social History     Socioeconomic History     Marital status:      Spouse name: Not on file     Number of children: 1     Years of education: Not on file     Highest education level: Not on file   Occupational History     Occupation:    Tobacco Use     Smoking status: Former Smoker     Packs/day: 0.10     Years: 8.00     Pack years: 0.80     Types: Cigarettes     Quit date: 2008     Years since quittin.9     Smokeless tobacco: Never Used   Vaping Use     Vaping Use: Never used   Substance and Sexual Activity     Alcohol use: Yes     Comment: occasional     Drug use: No     Sexual activity: Yes     Partners: Male     Birth control/protection: None     Comment: NFP   Other Topics Concern     Parent/sibling w/ CABG, MI or angioplasty before 65F 55M? No   Social History Narrative     Not on file     Social Determinants of Health     Financial Resource Strain: Not on file   Food Insecurity: Not on file   Transportation Needs: Not on file   Physical Activity: Not on file   Stress: Not on file   Social Connections: Not on file   Intimate Partner Violence: Not on file   Housing Stability: Not on file       Past Medical History     Past Medical History:   Diagnosis Date     Abnormal glandular Papanicolaou smear of cervix     Negative colpo per patient     Chickenpox      Chronic maxillary sinusitis 3/31/2006     CTS (carpal tunnel syndrome) 3/6/2014     Situational anxiety 2012    Related to her cancer  "diagnosis     Problem List     Patient Active Problem List   Diagnosis     Obesity, Class III, BMI 40-49.9 (morbid obesity) (H)     CARDIOVASCULAR SCREENING; LDL GOAL LESS THAN 160     Malignant carcinoid tumor (H)     Morbid obesity, unspecified obesity type (H)     Hx of gastric bypass     Excessive bleeding in premenopausal period     White coat syndrome without diagnosis of hypertension     Nipple discharge in female     Postoperative malabsorption     Secondary hyperparathyroidism, non-renal (H)     Hyperlipidemia LDL goal <70     Prediabetes     Medications     [unfilled]  Surgical History     Past Surgical History  She has a past surgical history that includes gastric bypass (2003); tonsillectomy (age 7); Herniorrhaphy incisional (location) (8/19/2011); REMOVAL OF LUNG,BILOBECTOMY (Right, 11/22/2013); Bronchoscopy flexible (11/1/2013); Endobronchial ultrasound flexible (11/1/2013); Revision Dung-En-Y; hernia repair; Cholecystectomy; and Lung Lobectomy.    Objective-Exam     Constitutional:  Ht 1.575 m (5' 2\")   Wt 115.7 kg (255 lb)   BMI 46.64 kg/m    [unfilled]   General:  Pleasant and in no acute distress   Eyes:  EOMI  ENT:  Airway patent, normal voice    Neck:  Respiratory: Normal respiratory effort, no cough, .  CV:  n/a  Gastrointestinal: n/a  Musculoskeletal: muscle mass WNL  Skin: color without pallor, tanned hair thick,   Psychiatric: alert and oriented X3, mood and affect normal    Counseling     We reviewed the important post op bariatric recommendations:  -eating 3 meals daily  -eating protein first, getting >60gm protein daily  -eating slowly, chewing food well  -avoiding/limiting calorie containing beverages  -drinking water 15-30 minutes before or after meals  -limiting restaurant or cafeteria eating to twice a week or less    We discussed the importance of restorative sleep and stress management in maintaining a healthy weight.  We discussed the National Weight Control Registry " healthy weight maintenance strategies and ways to optimize metabolism.  We discussed the importance of physical activity including cardiovascular and strength training in maintaining a healthier weight.    We discussed the importance of life-long vitamin supplementation and life-long  follow-up.    Israel was reminded that, to avoid marginal ulcers she should avoid tobacco at all, alcohol in excess, caffeine in excess, and NSAIDS (unless indicated for cardioprotection or othewise and opposed by a PPI).    Shon Chand MD    [facility].  2022  7:38 AM  [phone] (clinic phone)  [phone] (fax)    No images are attached to the encounter.  Medical Decision Makin minutes spent on the date of the encounter doing chart review, history and exam, documentation and further activities per the note

## 2022-09-14 NOTE — LETTER
9/14/2022         RE: Israel Zabala  [id number]  [place] [id number]        Dear Colleague,    Thank you for referring your patient, Israel Zabala, to [facility] Please see a copy of my visit note below.    Israel Zabala is 41 year old  female who presents for a billable video visit today.    How would you like to obtain your AVS? MyChart  If dropped from the video visit, the video invitation should be resent by: Text to cell phone: [phone]  Will anyone else be joining your video visit? No      Video Start Time: 8:02 AM    Are there any specific questions or needs that you would like addressed at your visit today? Follow up          Video-Visit Details    Type of service:  Video Visit    Video End Time (time video stopped): 8:23 AM  Originating Location (pt. Location): Home    Distant Location (provider location):  [facility]     Platform used for Video Visit: [facility]  Bariatric Follow Up Visit with a History of Previous Bariatric Surgery     Date of visit: 9/14/2022  Physician: Shon Chand MD, MD  Primary Care Provider:  Darcy Yang  Israel Zabala   41 year old  female    Date of Surgery: 2003  Initial Weight: 284 lbspreop  w/ establishmeht of care weight in 2018 at 276 lbs here  Today's Weight:   Wt Readings from Last 1 Encounters:   09/14/22 115.7 kg (255 lb)     Weight history:   Wt Readings from Last 4 Encounters:   09/14/22 115.7 kg (255 lb)   07/28/22 117 kg (258 lb)   06/22/22 119.3 kg (263 lb)   03/10/22 122.5 kg (270 lb)      Body mass index is 46.64 kg/m .      Assessment and Plan     Assessment: Israel is a 41 year old year old female who is 17 years s/p  Dung en Y Gastric Bypass with Dr. Dey at [facility].  She had initiated combined therapy for weight related health and evolving diabetes this past year with good success at our last visit w/ reduction in A1c  at the 0.5mg/week dose of Ozempic, she's due for recheck of A1c.  Weight is continuing to come down slowly over the course of the summer, 8 lbs less vs June.  She's tolerating the 0.5mg/week dose well now and feels comfortable continuing on this dose for now. I'll refill 6 months worth and we'll recheck progress and annual Bariatric visit topics at our next check up in 4 months.        Encounter Diagnoses   Name Primary?     Borderline diabetes      Postoperative malabsorption      Hx of gastric bypass      Class 3 severe obesity due to excess calories with serious comorbidity and body mass index (BMI) of 45.0 to 49.9 in adult (H) Yes         Current Outpatient Medications:      Calcium Citrate-Vitamin D (CALCIUM CITRATE +D PO), Take 500 mg by mouth daily, Disp: , Rfl:      Cholecalciferol (VITAMIN D) 2000 UNITS tablet, Take 1 tablet by mouth daily, Disp: , Rfl:      clotrimazole (LOTRIMIN) 1 % external cream, Apply topically 2 times daily, Disp: 45 g, Rfl: 0     ferrous sulfate (IRON) 325 (65 FE) MG tablet, Take 1 tablet (325 mg) by mouth 2 times daily, Disp: 60 tablet, Rfl: 2     fluticasone (FLONASE) 50 MCG/ACT nasal spray, Spray 1 spray in nostril daily, Disp: 16 g, Rfl: 0     Multiple Vitamins-Minerals (MULTIVITAMIN PO), , Disp: , Rfl:      semaglutide (OZEMPIC) 2 MG/1.5ML SOPN pen, 0.5mg injected every 7 days (once weekly)., Disp: 4.5 mL, Rfl: 2     triamcinolone (KENALOG) 0.1 % external cream, Apply topically 2 times daily, Disp: 45 g, Rfl: 0     VITAMIN B-12 PO, 1 TABLET ORALLY DAILY(HOME MED), Disp: , Rfl:      valACYclovir (VALTREX) 1000 mg tablet, Take 1 tablet (1,000 mg) by mouth 3 times daily for 7 days, Disp: 21 tablet, Rfl: 0    Plan:  1. Continue good bariatric methods of protein first at meals, chewing thoroughly and  water from meals.  2. Ozempic 0.5mg/week tolerated well. You could consider injections in the evenings to see if the mild nausea is less noticeable after injection.  Stop  using if severe abdominal pain/nausea/vomiting/rashes at injection site and let me know. Refills for the next 6-9 months w/ goal of coming off as weight continues to come down. Recheck A1c this month and we'll repeat full nutritional labs at our next visit in January.  3. Continue staying active and making time for yourself to de-stress. Exercise/walking/strength work that allows for 150minutes weekly is a good goal for general good health/organ function.  4. Consider adding in mid morning snack if protein drink is your only breakfast food.   5. Recheck with me in 4 months. If any major schedule shifts or struggling with diet, feel free to check in with our dietician.    No follow-ups on file.    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   Gets to take a brake, sit and have meals. Will prepare her meals ahead of time and prepares on her last day off for the last 5 days to reduce urge to order out. Has control now for the first time in a long time. Has a few extra snacks.   Breakfast is protein drink. Lunch and dinner is lunchables (turkey stick/meat leftover, with fruit/veggie. Spinach/cottage cheese/cukes.  Dinner is left overs.    Ozempic is tolerated well at the 0.5mg on Tuesdays. occ nausea.  Feels appetite better control    Patient Concerns: none.  Appetite (1-10): well controlled now.   GERD: no.        Medication changes: none.    Vitamin Intake:   B-12   yes   MVI  yes   Vitamin D  yes   Calcium   yes     Other  n/a              LABS: ordered recheck of A1c, will plan to repeat nutritional evaluations in new year.          Most recent labs:  Lab Results   Component Value Date    WBC 9.2 01/11/2022    HGB 13.6 01/11/2022    HCT 41.0 01/11/2022    MCV 93 01/11/2022     01/11/2022     Lab Results   Component Value Date    CHOL 202 (H) 04/08/2022     Lab Results   Component Value Date    HDL 59  2022     No components found for: LDLCALC  Lab Results   Component Value Date    TRIG 111 2022     No results found for: CHOLHDL  Lab Results   Component Value Date    ALT 30 2022    AST 20 2022    ALKPHOS 104 2022     No results found for: HGBA1C  Lab Results   Component Value Date    B12 917 (H) 2018     No components found for: VITDT1  Lab Results   Component Value Date    KETAN 9 (L) 2022     Lab Results   Component Value Date    PTHI 37 2022     Lab Results   Component Value Date    ZN 65.6 2022     Lab Results   Component Value Date    VIB1WB 167 2022     Lab Results   Component Value Date    TSH 1.73 2022     No results found for: TEST    Habits:        Social History     Social History     Socioeconomic History     Marital status:      Spouse name: Not on file     Number of children: 1     Years of education: Not on file     Highest education level: Not on file   Occupational History     Occupation:    Tobacco Use     Smoking status: Former Smoker     Packs/day: 0.10     Years: 8.00     Pack years: 0.80     Types: Cigarettes     Quit date: 2008     Years since quittin.9     Smokeless tobacco: Never Used   Vaping Use     Vaping Use: Never used   Substance and Sexual Activity     Alcohol use: Yes     Comment: occasional     Drug use: No     Sexual activity: Yes     Partners: Male     Birth control/protection: None     Comment: NFP   Other Topics Concern     Parent/sibling w/ CABG, MI or angioplasty before 65F 55M? No   Social History Narrative     Not on file     Social Determinants of Health     Financial Resource Strain: Not on file   Food Insecurity: Not on file   Transportation Needs: Not on file   Physical Activity: Not on file   Stress: Not on file   Social Connections: Not on file   Intimate Partner Violence: Not on file   Housing Stability: Not on file       Past Medical History     Past Medical History:  "  Diagnosis Date     Abnormal glandular Papanicolaou smear of cervix 2002    Negative colpo per patient     Chickenpox      Chronic maxillary sinusitis 3/31/2006     CTS (carpal tunnel syndrome) 3/6/2014     Situational anxiety 12/13/2012    Related to her cancer diagnosis     Problem List     Patient Active Problem List   Diagnosis     Obesity, Class III, BMI 40-49.9 (morbid obesity) (H)     CARDIOVASCULAR SCREENING; LDL GOAL LESS THAN 160     Malignant carcinoid tumor (H)     Morbid obesity, unspecified obesity type (H)     Hx of gastric bypass     Excessive bleeding in premenopausal period     White coat syndrome without diagnosis of hypertension     Nipple discharge in female     Postoperative malabsorption     Secondary hyperparathyroidism, non-renal (H)     Hyperlipidemia LDL goal <70     Prediabetes     Medications     [unfilled]  Surgical History     Past Surgical History  She has a past surgical history that includes gastric bypass (2003); tonsillectomy (age 7); Herniorrhaphy incisional (location) (8/19/2011); REMOVAL OF LUNG,BILOBECTOMY (Right, 11/22/2013); Bronchoscopy flexible (11/1/2013); Endobronchial ultrasound flexible (11/1/2013); Revision Dung-En-Y; hernia repair; Cholecystectomy; and Lung Lobectomy.    Objective-Exam     Constitutional:  Ht 1.575 m (5' 2\")   Wt 115.7 kg (255 lb)   BMI 46.64 kg/m    [unfilled]   General:  Pleasant and in no acute distress   Eyes:  EOMI  ENT:  Airway patent, normal voice    Neck:  Respiratory: Normal respiratory effort, no cough, .  CV:  n/a  Gastrointestinal: n/a  Musculoskeletal: muscle mass WNL  Skin: color without pallor, tanned hair thick,   Psychiatric: alert and oriented X3, mood and affect normal    Counseling     We reviewed the important post op bariatric recommendations:  -eating 3 meals daily  -eating protein first, getting >60gm protein daily  -eating slowly, chewing food well  -avoiding/limiting calorie containing beverages  -drinking water " 15-30 minutes before or after meals  -limiting restaurant or cafeteria eating to twice a week or less    We discussed the importance of restorative sleep and stress management in maintaining a healthy weight.  We discussed the National Weight Control Registry healthy weight maintenance strategies and ways to optimize metabolism.  We discussed the importance of physical activity including cardiovascular and strength training in maintaining a healthier weight.    We discussed the importance of life-long vitamin supplementation and life-long  follow-up.    Israel was reminded that, to avoid marginal ulcers she should avoid tobacco at all, alcohol in excess, caffeine in excess, and NSAIDS (unless indicated for cardioprotection or othewise and opposed by a PPI).    Shno Chand MD    [facility].  2022  7:38 AM  [phone] (clinic phone)  [phone] (fax)    No images are attached to the encounter.  Medical Decision Makin minutes spent on the date of the encounter doing chart review, history and exam, documentation and further activities per the note    a      Again, thank you for allowing me to participate in the care of your patient.        Sincerely,        Shon Chand MD

## 2022-10-23 ENCOUNTER — HEALTH MAINTENANCE LETTER (OUTPATIENT)
Age: 42
End: 2022-10-23

## 2022-11-02 ENCOUNTER — ALLIED HEALTH/NURSE VISIT (OUTPATIENT)
Dept: FAMILY MEDICINE | Facility: CLINIC | Age: 42
End: 2022-11-02
Payer: COMMERCIAL

## 2022-11-02 DIAGNOSIS — Z23 NEED FOR PROPHYLACTIC VACCINATION AND INOCULATION AGAINST INFLUENZA: Primary | ICD-10-CM

## 2022-11-02 PROCEDURE — 90471 IMMUNIZATION ADMIN: CPT

## 2022-11-02 PROCEDURE — 99207 PR NO CHARGE NURSE ONLY: CPT

## 2022-11-02 PROCEDURE — 90686 IIV4 VACC NO PRSV 0.5 ML IM: CPT

## 2022-11-15 ENCOUNTER — TELEPHONE (OUTPATIENT)
Dept: FAMILY MEDICINE | Facility: CLINIC | Age: 42
End: 2022-11-15

## 2022-11-15 NOTE — TELEPHONE ENCOUNTER
Patient Quality Outreach    Patient is due for the following:   Diabetes -  Statin    Next Steps:   Routed to provider    Type of outreach:    Routed to provider to review.      Questions for provider review:    Should this patient be on a statin or aspirin?     Selena [place], CMA

## 2022-12-01 ENCOUNTER — OFFICE VISIT (OUTPATIENT)
Dept: URGENT CARE | Facility: URGENT CARE | Age: 42
End: 2022-12-01
Payer: COMMERCIAL

## 2022-12-01 VITALS
SYSTOLIC BLOOD PRESSURE: 141 MMHG | BODY MASS INDEX: 47.37 KG/M2 | WEIGHT: 259 LBS | TEMPERATURE: 97.9 F | OXYGEN SATURATION: 100 % | DIASTOLIC BLOOD PRESSURE: 83 MMHG | HEART RATE: 79 BPM

## 2022-12-01 DIAGNOSIS — N20.0 NEPHROLITHIASIS: Primary | ICD-10-CM

## 2022-12-01 LAB
ALBUMIN UR-MCNC: NEGATIVE MG/DL
APPEARANCE UR: CLEAR
BILIRUB UR QL STRIP: NEGATIVE
COLOR UR AUTO: YELLOW
GLUCOSE UR STRIP-MCNC: NEGATIVE MG/DL
HGB UR QL STRIP: NEGATIVE
KETONES UR STRIP-MCNC: NEGATIVE MG/DL
LEUKOCYTE ESTERASE UR QL STRIP: NEGATIVE
NITRATE UR QL: NEGATIVE
PH UR STRIP: 5.5 [PH] (ref 5–7)
SP GR UR STRIP: 1.02 (ref 1–1.03)
UROBILINOGEN UR STRIP-ACNC: 0.2 E.U./DL

## 2022-12-01 PROCEDURE — 99213 OFFICE O/P EST LOW 20 MIN: CPT

## 2022-12-01 PROCEDURE — 81003 URINALYSIS AUTO W/O SCOPE: CPT

## 2022-12-01 NOTE — PATIENT INSTRUCTIONS
Diagnosis: kidney stone _ nephrolithiasis     Plan:   Pain control: NSAIDs, tylenol    Follow up with PCp if able   Most stones will pass on their own   Concern with they are too big to pass and can become stuck in ureter - Emergency Room   Monitor for severe pain  Decreased urine output   Drink plenty of fluids. This means at least 12, 8-ounce glasses of fluid--mostly water--a day.  Try to stay as active as possible. This will help the stone pass.   Don't stay in bed unless your pain keeps you from getting up.   You may notice a red, pink, or brown color to your urine. This is normal while passing a kidney stone.  Need to follow up with PCP in 24-48 hours to reassess if stone is passing, vs renal speciality   Also discuss prevention of stone formation in the future     Monitor for:   Weakness, dizziness, or fainting  Intense pain that does not go away   Repeated vomiting or unable to keep down fluids  Fever of 101 F  Passage of solid red or brown urine (can't see through it) or urine with lots of blood clots  Foul-smelling or cloudy urine  Unable to pass urine for 8 hours and increasing bladder pressure        Kidney Stones Nephrolithiasis   The sharp cramping pain on either side of your lower back and nausea or vomiting that you have are because of a small stone that has formed in the kidney.   It's now passing down a narrow tube (ureter) on its way to your bladder.   Once the stone reaches your bladder, the pain will often stop.   But it may come back as the stone continues to pass out of the bladder and through the urethra.   The stone may pass in your urine stream in one piece.   The size may be 1/16 inch to 1/4 inch (1 mm to 6 mm). Or, the stone may break up into darrel fragments that you may not even notice.  Once you have had a kidney stone, you are at risk of getting another one in the future.   There are 4 types of kidney stones. 80% percent are calcium stones--mostly calcium oxalate but also some with  calcium phosphate.   The other 3 types include uric acid stones, struvite stones (from a preceding infection), and rarely, cystine stones.  Most stones will pass on their own, but may take from a few hours to a few days.   Sometimes the stone is too large to pass by itself. In that case, the healthcare provider will need to use other ways to remove the stone.

## 2022-12-10 ENCOUNTER — HEALTH MAINTENANCE LETTER (OUTPATIENT)
Age: 42
End: 2022-12-10

## 2023-01-02 ENCOUNTER — TELEPHONE (OUTPATIENT)
Dept: SURGERY | Facility: CLINIC | Age: 43
End: 2023-01-02

## 2023-01-03 NOTE — TELEPHONE ENCOUNTER
PA Initiation    Medication: semaglutide (OZEMPIC) 2 MG/1.5ML SOPN pen - PA RENEWAL  Insurance Company: [email or web address] ([facility]) - Phone [phone] Fax [phone]  Pharmacy Filling the Rx: [place], [place] - [address] [place] [address]  Filling Pharmacy Phone: [phone]  Filling Pharmacy Fax: [phone]  Start Date: 1/3/2023

## 2023-01-05 NOTE — TELEPHONE ENCOUNTER
Prior Authorization Approval    Authorization Effective Date: 1/3/2023  Authorization Expiration Date: 1/5/2024  Medication: semaglutide (OZEMPIC) 2 MG/1.5ML SOPN pen - PA RENEWAL  Approved Dose/Quantity:   Reference #: [id number]   Insurance Company: [employer] ([facility]) - Phone [phone] Fax [phone]    Which Pharmacy is filling the prescription (Not needed for infusion/clinic administered): [facility] [address] [place] [address]  Pharmacy Notified: No, no interruption in therapy  Patient Notified: Yes

## 2023-01-11 ENCOUNTER — HOSPITAL ENCOUNTER (OUTPATIENT)
Dept: MAMMOGRAPHY | Facility: CLINIC | Age: 43
Discharge: HOME | End: 2023-01-11
Attending: INTERNAL MEDICINE | Admitting: INTERNAL MEDICINE
Payer: COMMERCIAL

## 2023-01-11 DIAGNOSIS — Z12.31 VISIT FOR SCREENING MAMMOGRAM: ICD-10-CM

## 2023-01-11 PROCEDURE — 77067 SCR MAMMO BI INCL CAD: CPT

## 2023-01-11 ASSESSMENT — ENCOUNTER SYMPTOMS
PALPITATIONS: 0
WEAKNESS: 0
CONSTIPATION: 0
DYSURIA: 0
JOINT SWELLING: 0
ABDOMINAL PAIN: 0
NAUSEA: 0
PARESTHESIAS: 0
SORE THROAT: 0
FEVER: 0
HEADACHES: 0
MYALGIAS: 0
ARTHRALGIAS: 0
DIARRHEA: 0
SHORTNESS OF BREATH: 0
HEMATURIA: 0
DIZZINESS: 0
COUGH: 0
HEMATOCHEZIA: 0
EYE PAIN: 0
FREQUENCY: 0
BREAST MASS: 0
HEARTBURN: 0
CHILLS: 0
NERVOUS/ANXIOUS: 0

## 2023-01-12 ENCOUNTER — OFFICE VISIT (OUTPATIENT)
Dept: FAMILY MEDICINE | Facility: CLINIC | Age: 43
End: 2023-01-12
Payer: COMMERCIAL

## 2023-01-12 VITALS
SYSTOLIC BLOOD PRESSURE: 126 MMHG | OXYGEN SATURATION: 100 % | HEIGHT: 62 IN | TEMPERATURE: 97.2 F | BODY MASS INDEX: 46.93 KG/M2 | HEART RATE: 76 BPM | DIASTOLIC BLOOD PRESSURE: 80 MMHG | RESPIRATION RATE: 18 BRPM | WEIGHT: 255 LBS

## 2023-01-12 DIAGNOSIS — E66.813 CLASS 3 SEVERE OBESITY DUE TO EXCESS CALORIES WITH SERIOUS COMORBIDITY AND BODY MASS INDEX (BMI) OF 45.0 TO 49.9 IN ADULT (H): ICD-10-CM

## 2023-01-12 DIAGNOSIS — D50.9 IRON DEFICIENCY ANEMIA, UNSPECIFIED IRON DEFICIENCY ANEMIA TYPE: ICD-10-CM

## 2023-01-12 DIAGNOSIS — E66.01 CLASS 3 SEVERE OBESITY DUE TO EXCESS CALORIES WITH SERIOUS COMORBIDITY AND BODY MASS INDEX (BMI) OF 45.0 TO 49.9 IN ADULT (H): ICD-10-CM

## 2023-01-12 DIAGNOSIS — R73.03 PREDIABETES: ICD-10-CM

## 2023-01-12 DIAGNOSIS — E78.5 HYPERLIPIDEMIA LDL GOAL <70: ICD-10-CM

## 2023-01-12 DIAGNOSIS — Z00.00 ANNUAL PHYSICAL EXAM: Primary | ICD-10-CM

## 2023-01-12 DIAGNOSIS — E23.6 EMPTY SELLA (H): ICD-10-CM

## 2023-01-12 DIAGNOSIS — R03.0 WHITE COAT SYNDROME WITHOUT DIAGNOSIS OF HYPERTENSION: ICD-10-CM

## 2023-01-12 LAB
ALBUMIN SERPL BCG-MCNC: 4.4 G/DL (ref 3.5–5.2)
ALP SERPL-CCNC: 101 U/L (ref 35–104)
ALT SERPL W P-5'-P-CCNC: 28 U/L (ref 10–35)
ANION GAP SERPL CALCULATED.3IONS-SCNC: 11 MMOL/L (ref 7–15)
AST SERPL W P-5'-P-CCNC: 26 U/L (ref 10–35)
BILIRUB SERPL-MCNC: 0.3 MG/DL
BUN SERPL-MCNC: 14.6 MG/DL (ref 6–20)
CALCIUM SERPL-MCNC: 9.7 MG/DL (ref 8.6–10)
CHLORIDE SERPL-SCNC: 104 MMOL/L (ref 98–107)
CHOLEST SERPL-MCNC: 242 MG/DL
CREAT SERPL-MCNC: 0.7 MG/DL (ref 0.51–0.95)
CREAT UR-MCNC: 57.8 MG/DL
DEPRECATED HCO3 PLAS-SCNC: 25 MMOL/L (ref 22–29)
FERRITIN SERPL-MCNC: 42 NG/ML (ref 6–175)
GFR SERPL CREATININE-BSD FRML MDRD: >90 ML/MIN/1.73M2
GLUCOSE SERPL-MCNC: 100 MG/DL (ref 70–99)
HBA1C MFR BLD: 5.5 % (ref 0–5.6)
HDLC SERPL-MCNC: 74 MG/DL
IRON BINDING CAPACITY (ROCHE): 395 UG/DL (ref 240–430)
IRON SATN MFR SERPL: 20 % (ref 15–46)
IRON SERPL-MCNC: 78 UG/DL (ref 37–145)
LDLC SERPL CALC-MCNC: 145 MG/DL
MICROALBUMIN UR-MCNC: <12 MG/L
MICROALBUMIN/CREAT UR: NORMAL MG/G{CREAT}
NONHDLC SERPL-MCNC: 168 MG/DL
POTASSIUM SERPL-SCNC: 4.1 MMOL/L (ref 3.4–5.3)
PROT SERPL-MCNC: 7.8 G/DL (ref 6.4–8.3)
SODIUM SERPL-SCNC: 140 MMOL/L (ref 136–145)
TRIGL SERPL-MCNC: 113 MG/DL

## 2023-01-12 PROCEDURE — 80061 LIPID PANEL: CPT | Performed by: NURSE PRACTITIONER

## 2023-01-12 PROCEDURE — 82570 ASSAY OF URINE CREATININE: CPT | Performed by: NURSE PRACTITIONER

## 2023-01-12 PROCEDURE — 36415 COLL VENOUS BLD VENIPUNCTURE: CPT | Performed by: NURSE PRACTITIONER

## 2023-01-12 PROCEDURE — 83540 ASSAY OF IRON: CPT | Performed by: NURSE PRACTITIONER

## 2023-01-12 PROCEDURE — 82607 VITAMIN B-12: CPT | Performed by: NURSE PRACTITIONER

## 2023-01-12 PROCEDURE — 83550 IRON BINDING TEST: CPT | Performed by: NURSE PRACTITIONER

## 2023-01-12 PROCEDURE — 99213 OFFICE O/P EST LOW 20 MIN: CPT | Mod: 25 | Performed by: NURSE PRACTITIONER

## 2023-01-12 PROCEDURE — 83036 HEMOGLOBIN GLYCOSYLATED A1C: CPT | Performed by: NURSE PRACTITIONER

## 2023-01-12 PROCEDURE — 82043 UR ALBUMIN QUANTITATIVE: CPT | Performed by: NURSE PRACTITIONER

## 2023-01-12 PROCEDURE — 80053 COMPREHEN METABOLIC PANEL: CPT | Performed by: NURSE PRACTITIONER

## 2023-01-12 PROCEDURE — 82728 ASSAY OF FERRITIN: CPT | Performed by: NURSE PRACTITIONER

## 2023-01-12 PROCEDURE — 99396 PREV VISIT EST AGE 40-64: CPT | Performed by: NURSE PRACTITIONER

## 2023-01-12 RX ORDER — ATORVASTATIN CALCIUM 10 MG/1
10 TABLET, FILM COATED ORAL DAILY
Qty: 90 TABLET | Refills: 3 | Status: SHIPPED | OUTPATIENT
Start: 2023-01-12 | End: 2024-01-11

## 2023-01-12 ASSESSMENT — ENCOUNTER SYMPTOMS
MYALGIAS: 0
DYSURIA: 0
NERVOUS/ANXIOUS: 0
CHILLS: 0
HEADACHES: 0
PALPITATIONS: 0
FEVER: 0
HEMATURIA: 0
SORE THROAT: 0
BREAST MASS: 0
FREQUENCY: 0
CONSTIPATION: 0
PARESTHESIAS: 0
HEARTBURN: 0
COUGH: 0
NAUSEA: 0
SHORTNESS OF BREATH: 0
JOINT SWELLING: 0
ABDOMINAL PAIN: 0
EYE PAIN: 0
HEMATOCHEZIA: 0
WEAKNESS: 0
ARTHRALGIAS: 0
DIZZINESS: 0
DIARRHEA: 0

## 2023-01-12 ASSESSMENT — PAIN SCALES - GENERAL: PAINLEVEL: NO PAIN (0)

## 2023-01-12 NOTE — PROGRESS NOTES
SUBJECTIVE:   CC: Israel is an 42 year old who presents for preventive health visit.     Patient has been advised of split billing requirements and indicates understanding: Yes  Healthy Habits:     Getting at least 3 servings of Calcium per day:  Yes    Bi-annual eye exam:  Yes    Dental care twice a year:  Yes    Sleep apnea or symptoms of sleep apnea:  None    Diet:  Regular (no restrictions)    Frequency of exercise:  2-3 days/week    Duration of exercise:  15-30 minutes    Taking medications regularly:  Yes    Medication side effects:  None    PHQ-2 Total Score: 0    Additional concerns today:  No          Today's PHQ-2 Score:   PHQ-2 (  [employer]) 2023   Q1: Little interest or pleasure in doing things 0   Q2: Feeling down, depressed or hopeless 0   PHQ-2 Score 0   PHQ-2 Total Score (12-17 Years)- Positive if 3 or more points; Administer PHQ-A if positive -   Q1: Little interest or pleasure in doing things Not at all   Q2: Feeling down, depressed or hopeless Not at all   PHQ-2 Score 0           Social History     Tobacco Use     Smoking status: Former     Packs/day: 0.10     Years: 8.00     Pack years: 0.80     Types: Cigarettes     Quit date: 2008     Years since quittin.3     Smokeless tobacco: Never   Substance Use Topics     Alcohol use: Yes     Comment: occasional     If you drink alcohol do you typically have >3 drinks per day or >7 drinks per week? No    Alcohol Use 2023   Prescreen: >3 drinks/day or >7 drinks/week? No   Prescreen: >3 drinks/day or >7 drinks/week? -       Reviewed orders with patient.  Reviewed health maintenance and updated orders accordingly - Yes  BP Readings from Last 3 Encounters:   23 126/80   22 (!) 141/83   22 136/80    Wt Readings from Last 3 Encounters:   23 115.7 kg (255 lb)   22 117.5 kg (259 lb)   22 115.7 kg (255 lb)                  Patient Active Problem List   Diagnosis     Obesity, Class III, BMI 40-49.9 (morbid  obesity) (H)     CARDIOVASCULAR SCREENING; LDL GOAL LESS THAN 160     Malignant carcinoid tumor (H)     Morbid obesity, unspecified obesity type (H)     Hx of gastric bypass     Excessive bleeding in premenopausal period     White coat syndrome without diagnosis of hypertension     Nipple discharge in female     Postoperative malabsorption     Secondary hyperparathyroidism, non-renal (H)     Hyperlipidemia LDL goal <70     Prediabetes     Past Surgical History:   Procedure Laterality Date     BRONCHOSCOPY FLEXIBLE  2013    related to carcinoid;  Flexible Bronchoscopy, Endobronchial Flexible Ultrasound;  Surgeon: Chris Sal MD;  Location: [facility] OR     CHOLECYSTECTOMY       ENDOBRONCHIAL ULTRASOUND FLEXIBLE  2013    Procedure: ENDOBRONCHIAL ULTRASOUND FLEXIBLE;;  Surgeon: Chris Sal MD;  Location: [facility] OR     GASTRIC BYPASS  2003     HERNIA REPAIR      , supraumbilical     HERNIORRHAPHY INCISIONAL (LOCATION)  2011    Procedure:HERNIORRHAPHY INCISIONAL (LOCATION); Incisional Hernia Repair upper abdomen midline       LUNG LOBECTOMY      for carcinoid, done at [place]     REVISION KATHY-EN-Y       TONSILLECTOMY  age 7     ZZC REMOVAL OF LUNG,BILOBECTOMY Right 2013    Carcinoid: Right Middle and Lower Lobe       Social History     Tobacco Use     Smoking status: Former     Packs/day: 0.10     Years: 8.00     Pack years: 0.80     Types: Cigarettes     Quit date: 2008     Years since quittin.3     Smokeless tobacco: Never   Substance Use Topics     Alcohol use: Yes     Comment: occasional     Family History   Problem Relation Age of Onset     Breast Cancer Maternal Grandmother 65     Cancer Maternal Grandmother         skin     Diabetes Maternal Grandfather      Heart Disease Paternal Grandmother         MI     Cerebrovascular Disease Paternal Grandmother      Heart Disease Paternal Grandfather         MI     Gastrointestinal Disease Brother         Colitis      Prostate Cancer Father      Cancer Mother         skin     Congenital Anomalies Daughter         imperforate anus     Hypertension Mother      Hyperlipidemia Father          Current Outpatient Medications   Medication Sig Dispense Refill     Calcium Citrate-Vitamin D (CALCIUM CITRATE +D PO) Take 500 mg by mouth daily       Cholecalciferol (VITAMIN D) 2000 UNITS tablet Take 1 tablet by mouth daily       ferrous sulfate (IRON) 325 (65 FE) MG tablet Take 1 tablet (325 mg) by mouth 2 times daily 60 tablet 2     Multiple Vitamins-Minerals (MULTIVITAMIN PO)        semaglutide (OZEMPIC) 2 MG/1.5ML SOPN pen 0.5mg injected every 7 days (once weekly). 4.5 mL 2     VITAMIN B-12 PO 1 TABLET ORALLY DAILY(HOME MED)       clotrimazole (LOTRIMIN) 1 % external cream Apply topically 2 times daily 45 g 0     fluticasone (FLONASE) 50 MCG/ACT nasal spray Spray 1 spray in nostril daily 16 g 0     triamcinolone (KENALOG) 0.1 % external cream Apply topically 2 times daily 45 g 0     valACYclovir (VALTREX) 1000 mg tablet Take 1 tablet (1,000 mg) by mouth 3 times daily for 7 days 21 tablet 0     No Known Allergies  Recent Labs   Lab Test 04/08/22  1019 01/25/22  1318 01/11/22  1100 12/18/20  1020 04/15/19  1026 12/27/18  1045 12/27/18  1045 12/27/18  0000   A1C 5.7*  --  6.6* 6.2*  --    < >  --  6.4*   *  --  161* 155*  --   --   --   --    HDL 59  --  63 67  --   --   --   --    TRIG 111  --  191* 156*  --   --   --   --    ALT  --   --  30  --   --   --  15 15   CR  --   --  0.62 0.71  --   --  0.66 0.66   GFRESTIMATED  --   --  >90 >90  --   --  >60 >60   GFRESTBLACK  --   --   --  >90  --   --  >60 >60   POTASSIUM  --   --  3.7 4.3  --   --  4.3 4.3   TSH  --  1.73  --   --  2.50  --  2.03 2.03    < > = values in this interval not displayed.        Breast Cancer Screening:    Breast CA Risk Assessment (FHS-7) 1/11/2022 1/13/2022   Do you have a family history of breast, colon, or ovarian cancer? Yes No / Unknown       click  delete button to remove this line now  Mammogram Screening - Offered annual screening and updated Health Maintenance for [place] plan based on risk factor consideration    Pertinent mammograms are reviewed under the imaging tab.    History of abnormal Pap smear: NO - age 30- 65 PAP every 3 years recommended  PAP / HPV Latest Ref Rng & Units 2018   PAP   Negative for Intraepithelial Lesion or Malignancy (NILM) - -   PAP (Historical) - - NIL NIL   HPV16 Negative Negative Negative Negative   HPV18 Negative Negative Negative Negative   HRHPV Negative Negative Negative Negative     Reviewed and updated as needed this visit by clinical staff                  Reviewed and updated as needed this visit by Provider                 Past Medical History:   Diagnosis Date     Abnormal glandular Papanicolaou smear of cervix     Negative colpo per patient     Chickenpox      Chronic maxillary sinusitis 3/31/2006     CTS (carpal tunnel syndrome) 3/6/2014     Situational anxiety 2012    Related to her cancer diagnosis      Past Surgical History:   Procedure Laterality Date     BRONCHOSCOPY FLEXIBLE  2013    related to carcinoid;  Flexible Bronchoscopy, Endobronchial Flexible Ultrasound;  Surgeon: Chris Sal MD;  Location: [facility]     CHOLECYSTECTOMY       ENDOBRONCHIAL ULTRASOUND FLEXIBLE  2013    Procedure: ENDOBRONCHIAL ULTRASOUND FLEXIBLE;;  Surgeon: Chris Sal MD;  Location: [facility]     GASTRIC BYPASS  2003     HERNIA REPAIR      , supraumbilical     HERNIORRHAPHY INCISIONAL (LOCATION)  2011    Procedure:HERNIORRHAPHY INCISIONAL (LOCATION); Incisional Hernia Repair upper abdomen midline       LUNG LOBECTOMY      for carcinoid, done at [place]     REVISION KATHY-EN-Y       TONSILLECTOMY  age 7     ZZC REMOVAL OF LUNG,BILOBECTOMY Right 2013    Carcinoid: Right Middle and Lower Lobe     OB History    Para Term  AB Living   2 2 2 0 0  "2   SAB IAB Ectopic Multiple Live Births   0 0 0 0 2      # Outcome Date GA Lbr Jcarlos/2nd Weight Sex Delivery Anes PTL Lv   2 Term 10/31/12 38w6d 03:28 / 00:12 3.742 kg (8 lb 4 oz) F Vag-Spont INT  EAMON      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   HENT: Negative for congestion, ear pain, hearing loss and sore throat.    Eyes: Negative for pain and visual disturbance.   Respiratory: Negative for cough and shortness of breath.    Cardiovascular: Negative for chest pain, palpitations and peripheral edema.   Gastrointestinal: Negative for abdominal pain, constipation, diarrhea, heartburn, hematochezia and nausea.   Breasts:  Negative for tenderness, breast mass and discharge.   Genitourinary: Negative for dysuria, frequency, genital sores, hematuria, pelvic pain, urgency, vaginal bleeding and vaginal discharge.   Musculoskeletal: Negative for arthralgias, joint swelling and myalgias.   Skin: Negative for rash.   Neurological: Negative for dizziness, weakness, headaches and paresthesias.   Psychiatric/Behavioral: Negative for mood changes. The patient is not nervous/anxious.         OBJECTIVE:   /80 (BP Location: Right arm, Cuff Size: Adult Large)   Pulse 76   Temp 97.2  F (36.2  C) (Tympanic)   Resp 18   Ht 1.568 m (5' 1.75\")   Wt 115.7 kg (255 lb)   SpO2 100%   BMI 47.02 kg/m    Physical Exam  GENERAL APPEARANCE: healthy, alert and no distress  EYES: Eyes grossly normal to inspection, PERRL and conjunctivae and sclerae normal  HENT: ear canals and TM's normal, nose " and mouth without ulcers or lesions, oropharynx clear and oral mucous membranes moist  NECK: no adenopathy, no asymmetry, masses, or scars and thyroid normal to palpation  RESP: lungs clear to auscultation - no rales, rhonchi or wheezes  CV: regular rate and rhythm, normal S1 S2, no S3 or S4, no murmur, click or rub, no peripheral edema and peripheral pulses strong  ABDOMEN: soft, nontender, no hepatosplenomegaly, no masses and bowel sounds normal  MS: no musculoskeletal defects are noted and gait is age appropriate without ataxia  SKIN: no suspicious lesions or rashes  NEURO: Normal strength and tone, sensory exam grossly normal, mentation intact and speech normal  PSYCH: mentation appears normal and affect normal/bright    Diagnostic Test Results:  Labs reviewed in Epic  Results for orders placed or performed in visit on 01/12/23   Hemoglobin A1c     Status: Normal   Result Value Ref Range    Hemoglobin A1C 5.5 0.0 - 5.6 %   Albumin Random Urine Quantitative with Creat Ratio     Status: None   Result Value Ref Range    Albumin Urine mg/L <12.0 mg/L    Albumin Urine mg/g Cr      Creatinine Urine mg/dL 57.8 mg/dL   Lipid panel reflex to direct LDL Fasting     Status: Abnormal   Result Value Ref Range    Cholesterol 242 (H) <200 mg/dL    Triglycerides 113 <150 mg/dL    Direct Measure HDL 74 >=50 mg/dL    LDL Cholesterol Calculated 145 (H) <=100 mg/dL    Non HDL Cholesterol 168 (H) <130 mg/dL    Narrative    Cholesterol  Desirable:  <200 mg/dL    Triglycerides  Normal:  Less than 150 mg/dL  Borderline High:  150-199 mg/dL  High:  200-499 mg/dL  Very High:  Greater than or equal to 500 mg/dL    Direct Measure HDL  Female:  Greater than or equal to 50 mg/dL   Male:  Greater than or equal to 40 mg/dL    LDL Cholesterol  Desirable:  <100mg/dL  Above Desirable:  100-129 mg/dL   Borderline High:  130-159 mg/dL   High:  160-189 mg/dL   Very High:  >= 190 mg/dL    Non HDL Cholesterol  Desirable:  130 mg/dL  Above Desirable:   130-159 mg/dL  Borderline High:  160-189 mg/dL  High:  190-219 mg/dL  Very High:  Greater than or equal to 220 mg/dL   Comprehensive metabolic panel (BMP + Alb, Alk Phos, ALT, AST, Total. Bili, TP)     Status: Abnormal   Result Value Ref Range    Sodium 140 136 - 145 mmol/L    Potassium 4.1 3.4 - 5.3 mmol/L    Chloride 104 98 - 107 mmol/L    Carbon Dioxide (CO2) 25 22 - 29 mmol/L    Anion Gap 11 7 - 15 mmol/L    Urea Nitrogen 14.6 6.0 - 20.0 mg/dL    Creatinine 0.70 0.51 - 0.95 mg/dL    Calcium 9.7 8.6 - 10.0 mg/dL    Glucose 100 (H) 70 - 99 mg/dL    Alkaline Phosphatase 101 35 - 104 U/L    AST 26 10 - 35 U/L    ALT 28 10 - 35 U/L    Protein Total 7.8 6.4 - 8.3 g/dL    Albumin 4.4 3.5 - 5.2 g/dL    Bilirubin Total 0.3 <=1.2 mg/dL    GFR Estimate >90 >60 mL/min/1.73m2   Iron and iron binding capacity     Status: Normal   Result Value Ref Range    Iron 78 37 - 145 ug/dL    Iron Binding Capacity 395 240 - 430 ug/dL    Iron Sat Index 20 15 - 46 %   Ferritin     Status: Normal   Result Value Ref Range    Ferritin 42 6 - 175 ng/mL   Vitamin B12     Status: Normal   Result Value Ref Range    Vitamin B12 738 232 - 1,245 pg/mL       ASSESSMENT/PLAN:   (Z00.00) Annual physical exam  (primary encounter diagnosis)  Comment:   Plan: REVIEW OF HEALTH MAINTENANCE PROTOCOL ORDERS,         Hemoglobin A1c, Lipid panel reflex to direct         LDL Fasting, Comprehensive metabolic panel (BMP        + Alb, Alk Phos, ALT, AST, Total. Bili, TP)            (E66.01,  Z68.42) Class 3 severe obesity due to excess calories with serious comorbidity and body mass index (BMI) of 45.0 to 49.9 in adult (H)  Comment: reviewed diet is seeing comprehensive weight  loss clinic   Plan: OFFICE/OUTPT VISIT,MICHELL GOODWIN III      (R03.0) White coat syndrome without diagnosis of hypertension  Comment:   Plan:     (E23.6) Empty sella (H)  Comment:   Plan:     (R73.03) Prediabetes  Comment: continue to monitor and work with weight loss clinic   Plan: Hemoglobin  A1c, Albumin Random Urine         Quantitative with Creat Ratio, OFFICE/OUTPT         VISIT,EST,LEVL III      (E78.5) Hyperlipidemia LDL goal <70  Comment:   Plan: Lipid panel reflex to direct LDL Fasting,         atorvastatin (LIPITOR) 10 MG tablet,         OFFICE/OUTPT VISIT,EST,LEVL III            (D50.9) Iron deficiency anemia, unspecified iron deficiency anemia type  Comment:  Controlled no change in treatment plan  Plan: Iron and iron binding capacity, Ferritin,         Vitamin B12, OFFICE/OUTPT VISIT,EST,LEVL III        Patient has been advised of split billing requirements and indicates understanding: Yes      COUNSELING:  Reviewed preventive health counseling, as reflected in patient instructions       Regular exercise       Healthy diet/nutrition       Aspirin prophylaxis       Alcohol Use       Family planning       Folic Acid       Osteoporosis prevention/bone health       (Paz)menopause management        She reports that she quit smoking about 14 years ago. Her smoking use included cigarettes. She has a 0.80 pack-year smoking history. She has never used smokeless tobacco.          GILDA Garay CNP  [facility]

## 2023-01-13 LAB — VIT B12 SERPL-MCNC: 738 PG/ML (ref 232–1245)

## 2023-01-17 ENCOUNTER — ANCILLARY PROCEDURE (OUTPATIENT)
Dept: MAMMOGRAPHY | Facility: CLINIC | Age: 43
End: 2023-01-17
Attending: INTERNAL MEDICINE
Payer: COMMERCIAL

## 2023-01-17 DIAGNOSIS — R92.8 ABNORMAL MAMMOGRAM: ICD-10-CM

## 2023-01-17 PROCEDURE — 77065 DX MAMMO INCL CAD UNI: CPT | Mod: RT | Performed by: RADIOLOGY

## 2023-01-17 PROCEDURE — 76642 ULTRASOUND BREAST LIMITED: CPT | Mod: RT | Performed by: RADIOLOGY

## 2023-01-17 PROCEDURE — G0279 TOMOSYNTHESIS, MAMMO: HCPCS | Performed by: RADIOLOGY

## 2023-01-19 ENCOUNTER — VIRTUAL VISIT (OUTPATIENT)
Dept: SURGERY | Facility: CLINIC | Age: 43
End: 2023-01-19
Payer: COMMERCIAL

## 2023-01-19 VITALS — HEIGHT: 63 IN | BODY MASS INDEX: 44.83 KG/M2 | WEIGHT: 253 LBS

## 2023-01-19 DIAGNOSIS — E66.813 CLASS 3 SEVERE OBESITY DUE TO EXCESS CALORIES WITH SERIOUS COMORBIDITY AND BODY MASS INDEX (BMI) OF 45.0 TO 49.9 IN ADULT (H): ICD-10-CM

## 2023-01-19 DIAGNOSIS — K91.2 POSTOPERATIVE MALABSORPTION: Primary | ICD-10-CM

## 2023-01-19 DIAGNOSIS — R73.03 BORDERLINE DIABETES: ICD-10-CM

## 2023-01-19 DIAGNOSIS — Z98.84 HX OF GASTRIC BYPASS: ICD-10-CM

## 2023-01-19 DIAGNOSIS — E66.01 CLASS 3 SEVERE OBESITY DUE TO EXCESS CALORIES WITH SERIOUS COMORBIDITY AND BODY MASS INDEX (BMI) OF 45.0 TO 49.9 IN ADULT (H): ICD-10-CM

## 2023-01-19 PROCEDURE — 99214 OFFICE O/P EST MOD 30 MIN: CPT | Mod: 95 | Performed by: EMERGENCY MEDICINE

## 2023-01-19 NOTE — LETTER
1/19/2023         RE: Israel Zabala  [id number]  [facility] [id number]        Dear Colleague,    Thank you for referring your patient, Israel Zabala, to the [facility] [place]. Please see a copy of my visit note below.    Bariatric Follow Up Visit with a History of Previous Bariatric Surgery     Date of visit: 1/19/2023  Physician: Shon Chand MD, MD  Primary Care Provider:  Darcy Yang  Israel Zabala   42 year old  female    Date of Surgery: 2003 in [place] [facility] with Dr. Dey  Initial Weight: 284 lbs  Initial BMI: n/a  Intake with our program in 2018 at 276 lbs  Borderline diabetes in the past at 6.4% A1c, started ozempic January of 2022.  Today's Weight:   Wt Readings from Last 1 Encounters:   01/19/23 114.8 kg (253 lb)     Weight history:   Wt Readings from Last 4 Encounters:   01/19/23 114.8 kg (253 lb)   01/12/23 115.7 kg (255 lb)   12/01/22 117.5 kg (259 lb)   09/14/22 115.7 kg (255 lb)      Body mass index is 45.17 kg/m .      Assessment and Plan     Assessment: Israel is a 42 year old year old female who is 20 years s/p  Dung en Y Gastric Bypass with Dr. Dey.  1/12/23 labs are looking well supported from vitamin use. Ozempmic 0.5mg/week has been tolerated well. A1c 5.5%, good response.  Bariatric habits have been struggling to get meals in over the first half of her day.  Her weight has been coming down. Now down about 8-9% since 2018 starting weight when she established care at 276 lbs. .  Israel Zabala feels as if she hasn't fully achieved the goals she hoped to accomplish through bariatric surgery and weight loss.    Encounter Diagnosis   Name Primary?     Postoperative malabsorption Yes         Current Outpatient Medications:      atorvastatin (LIPITOR) 10 MG tablet, Take 1 tablet (10 mg) by mouth daily, Disp: 90 tablet, Rfl: 3     Calcium Citrate-Vitamin D (CALCIUM CITRATE +D PO), Take 500 mg by mouth daily, Disp: , Rfl:       "Cholecalciferol (VITAMIN D) 2000 UNITS tablet, Take 1 tablet by mouth daily, Disp: , Rfl:      ferrous sulfate (IRON) 325 (65 FE) MG tablet, Take 1 tablet (325 mg) by mouth 2 times daily, Disp: 60 tablet, Rfl: 2     Multiple Vitamins-Minerals (MULTIVITAMIN PO), , Disp: , Rfl:      semaglutide (OZEMPIC) 2 MG/1.5ML SOPN pen, 0.5mg injected every 7 days (once weekly)., Disp: 4.5 mL, Rfl: 2     VITAMIN B-12 PO, 1 TABLET ORALLY DAILY(HOME MED), Disp: , Rfl:     Plan:  1.  Check vitamin labs this month at your convenience. B12 levels looked good at current intake on your 1/12/23 labs.    2. Given iron intolerance lately, check levels as you may be replete now. If still low ferritin or iron, consider using non iron complete multivitamin (Equate 50plus for example) and use a Vitamin Code: healthy blood daily to supplement iron needs (1-2 daily typically).      3. Continue ozempic if tolerated for now. Weight is coming down. Remember to get meals in through the day. Ideally starting no later than 7-9am and then getting meals every 4-5 hours through the day. Start with protein portion first, chew thoroughly and separate beverages from meals by 20-30 minutes to optimize intake/saitey and appetite control through the evening.    4. Great work with treadmill walks, ramping up from your 20-25 minuites to at least one 35-70minute walk weekly would be helpful. Some strength training/manual labor 2-3 days weekly would help muscle mass/metabollic rate.    5. Recheck with me in 4-6 months on ozempic tolerance.    6. I'll message results of your vitamin labs when all tests are back (about 10 days after they're drawn).   Return in about 4 months (around 5/19/2023) for Follow up, with me.    Bariatric Surgery Review     Interim History/LifeChanges:   \"doing well\". A1c improving recently. Tolerating the Ozempic well. No abdominal pains.   New job now, M-F job and adjusting to the new schedule.  Wakes up now around 4am. 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  First meal of the day is now: 8:30-9am but struggles w/ loss   Patient Concerns: some plateau in weight.  Appetite (1-10): good  GERD: none..    Reviewed whether any need/indication for screening EGD today and we will no.  Typically, a screening EGD is recommend post op year 2-3 if no symptoms to assess health of esophagus/bariatric surgery and sooner if difficult to control GERD or persistent pain/dysphagia sx despite behavior modification.    Medication changes: tolerating ozempic    Vitamin Intake:   B-12   yes   MVI  Equate complete 1 daily.   Vitamin D  yes   Calcium   yes chew.     Other  iron every other day.            No heavy periods.  LABS: partially reviewed    Nausea no  Vomiting no  Constipation yes  Diarrhea no  Rashes no  Hair Loss no  Reactive Hypoglycemia no  Light Headedness no   Moods good.      Most recent labs:  Lab Results   Component Value Date    WBC 9.2 01/11/2022    HGB 13.6 01/11/2022    HCT 41.0 01/11/2022    MCV 93 01/11/2022     01/11/2022     Lab Results   Component Value Date    CHOL 242 (H) 01/12/2023     Lab Results   Component Value Date    HDL 74 01/12/2023     No components found for: LDLCALC  Lab Results   Component Value Date    TRIG 113 01/12/2023     No results found for: CHOLHDL  Lab Results   Component Value Date    ALT 28 01/12/2023    AST 26 01/12/2023    ALKPHOS 101 01/12/2023     No results found for: HGBA1C  Lab Results   Component Value Date    B12 738 01/12/2023     No components found for: VITDT1  Lab Results   Component Value Date    KETAN 42 01/12/2023     Lab Results   Component Value Date    PTHI 37 01/25/2022     Lab Results   Component Value Date    ZN 65.6 01/25/2022     Lab Results   Component Value Date    VIB1WB 167 01/25/2022     Lab Results   Component Value Date    TSH 1.73 01/25/2022     No results found for: TEST    Habits:  Tobacco/Nicotine/THC exposure? no   NSAID use? no   Alcohol use? no  "  Caffeine Habits? no       Exercise Routine: fitness is now treadmill 3x weekly, 20-25 minutes.  Hobbies: not much right now. Kids are 10 and 13 yo and have a lot of medical issues.  Cabin get always.  3 meals/day? See above  Protein 60-80g/day? mostly  Water Separate from meals? yes  Calorie Containing Beverages: no  Restaurant eating/wk: n/a  Sleep Habits:  n/a  CPAP Use? n/a  Contraception: n/a  DEXA:will start screening next year..  Discussed annual screening to start at age 45 and continue to age 55 if scoring \"low risk\". DEXA scan recommended at age 55 regardless as long as at least 2 years have transpired from their bariatric surgery.    Social History     Social History     Socioeconomic History     Marital status:      Spouse name: Not on file     Number of children: 1     Years of education: Not on file     Highest education level: Not on file   Occupational History     Occupation:    Tobacco Use     Smoking status: Former     Packs/day: 0.10     Years: 8.00     Pack years: 0.80     Types: Cigarettes     Quit date: 2008     Years since quittin.3     Smokeless tobacco: Never   Vaping Use     Vaping Use: Never used   Substance and Sexual Activity     Alcohol use: Yes     Comment: occasional     Drug use: No     Sexual activity: Yes     Partners: Male     Birth control/protection: None     Comment: NFP   Other Topics Concern     Parent/sibling w/ CABG, MI or angioplasty before 65F 55M? No   Social History Narrative     Not on file     Social Determinants of Health     Financial Resource Strain: Not on file   Food Insecurity: Not on file   Transportation Needs: Not on file   Physical Activity: Not on file   Stress: Not on file   Social Connections: Not on file   Intimate Partner Violence: Not on file   Housing Stability: Not on file       Past Medical History     Past Medical History:   Diagnosis Date     Abnormal glandular Papanicolaou smear of cervix 2002    Negative colpo " per patient     Chickenpox      Chronic maxillary sinusitis 3/31/2006     CTS (carpal tunnel syndrome) 3/6/2014     Situational anxiety 12/13/2012    Related to her cancer diagnosis     Past Surgical History:   Procedure Laterality Date     BRONCHOSCOPY FLEXIBLE  11/01/2013    related to carcinoid;  Flexible Bronchoscopy, Endobronchial Flexible Ultrasound;  Surgeon: Chris Sal MD;  Location: [facility] OR     CHOLECYSTECTOMY       ENDOBRONCHIAL ULTRASOUND FLEXIBLE  11/01/2013    Procedure: ENDOBRONCHIAL ULTRASOUND FLEXIBLE;;  Surgeon: Chris Sal MD;  Location: [facility] OR     GASTRIC BYPASS  2003     HERNIA REPAIR      2011, supraumbilical     HERNIORRHAPHY INCISIONAL (LOCATION)  08/19/2011    Procedure:HERNIORRHAPHY INCISIONAL (LOCATION); Incisional Hernia Repair upper abdomen midline       LUNG LOBECTOMY      for carcinoid, done at [place]     REVISION DUNG-EN-Y       TONSILLECTOMY  age 7     ZZC REMOVAL OF LUNG,BILOBECTOMY Right 11/22/2013    Carcinoid: Right Middle and Lower Lobe       Problem List     Patient Active Problem List   Diagnosis     Obesity, Class III, BMI 40-49.9 (morbid obesity) (H)     CARDIOVASCULAR SCREENING; LDL GOAL LESS THAN 160     Malignant carcinoid tumor (H)     Morbid obesity, unspecified obesity type (H)     Hx of gastric bypass     Excessive bleeding in premenopausal period     White coat syndrome without diagnosis of hypertension     Nipple discharge in female     Postoperative malabsorption     Secondary hyperparathyroidism, non-renal (H)     Hyperlipidemia LDL goal <70     Prediabetes     Medications     [unfilled]  Surgical History     Past Surgical History  She has a past surgical history that includes gastric bypass (2003); tonsillectomy (age 7); Herniorrhaphy incisional (location) (08/19/2011); REMOVAL OF LUNG,BILOBECTOMY (Right, 11/22/2013); Bronchoscopy flexible (11/01/2013); Endobronchial ultrasound flexible (11/01/2013); Revision Dung-En-Y; hernia  "repair; Cholecystectomy; and Lung Lobectomy.    Objective-Exam     Constitutional:  Ht 1.594 m (5' 2.75\")   Wt 114.8 kg (253 lb)   BMI 45.17 kg/m    [unfilled]   General:  Pleasant and in no acute distress   Eyes:  EOMI  ENT:  Airway patent    Neck:  Respiratory: Normal respiratory effort, no cough, .  CV:    Gastrointestinal: obese  Musculoskeletal: muscle mass WNL  Skin: color without pallor,  hair thick/long,   Psychiatric: alert and oriented X3, mood and affect normal    Counseling     We reviewed the important post op bariatric recommendations:  -eating 3 meals daily  -eating protein first, getting >60gm protein daily  -eating slowly, chewing food well  -avoiding/limiting calorie containing beverages  -drinking water 15-30 minutes before or after meals  -limiting restaurant or cafeteria eating to twice a week or less    We discussed the importance of restorative sleep and stress management in maintaining a healthy weight.  We discussed the National Weight Control Registry healthy weight maintenance strategies and ways to optimize metabolism.  We discussed the importance of physical activity including cardiovascular and strength training in maintaining a healthier weight.    We discussed the importance of life-long vitamin supplementation and life-long  follow-up.    Israel was reminded that, to avoid marginal ulcers she should avoid tobacco at all, alcohol in excess, caffeine in excess, and NSAIDS (unless indicated for cardioprotection or othewise and opposed by a PPI).    Shon Chand MD    [facility].  2023  10:38 AM  [phone] (clinic phone)  [phone] (fax)    No images are attached to the encounter.  Medical Decision Makin minutes spent on the date of the encounter doing chart review, history and exam, documentation and further activities per the note    Israel Zabala is 42 year old  female who presents for a billable video visit today.    How would you like " to obtain your AVS? [employer]  If dropped from the video visit, the video invitation should be resent by: Text to cell phone: [phone]  Will anyone else be joining your video visit? No      Video Start Time: 3:00pm    Are there any specific questions or needs that you would like addressed at your visit today? Return MWM - doing well, no concerns     Provider Notes: see my note      Video-Visit Details    Type of service:  Video Visit    Platform used for Video Visit: SpeakUp    Video End Time (time video stopped): 3:33 PM    Originating Location (pt. Location): Home        Distant Location (provider location):  Off-site    Distant Location (provider location):  [facility] [place]       Again, thank you for allowing me to participate in the care of your patient.        Sincerely,        Shon Chand MD

## 2023-01-19 NOTE — PROGRESS NOTES
Bariatric Follow Up Visit with a History of Previous Bariatric Surgery     Date of visit: 1/19/2023  Physician: Shon Chand MD, MD  Primary Care Provider:  Darcy Yang  Israel Zabala   42 year old  female    Date of Surgery: 2003 in [facility] with Dr. Dey  Initial Weight: 284 lbs  Initial BMI: n/a  Intake with our program in 2018 at 276 lbs  Borderline diabetes in the past at 6.4% A1c, started ozempic January of 2022.  Today's Weight:   Wt Readings from Last 1 Encounters:   01/19/23 114.8 kg (253 lb)     Weight history:   Wt Readings from Last 4 Encounters:   01/19/23 114.8 kg (253 lb)   01/12/23 115.7 kg (255 lb)   12/01/22 117.5 kg (259 lb)   09/14/22 115.7 kg (255 lb)      Body mass index is 45.17 kg/m .      Assessment and Plan     Assessment: Israel is a 42 year old year old female who is 20 years s/p  Dung en Y Gastric Bypass with Dr. Dey.  1/12/23 labs are looking well supported from vitamin use. Ozempmic 0.5mg/week has been tolerated well. A1c 5.5%, good response.  Bariatric habits have been struggling to get meals in over the first half of her day.  Her weight has been coming down. Now down about 8-9% since 2018 starting weight when she established care at 276 lbs. .  Israel Zabala feels as if she hasn't fully achieved the goals she hoped to accomplish through bariatric surgery and weight loss.    Encounter Diagnosis   Name Primary?     Postoperative malabsorption Yes         Current Outpatient Medications:      atorvastatin (LIPITOR) 10 MG tablet, Take 1 tablet (10 mg) by mouth daily, Disp: 90 tablet, Rfl: 3     Calcium Citrate-Vitamin D (CALCIUM CITRATE +D PO), Take 500 mg by mouth daily, Disp: , Rfl:      Cholecalciferol (VITAMIN D) 2000 UNITS tablet, Take 1 tablet by mouth daily, Disp: , Rfl:      ferrous sulfate (IRON) 325 (65 FE) MG tablet, Take 1 tablet (325 mg) by mouth 2 times daily, Disp: 60 tablet, Rfl: 2     Multiple Vitamins-Minerals (MULTIVITAMIN PO), , Disp: , Rfl:       "semaglutide (OZEMPIC) 2 MG/1.5ML SOPN pen, 0.5mg injected every 7 days (once weekly)., Disp: 4.5 mL, Rfl: 2     VITAMIN B-12 PO, 1 TABLET ORALLY DAILY(HOME MED), Disp: , Rfl:     Plan:  1.  Check vitamin labs this month at your convenience. B12 levels looked good at current intake on your 1/12/23 labs.    2. Given iron intolerance lately, check levels as you may be replete now. If still low ferritin or iron, consider using non iron complete multivitamin (Equate 50plus for example) and use a Vitamin Code: healthy blood daily to supplement iron needs (1-2 daily typically).      3. Continue ozempic if tolerated for now. Weight is coming down. Remember to get meals in through the day. Ideally starting no later than 7-9am and then getting meals every 4-5 hours through the day. Start with protein portion first, chew thoroughly and separate beverages from meals by 20-30 minutes to optimize intake/saitey and appetite control through the evening.    4. Great work with treadmill walks, ramping up from your 20-25 minuites to at least one 35-70minute walk weekly would be helpful. Some strength training/manual labor 2-3 days weekly would help muscle mass/metabollic rate.    5. Recheck with me in 4-6 months on ozempic tolerance.    6. I'll message results of your vitamin labs when all tests are back (about 10 days after they're drawn).   Return in about 4 months (around 5/19/2023) for Follow up, with me.    Bariatric Surgery Review     Interim History/LifeChanges:   \"doing well\". A1c improving recently. Tolerating the Ozempic well. No abdominal pains.   New job now, M-F job and adjusting to the new schedule.  Wakes up now around 4am. 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  First meal of the day is now: 8:30-9am but struggles w/ loss   Patient Concerns: some plateau in weight.  Appetite (1-10): good  GERD: none..    Reviewed whether any need/indication for screening EGD today and we will no.  Typically, " a screening EGD is recommend post op year 2-3 if no symptoms to assess health of esophagus/bariatric surgery and sooner if difficult to control GERD or persistent pain/dysphagia sx despite behavior modification.    Medication changes: tolerating ozempic    Vitamin Intake:   B-12   yes   MVI  Equate complete 1 daily.   Vitamin D  yes   Calcium   yes chew.     Other  iron every other day.            No heavy periods.  LABS: partially reviewed    Nausea no  Vomiting no  Constipation yes  Diarrhea no  Rashes no  Hair Loss no  Reactive Hypoglycemia no  Light Headedness no   Moods good.      Most recent labs:  Lab Results   Component Value Date    WBC 9.2 01/11/2022    HGB 13.6 01/11/2022    HCT 41.0 01/11/2022    MCV 93 01/11/2022     01/11/2022     Lab Results   Component Value Date    CHOL 242 (H) 01/12/2023     Lab Results   Component Value Date    HDL 74 01/12/2023     No components found for: LDLCALC  Lab Results   Component Value Date    TRIG 113 01/12/2023     No results found for: CHOLHDL  Lab Results   Component Value Date    ALT 28 01/12/2023    AST 26 01/12/2023    ALKPHOS 101 01/12/2023     No results found for: HGBA1C  Lab Results   Component Value Date    B12 738 01/12/2023     No components found for: VITDT1  Lab Results   Component Value Date    KETAN 42 01/12/2023     Lab Results   Component Value Date    PTHI 37 01/25/2022     Lab Results   Component Value Date    ZN 65.6 01/25/2022     Lab Results   Component Value Date    VIB1WB 167 01/25/2022     Lab Results   Component Value Date    TSH 1.73 01/25/2022     No results found for: TEST    Habits:  Tobacco/Nicotine/THC exposure? no   NSAID use? no   Alcohol use? no   Caffeine Habits? no       Exercise Routine: fitness is now treadmill 3x weekly, 20-25 minutes.  Hobbies: not much right now. Kids are 10 and 11 yo and have a lot of medical issues.  Cabin get always.  3 meals/day? See above  Protein 60-80g/day? mostly  Water Separate from meals?  "yes  Calorie Containing Beverages: no  Restaurant eating/wk: n/a  Sleep Habits:  n/a  CPAP Use? n/a  Contraception: n/a  DEXA:will start screening next year..  Discussed annual screening to start at age 45 and continue to age 55 if scoring \"low risk\". DEXA scan recommended at age 55 regardless as long as at least 2 years have transpired from their bariatric surgery.    Social History     Social History     Socioeconomic History     Marital status:      Spouse name: Not on file     Number of children: 1     Years of education: Not on file     Highest education level: Not on file   Occupational History     Occupation:    Tobacco Use     Smoking status: Former     Packs/day: 0.10     Years: 8.00     Pack years: 0.80     Types: Cigarettes     Quit date: 2008     Years since quittin.3     Smokeless tobacco: Never   Vaping Use     Vaping Use: Never used   Substance and Sexual Activity     Alcohol use: Yes     Comment: occasional     Drug use: No     Sexual activity: Yes     Partners: Male     Birth control/protection: None     Comment: NFP   Other Topics Concern     Parent/sibling w/ CABG, MI or angioplasty before 65F 55M? No   Social History Narrative     Not on file     Social Determinants of Health     Financial Resource Strain: Not on file   Food Insecurity: Not on file   Transportation Needs: Not on file   Physical Activity: Not on file   Stress: Not on file   Social Connections: Not on file   Intimate Partner Violence: Not on file   Housing Stability: Not on file       Past Medical History     Past Medical History:   Diagnosis Date     Abnormal glandular Papanicolaou smear of cervix     Negative colpo per patient     Chickenpox      Chronic maxillary sinusitis 3/31/2006     CTS (carpal tunnel syndrome) 3/6/2014     Situational anxiety 2012    Related to her cancer diagnosis     Past Surgical History:   Procedure Laterality Date     BRONCHOSCOPY FLEXIBLE  2013    " "related to carcinoid;  Flexible Bronchoscopy, Endobronchial Flexible Ultrasound;  Surgeon: Chris Sal MD;  Location[facility]     CHOLECYSTECTOMY       ENDOBRONCHIAL ULTRASOUND FLEXIBLE  11/01/2013    Procedure: ENDOBRONCHIAL ULTRASOUND FLEXIBLE;;  Surgeon: Chris Sal MD;  Location: [facility] OR     GASTRIC BYPASS  2003     HERNIA REPAIR      2011, supraumbilical     HERNIORRHAPHY INCISIONAL (LOCATION)  08/19/2011    Procedure:HERNIORRHAPHY INCISIONAL (LOCATION); Incisional Hernia Repair upper abdomen midline       LUNG LOBECTOMY      for carcinoid, done at [place]     REVISION DUNG-EN-Y       TONSILLECTOMY  age 7     ZZC REMOVAL OF LUNG,BILOBECTOMY Right 11/22/2013    Carcinoid: Right Middle and Lower Lobe       Problem List     Patient Active Problem List   Diagnosis     Obesity, Class III, BMI 40-49.9 (morbid obesity) (H)     CARDIOVASCULAR SCREENING; LDL GOAL LESS THAN 160     Malignant carcinoid tumor (H)     Morbid obesity, unspecified obesity type (H)     Hx of gastric bypass     Excessive bleeding in premenopausal period     White coat syndrome without diagnosis of hypertension     Nipple discharge in female     Postoperative malabsorption     Secondary hyperparathyroidism, non-renal (H)     Hyperlipidemia LDL goal <70     Prediabetes     Medications     [unfilled]  Surgical History     Past Surgical History  She has a past surgical history that includes gastric bypass (2003); tonsillectomy (age 7); Herniorrhaphy incisional (location) (08/19/2011); REMOVAL OF LUNG,BILOBECTOMY (Right, 11/22/2013); Bronchoscopy flexible (11/01/2013); Endobronchial ultrasound flexible (11/01/2013); Revision Dung-En-Y; hernia repair; Cholecystectomy; and Lung Lobectomy.    Objective-Exam     Constitutional:  Ht 1.594 m (5' 2.75\")   Wt 114.8 kg (253 lb)   BMI 45.17 kg/m    [unfilled]   General:  Pleasant and in no acute distress   Eyes:  EOMI  ENT:  Airway patent    Neck:  Respiratory: Normal " respiratory effort, no cough, .  CV:    Gastrointestinal: obese  Musculoskeletal: muscle mass WNL  Skin: color without pallor,  hair thick/long,   Psychiatric: alert and oriented X3, mood and affect normal    Counseling     We reviewed the important post op bariatric recommendations:  -eating 3 meals daily  -eating protein first, getting >60gm protein daily  -eating slowly, chewing food well  -avoiding/limiting calorie containing beverages  -drinking water 15-30 minutes before or after meals  -limiting restaurant or cafeteria eating to twice a week or less    We discussed the importance of restorative sleep and stress management in maintaining a healthy weight.  We discussed the National Weight Control Registry healthy weight maintenance strategies and ways to optimize metabolism.  We discussed the importance of physical activity including cardiovascular and strength training in maintaining a healthier weight.    We discussed the importance of life-long vitamin supplementation and life-long  follow-up.    Isreal was reminded that, to avoid marginal ulcers she should avoid tobacco at all, alcohol in excess, caffeine in excess, and NSAIDS (unless indicated for cardioprotection or othewise and opposed by a PPI).    Shon Chand MD    [facility].  2023  10:38 AM  [phone] (clinic phone)  [phone] (fax)    No images are attached to the encounter.  Medical Decision Makin minutes spent on the date of the encounter doing chart review, history and exam, documentation and further activities per the note

## 2023-01-19 NOTE — PROGRESS NOTES
Israel Zabala is 42 year old  female who presents for a billable video visit today.    How would you like to obtain your AVS? MyChart  If dropped from the video visit, the video invitation should be resent by: Text to cell phone: [phone]  Will anyone else be joining your video visit? No      Video Start Time: 3:00pm    Are there any specific questions or needs that you would like addressed at your visit today? Return MWM - doing well, no concerns     Provider Notes: see my note      Video-Visit Details    Type of service:  Video Visit    Platform used for Video Visit: Futurederm    Video End Time (time video stopped): 3:33 PM    Originating Location (pt. Location): Home        Distant Location (provider location):  Off-site    Distant Location (provider location):  [facility] [place]

## 2023-01-19 NOTE — PATIENT INSTRUCTIONS
Plan:  1.  Check vitamin labs this month at your convenience. B12 levels looked good at current intake on your 1/12/23 labs.    2. Given iron intolerance lately, check levels as you may be replete now. If still low ferritin or iron, consider using non iron complete multivitamin (Equate 50plus for example) and use a Vitamin Code: healthy blood daily to supplement iron needs (1-2 daily typically).      3. Continue ozempic if tolerated for now. Weight is coming down. Remember to get meals in through the day. Ideally starting no later than 7-9am and then getting meals every 4-5 hours through the day. Start with protein portion first, chew thoroughly and separate beverages from meals by 20-30 minutes to optimize intake/saitey and appetite control through the evening.    4. Great work with treadmill walks, ramping up from your 20-25 minuites to at least one 35-70minute walk weekly would be helpful. Some strength training/manual labor 2-3 days weekly would help muscle mass/metabollic rate.    5. Recheck with me in 4-6 months on ozempic tolerance.  If struggling to get meals in/options then set up follow up visit with dietician.    6. I'll message results of your vitamin labs when all tests are back (about 10 days after they're drawn).

## 2023-05-25 DIAGNOSIS — R73.03 BORDERLINE DIABETES: Primary | ICD-10-CM

## 2023-05-25 DIAGNOSIS — E23.6 EMPTY SELLA (H): ICD-10-CM

## 2023-05-25 NOTE — PROGRESS NOTES
Will increase dose of ozempic to 1mg/week given her diabetes and class III obesity. Will look to get BMI under 40 w/ likely improve diabetes further with goal of getting BMI under 35 long term to likely place diabetes in full remission off medications.   Lab Results   Component Value Date    A1C 5.5 01/12/2023    A1C 5.7 04/08/2022    A1C 6.6 01/11/2022    A1C 6.2 12/18/2020    A1C 6.4 12/27/2018    A1C 6.4 12/27/2018    A1C 5.8 11/09/2016    A1C 5.7 10/29/2014     Shon Chand MD

## 2023-06-01 ENCOUNTER — HEALTH MAINTENANCE LETTER (OUTPATIENT)
Age: 43
End: 2023-06-01

## 2023-09-02 ENCOUNTER — HEALTH MAINTENANCE LETTER (OUTPATIENT)
Age: 43
End: 2023-09-02

## 2023-10-30 DIAGNOSIS — R73.03 BORDERLINE DIABETES: ICD-10-CM

## 2023-11-02 ENCOUNTER — IMMUNIZATION (OUTPATIENT)
Dept: FAMILY MEDICINE | Facility: CLINIC | Age: 43
End: 2023-11-02
Payer: COMMERCIAL

## 2023-11-02 PROCEDURE — 90471 IMMUNIZATION ADMIN: CPT

## 2023-11-02 PROCEDURE — 90686 IIV4 VACC NO PRSV 0.5 ML IM: CPT

## 2023-11-03 DIAGNOSIS — R73.03 BORDERLINE DIABETES: ICD-10-CM

## 2023-12-18 ENCOUNTER — PATIENT OUTREACH (OUTPATIENT)
Dept: CARE COORDINATION | Facility: CLINIC | Age: 43
End: 2023-12-18
Payer: COMMERCIAL

## 2024-01-10 ASSESSMENT — ENCOUNTER SYMPTOMS
DIZZINESS: 0
EYE PAIN: 0
SORE THROAT: 0
WEAKNESS: 0
DYSURIA: 0
HEMATURIA: 0
PARESTHESIAS: 0
HEARTBURN: 1
CONSTIPATION: 0
CHILLS: 0
HEMATOCHEZIA: 0
ARTHRALGIAS: 0
MYALGIAS: 0
PALPITATIONS: 0
NAUSEA: 0
ABDOMINAL PAIN: 0
FEVER: 0
JOINT SWELLING: 0
BREAST MASS: 0
FREQUENCY: 0
HEADACHES: 0
NERVOUS/ANXIOUS: 0
DIARRHEA: 0
SHORTNESS OF BREATH: 0
COUGH: 0

## 2024-01-11 ENCOUNTER — OFFICE VISIT (OUTPATIENT)
Dept: FAMILY MEDICINE | Facility: CLINIC | Age: 44
End: 2024-01-11
Payer: COMMERCIAL

## 2024-01-11 ENCOUNTER — HOSPITAL ENCOUNTER (OUTPATIENT)
Dept: MAMMOGRAPHY | Facility: CLINIC | Age: 44
Discharge: HOME | End: 2024-01-11
Attending: INTERNAL MEDICINE | Admitting: INTERNAL MEDICINE
Payer: COMMERCIAL

## 2024-01-11 ENCOUNTER — ORDERS ONLY (AUTO-RELEASED) (OUTPATIENT)
Dept: FAMILY MEDICINE | Facility: CLINIC | Age: 44
End: 2024-01-11

## 2024-01-11 VITALS
WEIGHT: 257 LBS | HEART RATE: 82 BPM | SYSTOLIC BLOOD PRESSURE: 139 MMHG | OXYGEN SATURATION: 99 % | TEMPERATURE: 97.3 F | RESPIRATION RATE: 14 BRPM | BODY MASS INDEX: 47.29 KG/M2 | DIASTOLIC BLOOD PRESSURE: 78 MMHG | HEIGHT: 62 IN

## 2024-01-11 DIAGNOSIS — Z12.11 SCREEN FOR COLON CANCER: ICD-10-CM

## 2024-01-11 DIAGNOSIS — Z12.31 VISIT FOR SCREENING MAMMOGRAM: ICD-10-CM

## 2024-01-11 DIAGNOSIS — E66.01 OBESITY, CLASS III, BMI 40-49.9 (MORBID OBESITY) (H): ICD-10-CM

## 2024-01-11 DIAGNOSIS — R03.0 WHITE COAT SYNDROME WITHOUT DIAGNOSIS OF HYPERTENSION: ICD-10-CM

## 2024-01-11 DIAGNOSIS — R73.03 PREDIABETES: ICD-10-CM

## 2024-01-11 DIAGNOSIS — C7A.00 MALIGNANT CARCINOID TUMOR, UNSPECIFIED SITE (H): ICD-10-CM

## 2024-01-11 DIAGNOSIS — L02.92 RECURRENT BOILS: ICD-10-CM

## 2024-01-11 DIAGNOSIS — Z00.01 ENCOUNTER FOR ROUTINE ADULT PHYSICAL EXAM WITH ABNORMAL FINDINGS: Primary | ICD-10-CM

## 2024-01-11 DIAGNOSIS — E78.5 HYPERLIPIDEMIA LDL GOAL <70: ICD-10-CM

## 2024-01-11 DIAGNOSIS — K91.2 POSTOPERATIVE MALABSORPTION: ICD-10-CM

## 2024-01-11 LAB
ALBUMIN SERPL BCG-MCNC: 4.4 G/DL (ref 3.5–5.2)
ALP SERPL-CCNC: 116 U/L (ref 40–150)
ALT SERPL W P-5'-P-CCNC: 42 U/L (ref 0–50)
ANION GAP SERPL CALCULATED.3IONS-SCNC: 10 MMOL/L (ref 7–15)
AST SERPL W P-5'-P-CCNC: 31 U/L (ref 0–45)
BILIRUB SERPL-MCNC: 0.5 MG/DL
BUN SERPL-MCNC: 12.3 MG/DL (ref 6–20)
CALCIUM SERPL-MCNC: 10 MG/DL (ref 8.6–10)
CHLORIDE SERPL-SCNC: 107 MMOL/L (ref 98–107)
CHOLEST SERPL-MCNC: 163 MG/DL
CREAT SERPL-MCNC: 0.69 MG/DL (ref 0.51–0.95)
DEPRECATED HCO3 PLAS-SCNC: 26 MMOL/L (ref 22–29)
EGFRCR SERPLBLD CKD-EPI 2021: >90 ML/MIN/1.73M2
FASTING STATUS PATIENT QL REPORTED: YES
GLUCOSE SERPL-MCNC: 106 MG/DL (ref 70–99)
HBA1C MFR BLD: 5.7 % (ref 0–5.6)
HDLC SERPL-MCNC: 69 MG/DL
LDLC SERPL CALC-MCNC: 78 MG/DL
NONHDLC SERPL-MCNC: 94 MG/DL
POTASSIUM SERPL-SCNC: 4.6 MMOL/L (ref 3.4–5.3)
PROT SERPL-MCNC: 7.7 G/DL (ref 6.4–8.3)
PTH-INTACT SERPL-MCNC: 43 PG/ML (ref 15–65)
SODIUM SERPL-SCNC: 143 MMOL/L (ref 135–145)
TRIGL SERPL-MCNC: 78 MG/DL
TSH SERPL DL<=0.005 MIU/L-ACNC: 1.6 UIU/ML (ref 0.3–4.2)
VIT B12 SERPL-MCNC: 1809 PG/ML (ref 232–1245)
VIT D+METAB SERPL-MCNC: 56 NG/ML (ref 20–50)

## 2024-01-11 PROCEDURE — 82306 VITAMIN D 25 HYDROXY: CPT | Performed by: NURSE PRACTITIONER

## 2024-01-11 PROCEDURE — 84425 ASSAY OF VITAMIN B-1: CPT | Mod: 90 | Performed by: NURSE PRACTITIONER

## 2024-01-11 PROCEDURE — 82607 VITAMIN B-12: CPT | Performed by: NURSE PRACTITIONER

## 2024-01-11 PROCEDURE — 80061 LIPID PANEL: CPT | Performed by: NURSE PRACTITIONER

## 2024-01-11 PROCEDURE — 77063 BREAST TOMOSYNTHESIS BI: CPT

## 2024-01-11 PROCEDURE — 99000 SPECIMEN HANDLING OFFICE-LAB: CPT | Performed by: NURSE PRACTITIONER

## 2024-01-11 PROCEDURE — 99214 OFFICE O/P EST MOD 30 MIN: CPT | Mod: 25 | Performed by: NURSE PRACTITIONER

## 2024-01-11 PROCEDURE — 83036 HEMOGLOBIN GLYCOSYLATED A1C: CPT | Performed by: NURSE PRACTITIONER

## 2024-01-11 PROCEDURE — 36415 COLL VENOUS BLD VENIPUNCTURE: CPT | Performed by: NURSE PRACTITIONER

## 2024-01-11 PROCEDURE — 83970 ASSAY OF PARATHORMONE: CPT | Performed by: NURSE PRACTITIONER

## 2024-01-11 PROCEDURE — 84443 ASSAY THYROID STIM HORMONE: CPT | Performed by: NURSE PRACTITIONER

## 2024-01-11 PROCEDURE — 80053 COMPREHEN METABOLIC PANEL: CPT | Performed by: NURSE PRACTITIONER

## 2024-01-11 PROCEDURE — 99396 PREV VISIT EST AGE 40-64: CPT | Performed by: NURSE PRACTITIONER

## 2024-01-11 RX ORDER — ATORVASTATIN CALCIUM 10 MG/1
10 TABLET, FILM COATED ORAL DAILY
Qty: 90 TABLET | Refills: 3 | Status: SHIPPED | OUTPATIENT
Start: 2024-01-11

## 2024-01-11 RX ORDER — MUPIROCIN 20 MG/G
OINTMENT TOPICAL 3 TIMES DAILY
Qty: 30 G | Refills: 0 | Status: SHIPPED | OUTPATIENT
Start: 2024-01-11

## 2024-01-11 ASSESSMENT — ENCOUNTER SYMPTOMS
FEVER: 0
CONSTIPATION: 0
SORE THROAT: 0
ARTHRALGIAS: 0
COUGH: 0
SHORTNESS OF BREATH: 0
PALPITATIONS: 0
MYALGIAS: 0
FREQUENCY: 0
DIARRHEA: 0
EYE PAIN: 0
HEADACHES: 0
NERVOUS/ANXIOUS: 0
ABDOMINAL PAIN: 0
WEAKNESS: 0
HEARTBURN: 1
DIZZINESS: 0
NAUSEA: 0
DYSURIA: 0
JOINT SWELLING: 0
BREAST MASS: 0
CHILLS: 0
HEMATURIA: 0
PARESTHESIAS: 0
HEMATOCHEZIA: 0

## 2024-01-11 ASSESSMENT — PAIN SCALES - GENERAL: PAINLEVEL: NO PAIN (0)

## 2024-01-11 NOTE — PROGRESS NOTES
SUBJECTIVE:   Israel is a 43 year old, presenting for the following:  Physical        1/11/2024     9:05 AM   Additional Questions   Roomed by Gunjan   Accompanied by self       Healthy Habits:     Getting at least 3 servings of Calcium per day:  Yes    Bi-annual eye exam:  Yes    Dental care twice a year:  Yes    Sleep apnea or symptoms of sleep apnea:  None    Diet:  Regular (no restrictions)    Frequency of exercise:  2-3 days/week    Duration of exercise:  15-30 minutes    Taking medications regularly:  Yes    Medication side effects:  None    Additional concerns today:  Yes      Today's PHQ-2 Score:       1/10/2024    11:58 AM   PHQ-2 ( 1999 [employer])   Q1: Little interest or pleasure in doing things 0   Q2: Feeling down, depressed or hopeless 0   PHQ-2 Score 0   Q1: Little interest or pleasure in doing things Not at all   Q2: Feeling down, depressed or hopeless Not at all   PHQ-2 Score 0       Irrational periods  Started perimenopause a few years ago  Now periods big clots with the heavy day  Flees irritable with anxiety    Eye exam in [place] [facility] Sams yearly  No problems noted.   June 1 2023    Semiglutide 1 mg   Going well no side effects    Weight maintained for the last year  Lab Results   Component Value Date    A1C 5.5 01/12/2023    A1C 5.7 04/08/2022    A1C 6.6 01/11/2022    A1C 6.2 12/18/2020    A1C 6.4 12/27/2018    A1C 6.4 12/27/2018    A1C 5.8 11/09/2016    A1C 5.7 10/29/2014     Wt Readings from Last 5 Encounters:   01/11/24 116.6 kg (257 lb)   01/19/23 114.8 kg (253 lb)   01/12/23 115.7 kg (255 lb)   12/01/22 117.5 kg (259 lb)   09/14/22 115.7 kg (255 lb)     2003 Dung in Y  BP at work 120's/70's  COVID , HAND FOOT AND MOUTH and SHINGLES last year    Boil recurrent left buttocks    Carcinoid right lung .. Rodriguez... 10 yrs ago removed. No problems since then            Hyperlipidemia Follow-Up    Are you regularly taking any medication or supplement to lower your cholesterol?   No  Are you  having muscle aches or other side effects that you think could be caused by your cholesterol lowering medication?  No      Social History     Tobacco Use    Smoking status: Former     Packs/day: 0.10     Years: 8.00     Additional pack years: 0.00     Total pack years: 0.80     Types: Cigarettes     Quit date: 9/26/2008     Years since quitting: 15.3    Smokeless tobacco: Never   Substance Use Topics    Alcohol use: Yes     Comment: occasional             1/10/2024    11:58 AM   Alcohol Use   Prescreen: >3 drinks/day or >7 drinks/week? No          No data to display              Reviewed orders with patient.  Reviewed health maintenance and updated orders accordingly -   Labs reviewed in EPIC  BP Readings from Last 3 Encounters:   01/11/24 139/78   01/12/23 126/80   12/01/22 (!) 141/83    Wt Readings from Last 3 Encounters:   01/11/24 116.6 kg (257 lb)   01/19/23 114.8 kg (253 lb)   01/12/23 115.7 kg (255 lb)                  Patient Active Problem List   Diagnosis    Obesity, Class III, BMI 40-49.9 (morbid obesity) (H)    CARDIOVASCULAR SCREENING; LDL GOAL LESS THAN 160    Malignant carcinoid tumor (H)    Morbid obesity, unspecified obesity type (H)    Hx of gastric bypass    Excessive bleeding in premenopausal period    White coat syndrome without diagnosis of hypertension    Nipple discharge in female    Postoperative malabsorption    Secondary hyperparathyroidism, non-renal (H24)    Hyperlipidemia LDL goal <70    Prediabetes     Past Surgical History:   Procedure Laterality Date    BRONCHOSCOPY FLEXIBLE  11/01/2013    related to carcinoid;  Flexible Bronchoscopy, Endobronchial Flexible Ultrasound;  Surgeon: Chris Sal MD;  Location: [facility] OR    CHOLECYSTECTOMY      ENDOBRONCHIAL ULTRASOUND FLEXIBLE  11/01/2013    Procedure: ENDOBRONCHIAL ULTRASOUND FLEXIBLE;;  Surgeon: Chris Sal MD;  Location: [facility] OR    GASTRIC BYPASS  2003    HERNIA REPAIR      2011, supraumbilical    HERNIORRHAPHY  INCISIONAL (LOCATION)  08/19/2011    Procedure:HERNIORRHAPHY INCISIONAL (LOCATION); Incisional Hernia Repair upper abdomen midline      LUNG LOBECTOMY      for carcinoid, done at [place]    REVISION KATHY-EN-Y      TONSILLECTOMY  age 7    ZZC REMOVAL OF LUNG,BILOBECTOMY Right 11/22/2013    Carcinoid: Right Middle and Lower Lobe       Social History     Tobacco Use    Smoking status: Former     Packs/day: 0.10     Years: 8.00     Additional pack years: 0.00     Total pack years: 0.80     Types: Cigarettes     Quit date: 9/26/2008     Years since quitting: 15.3    Smokeless tobacco: Never   Substance Use Topics    Alcohol use: Yes     Comment: occasional     Family History   Problem Relation Age of Onset    Breast Cancer Maternal Grandmother 65    Cancer Maternal Grandmother         skin    Diabetes Maternal Grandmother     Hypertension Maternal Grandmother     Diabetes Maternal Grandfather     Heart Disease Paternal Grandmother         MI    Cerebrovascular Disease Paternal Grandmother     Heart Disease Paternal Grandfather         MI    Gastrointestinal Disease Brother         Colitis    Prostate Cancer Father     Hyperlipidemia Father     Cancer Mother         skin    Hypertension Mother     Congenital Anomalies Daughter         imperforate anus         Current Outpatient Medications   Medication Sig Dispense Refill    atorvastatin (LIPITOR) 10 MG tablet Take 1 tablet (10 mg) by mouth daily 90 tablet 3    Calcium Citrate-Vitamin D (CALCIUM CITRATE +D PO) Take 500 mg by mouth daily      Cholecalciferol (VITAMIN D) 2000 UNITS tablet Take 1 tablet by mouth daily      ferrous sulfate (IRON) 325 (65 FE) MG tablet Take 1 tablet (325 mg) by mouth 2 times daily 60 tablet 2    Multiple Vitamins-Minerals (MULTIVITAMIN PO)       mupirocin (BACTROBAN) 2 % external ointment Apply topically 3 times daily 30 g 0    Semaglutide, 1 MG/DOSE, (OZEMPIC) 4 MG/3ML pen Inject 1 mg Subcutaneous every 7 days 9 mL 1    Semaglutide, 1 MG/DOSE,  (OZEMPIC) 4 MG/3ML pen Inject 1 mg Subcutaneous every 7 days 9 mL 1    VITAMIN B-12 PO 1 TABLET ORALLY DAILY(HOME MED)       No Known Allergies    Breast Cancer Screenin/11/2022    10:13 AM 2022    11:07 AM   Breast CA Risk Assessment (FHS-7)   Do you have a family history of breast, colon, or ovarian cancer? Yes No / Unknown         Mammogram Screening: Recommended annual mammography  Pertinent mammograms are reviewed under the imaging tab.    History of abnormal Pap smear: Last 3 Pap and HPV Results:       Latest Ref Rng & Units 2022    10:14 AM 2018     8:20 AM 2018     8:18 AM   PAP / HPV   PAP  Negative for Intraepithelial Lesion or Malignancy (NILM)      PAP (Historical)    NIL    HPV 16 DNA Negative Negative  Negative     HPV 18 DNA Negative Negative  Negative     Other HR HPV Negative Negative  Negative           Latest Ref Rng & Units 2022    10:14 AM 2018     8:20 AM 2018     8:18 AM   PAP / HPV   PAP  Negative for Intraepithelial Lesion or Malignancy (NILM)      PAP (Historical)    NIL    HPV 16 DNA Negative Negative  Negative     HPV 18 DNA Negative Negative  Negative     Other HR HPV Negative Negative  Negative       Reviewed and updated as needed this visit by clinical staff   Tobacco  Allergies  Meds              Reviewed and updated as needed this visit by Provider  LUNG CANCER HISTORY   follow up at [place]   for surveillance management of her pulmonary carcinoid originally diagnosed and resected in . She currently feels well without any complaints. Denies any fevers, chills, night sweats weight loss or shortness of breath. She is able to ambulate without assistance.      Past Medical History:   Diagnosis Date    Abnormal glandular Papanicolaou smear of cervix     Negative colpo per patient    Cancer (H) 2013    Chickenpox     Chronic maxillary sinusitis 2006    CTS (carpal tunnel syndrome) 2014    Situational anxiety  2012    Related to her cancer diagnosis      Past Surgical History:   Procedure Laterality Date    BRONCHOSCOPY FLEXIBLE  2013    related to carcinoid;  Flexible Bronchoscopy, Endobronchial Flexible Ultrasound;  Surgeon: Chris Sal MD;  Location: [facility]    CHOLECYSTECTOMY      ENDOBRONCHIAL ULTRASOUND FLEXIBLE  2013    Procedure: ENDOBRONCHIAL ULTRASOUND FLEXIBLE;;  Surgeon: Chris Sal MD;  Location: [facility] OR    GASTRIC BYPASS  2003    HERNIA REPAIR      , supraumbilical    HERNIORRHAPHY INCISIONAL (LOCATION)  2011    Procedure:HERNIORRHAPHY INCISIONAL (LOCATION); Incisional Hernia Repair upper abdomen midline      LUNG LOBECTOMY      for carcinoid, done at [facility]      TONSILLECTOMY  age 7    ZZC REMOVAL OF LUNG,BILOBECTOMY Right 2013    Carcinoid: Right Middle and Lower Lobe     OB History    Para Term  AB Living   2 2 2 0 0 2   SAB IAB Ectopic Multiple Live Births   0 0 0 0 2      # Outcome Date GA Lbr Jcarlos/2nd Weight Sex Delivery Anes PTL Lv   2 Term 10/31/12 38w6d 03:28 / 00:12 3.742 kg (8 lb 4 oz) F Vag-Spont INT  EAMON      Birth Comments: 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   HENT:  Negative for congestion, ear pain, hearing loss and sore throat.    Eyes:  Negative for pain and visual disturbance.   Respiratory:  Negative for cough and shortness of breath.    Cardiovascular:  Negative for chest pain, palpitations and peripheral edema.   Gastrointestinal:  Positive for heartburn.  "Negative for abdominal pain, constipation, diarrhea, hematochezia and nausea.   Breasts:  Negative for tenderness, breast mass and discharge.   Genitourinary:  Negative for dysuria, frequency, genital sores, hematuria, pelvic pain, urgency, vaginal bleeding and vaginal discharge.   Musculoskeletal:  Negative for arthralgias, joint swelling and myalgias.   Skin:  Negative for rash.   Neurological:  Negative for dizziness, weakness, headaches and paresthesias.   Psychiatric/Behavioral:  Negative for mood changes. The patient is not nervous/anxious.           OBJECTIVE:   /78   Pulse 82   Temp 97.3  F (36.3  C) (Tympanic)   Resp 14   Ht 1.575 m (5' 2\")   Wt 116.6 kg (257 lb)   LMP 11/05/2023   SpO2 99%   BMI 47.01 kg/m    Physical Exam  GENERAL: alert and no distress  EYES: Eyes grossly normal to inspection, PERRL and conjunctivae and sclerae normal  HENT: ear canals and TM's normal, nose and mouth without ulcers or lesions  NECK: no adenopathy, no asymmetry, masses, or scars  RESP: lungs clear to auscultation - no rales, rhonchi or wheezes  CV: regular rate and rhythm, normal S1 S2, no S3 or S4, no murmur, click or rub, no peripheral edema  ABDOMEN: soft, nontender, no hepatosplenomegaly, no masses and bowel sounds normal  MS: no gross musculoskeletal defects noted, no edema  SKIN: no suspicious lesions or rashes  NEURO: Normal strength and tone, mentation intact and speech normal  PSYCH: mentation appears normal, affect normal/bright  LYMPH: no cervical, supraclavicular, axillary, or inguinal adenopathy    Diagnostic Test Results:  Labs reviewed in Epic  Results for orders placed or performed during the hospital encounter of 01/11/24   MA Screening Bilateral w/ Mendez     Status: None    Narrative    BILATERAL FULL FIELD DIGITAL SCREENING MAMMOGRAM WITH TOMOSYNTHESIS    Performed on: 1/11/24    Compared to: 01/11/2023, 01/13/2022, and 01/12/2021    Technique:  This study was evaluated with the assistance " of Computer-Aided   Detection.  Breast Tomosynthesis was used in interpretation.    Findings: The breasts have scattered areas of fibroglandular density.    There is no radiographic evidence of malignancy.     Impression    IMPRESSION: ACR BI-RADS Category 1: Negative    RECOMMENDED FOLLOW-UP: Annual routine screening mammogram    The results and recommendations of this examination will be communicated   to the patient.        Quoc Lang MD     Results for orders placed or performed in visit on 01/11/24   25- OH-Vitamin D     Status: Abnormal   Result Value Ref Range    Vitamin D, Total (25-Hydroxy) 56 (H) 20 - 50 ng/mL    Narrative    Season, race, dietary intake, and treatment affect the concentration of 25-hydroxy-Vitamin D. Values may decrease during winter months and increase during summer months.    Vitamin D determination is routinely performed by an immunoassay specific for 25 hydroxyvitamin D3.  If an individual is on vitamin D2(ergocalciferol) supplementation, please specify 25 OH vitamin D2 and D3 level determination by LCMSMS test VITD23.     Vitamin B1 whole blood     Status: None   Result Value Ref Range    Vitamin B1 Whole Blood Level 141 70 - 180 nmol/L   Parathyroid Hormone Intact     Status: Normal   Result Value Ref Range    Parathyroid Hormone Intact 43 15 - 65 pg/mL    Narrative    This result was obtained with the Roche Elecsys PTH STAT assay.   This reference range differs from PTH assays used in other [facility] laboratories.   HEMOGLOBIN A1C     Status: Abnormal   Result Value Ref Range    Hemoglobin A1C 5.7 (H) 0.0 - 5.6 %   Lipid panel reflex to direct LDL Non-fasting     Status: None   Result Value Ref Range    Cholesterol 163 <200 mg/dL    Triglycerides 78 <150 mg/dL    Direct Measure HDL 69 >=50 mg/dL    LDL Cholesterol Calculated 78 <=100 mg/dL    Non HDL Cholesterol 94 <130 mg/dL    Patient Fasting > 8hrs? Yes     Narrative    Cholesterol  Desirable:  <200  mg/dL    Triglycerides  Normal:  Less than 150 mg/dL  Borderline High:  150-199 mg/dL  High:  200-499 mg/dL  Very High:  Greater than or equal to 500 mg/dL    Direct Measure HDL  Female:  Greater than or equal to 50 mg/dL   Male:  Greater than or equal to 40 mg/dL    LDL Cholesterol  Desirable:  <100mg/dL  Above Desirable:  100-129 mg/dL   Borderline High:  130-159 mg/dL   High:  160-189 mg/dL   Very High:  >= 190 mg/dL    Non HDL Cholesterol  Desirable:  130 mg/dL  Above Desirable:  130-159 mg/dL  Borderline High:  160-189 mg/dL  High:  190-219 mg/dL  Very High:  Greater than or equal to 220 mg/dL   Comprehensive metabolic panel (BMP + Alb, Alk Phos, ALT, AST, Total. Bili, TP)     Status: Abnormal   Result Value Ref Range    Sodium 143 135 - 145 mmol/L    Potassium 4.6 3.4 - 5.3 mmol/L    Carbon Dioxide (CO2) 26 22 - 29 mmol/L    Anion Gap 10 7 - 15 mmol/L    Urea Nitrogen 12.3 6.0 - 20.0 mg/dL    Creatinine 0.69 0.51 - 0.95 mg/dL    GFR Estimate >90 >60 mL/min/1.73m2    Calcium 10.0 8.6 - 10.0 mg/dL    Chloride 107 98 - 107 mmol/L    Glucose 106 (H) 70 - 99 mg/dL    Alkaline Phosphatase 116 40 - 150 U/L    AST 31 0 - 45 U/L    ALT 42 0 - 50 U/L    Protein Total 7.7 6.4 - 8.3 g/dL    Albumin 4.4 3.5 - 5.2 g/dL    Bilirubin Total 0.5 <=1.2 mg/dL   Vitamin B12     Status: Abnormal   Result Value Ref Range    Vitamin B12 1,809 (H) 232 - 1,245 pg/mL   TSH with free T4 reflex     Status: Normal   Result Value Ref Range    TSH 1.60 0.30 - 4.20 uIU/mL       ASSESSMENT/PLAN:   Israel was seen today for physical.    Diagnoses and all orders for this visit:    Encounter for routine adult physical exam with abnormal findings    Screen for colon cancer  -     COLOGUARD(EXACT SCIENCES); Future    Prediabetes  -     HEMOGLOBIN A1C; Future  -     Albumin Random Urine Quantitative with Creat Ratio; Future  Prevention strategies reviewed     White coat syndrome without diagnosis of hypertension  -     Comprehensive metabolic panel  "(BMP + Alb, Alk Phos, ALT, AST, Total. Bili, TP); Future  -     Vitamin B12; Future  Monitor and record BP weekly  Goal < 140/90 reviewed     Hyperlipidemia LDL goal <70  -     Lipid panel reflex to direct LDL Non-fasting; Future  -     Albumin Random Urine Quantitative with Creat Ratio; Future  -     atorvastatin (LIPITOR) 10 MG tablet; Take 1 tablet (10 mg) by mouth daily  -     Comprehensive metabolic panel (BMP + Alb, Alk Phos, ALT, AST, Total. Bili, TP); Future  -     Vitamin B12; Future  -     Lipid panel reflex to direct LDL Non-fasting  -     Comprehensive metabolic panel (BMP + Alb, Alk Phos, ALT, AST, Total. Bili, TP)  -     Vitamin B12    Malignant carcinoid tumor, Lung right   Follow up with [facility] as planned.       Obesity, Class III, BMI 40-49.9 (morbid obesity) (H)    -     TSH with free T4 reflex    Recurrent boils  -     mupirocin (BACTROBAN) 2 % external ointment; Apply topically 3 times daily    -     TSH with free T4 reflex    Postoperative malabsorption  -     25- OH-Vitamin D  -     Vitamin B1 whole blood  -     Parathyroid Hormone Intact    Other orders  -     REVIEW OF HEALTH MAINTENANCE PROTOCOL ORDERS              COUNSELING:  Reviewed preventive health counseling, as reflected in patient instructions      BMI:   Estimated body mass index is 47.01 kg/m  as calculated from the following:    Height as of this encounter: 1.575 m (5' 2\").    Weight as of this encounter: 116.6 kg (257 lb).   Weight management plan: Discussed healthy diet and exercise guidelines      She reports that she quit smoking about 15 years ago. Her smoking use included cigarettes. She has a 0.8 pack-year smoking history. She has never used smokeless tobacco.      Call or return to the clinic with any worsening of symptoms or no resolution. Patient/Parent verbalized understanding and is in agreement. Medication side effects reviewed.   Current Outpatient Medications   Medication Sig Dispense Refill    atorvastatin " (LIPITOR) 10 MG tablet Take 1 tablet (10 mg) by mouth daily 90 tablet 3    Calcium Citrate-Vitamin D (CALCIUM CITRATE +D PO) Take 500 mg by mouth daily      Cholecalciferol (VITAMIN D) 2000 UNITS tablet Take 1 tablet by mouth daily      ferrous sulfate (IRON) 325 (65 FE) MG tablet Take 1 tablet (325 mg) by mouth 2 times daily 60 tablet 2    Multiple Vitamins-Minerals (MULTIVITAMIN PO)       mupirocin (BACTROBAN) 2 % external ointment Apply topically 3 times daily 30 g 0    Semaglutide, 1 MG/DOSE, (OZEMPIC) 4 MG/3ML pen Inject 1 mg Subcutaneous every 7 days 9 mL 1    Semaglutide, 1 MG/DOSE, (OZEMPIC) 4 MG/3ML pen Inject 1 mg Subcutaneous every 7 days 9 mL 1    VITAMIN B-12 PO 1 TABLET ORALLY DAILY(HOME MED)       Chart documentation with Dragon Voice recognition Software. Although reviewed after completion, some words and grammatical errors may remain.    GILDA [facility]

## 2024-01-12 NOTE — PROGRESS NOTES
URGENT CARE  Assessment & Plan   Assessment:   Israel Zabala is a 42 year old female who's clinical presentation today is consistent with:   1. Nephrolithiasis  - UA macro with reflex to Microscopic and Culture - Clinc Collect    No alarm signs or symptoms present   Differential Diagnoses for this patient's CC include    Bacterial vaginosis, candidiasis, Vulvovaginitis, atrophic vaginitis,    contact vs allergic vaginitis   STD: gonorrhea, chlamydia, trichomoniasis; PID   UTI, Vaginitis (inflammatory, irritative)\    Plan:  Urine analysis did not show any WBCs suggesting a UTI, but I am suspicious for nephrolithiasis and will treat patient for assumed kidney stone today. Discussed with the patient that we do not have CT or ultrasound here in urgent care for definite diagnosis and it is recommend they present to ED for stone confirmation and sizing at this time patient is refusing and thus educated patient that most stones will pass on their own (if <5mm), but that they need to follow up w/ their PCP in the next 2-3 days to be re-evaluated regarding stone expulsion.   Encouraged pain control today with tylenol and NSAIDs, discussed renal colic and the waxing and weaning of pain, also discussed possibility of ureteral stone getting stuck and not passing, and that this would present as severe pain lasting >2-3 hours and decreased urine output   Discussed w/ patient the importance of getting plenty of fluids and staying active to help the stone pass.   Educated patient to monitor for worsening condition such as: intense pain, new flank pain, fevers, blood clots in the urine, decreased urine output and foul smelling urine, encouraged them to present to the ED if they notice any of these signs.    Patient is agreeable to treatment plan and state they will follow-up if symptoms do not improve and/or if symptoms worsen (see patient's AVS 'monitor for' section for specific patient instructions given and discussed regarding  what to watch for and when to follow up)    Medications ordered are listed above, please see AVS for patient's specific and personalized discharge instructions given     GILDA Sarkar [facility]      ______________________________________________________________________        Subjective  Subjective     HPI: Isreal Zabala  is a 42 year old  female who presents today for evaluation the following concerns:   The patient presents today complaining of right sided flank pain for 2 days which started on 11/29/22  Patient states the pain waxes and wanes, comes and goes and can is severe   Patient denies any nausea of vomiting    Patient endorses some urinary symptoms such as: burning, pelvic pain, and sensation of incomplete bladder emptying or frequency   Patient endorses having a past history of one previous kidney stone  Patient denies fever, chills, or any abnormal vaginal discharge/odor or bleeding.      No Known Allergies  Patient Active Problem List   Diagnosis     Obesity, Class III, BMI 40-49.9 (morbid obesity) (H)     CARDIOVASCULAR SCREENING; LDL GOAL LESS THAN 160     Malignant carcinoid tumor (H)     Morbid obesity, unspecified obesity type (H)     Hx of gastric bypass     Excessive bleeding in premenopausal period     White coat syndrome without diagnosis of hypertension     Nipple discharge in female     Postoperative malabsorption     Secondary hyperparathyroidism, non-renal (H)     Hyperlipidemia LDL goal <70     Prediabetes       Review of Systems:  Pertinent review of systems as reflected in HPI, otherwise negative.     Objective  Objective    Physical Exam:  Vitals:    12/01/22 1231   BP: (!) 141/83   Pulse: 79   Temp: 97.9  F (36.6  C)   TempSrc: Tympanic   SpO2: 100%   Weight: 117.5 kg (259 lb)      General: Alert and oriented, no acute distress, afebrile, normotensive  Psy/mental status: Nonanxious, cooperative  SKIN: Intact, no open areas  ABDOMEN:  soft,  non-tender, non-distended    Right sided flank pain and CVA tenderness to palpation b  Pelvic/ :   Deferred per patient       LABS:   Results for orders placed or performed in visit on 12/01/22   UA macro with reflex to Microscopic and Culture - Clinc Collect     Status: Normal    Specimen: Urine, Clean Catch   Result Value Ref Range    Color Urine Yellow Colorless, Straw, Light Yellow, Yellow    Appearance Urine Clear Clear    Glucose Urine Negative Negative mg/dL    Bilirubin Urine Negative Negative    Ketones Urine Negative Negative mg/dL    Specific Gravity Urine 1.020 1.003 - 1.035    Blood Urine Negative Negative    pH Urine 5.5 5.0 - 7.0    Protein Albumin Urine Negative Negative mg/dL    Urobilinogen Urine 0.2 0.2, 1.0 E.U./dL    Nitrite Urine Negative Negative    Leukocyte Esterase Urine Negative Negative    Narrative    Microscopic not indicated       I explained my diagnostic considerations and recommendations to the patient, who voiced understanding and agreement with the treatment plan.   All questions were answered.   We discussed potential side effects, risks and benefits of any prescribed or recommended therapies, as well as expectations for response to treatments.  Please see AVS for any patient instructions & handouts given.   Patient was advised to contact the Nurse Care Line, their Primary Care provider, Urgent Care, or the Emergency Department if there are new or worsening symptoms, or call 911 for emergencies.        ______________________________________________________________________          Patient Instructions   Diagnosis: kidney stone _ nephrolithiasis     Plan:     Pain control: NSAIDs, tylenol      Follow up with PCp if able     Most stones will pass on their own     Concern with they are too big to pass and can become stuck in ureter - Emergency Room     Monitor for severe pain    Decreased urine output     Drink plenty of fluids. This means at least 12, 8-ounce glasses of  [FreeTextEntry1] : A case of kidney transplant with background hypertension, backache, sleep apnea, hyperlipidemia being followed for kidney function. During the last follow-up, there was an increase in serum creatinine to 3.28 mg/dl and decrease GFR to 22 ml/min. Serum K 3.1 mEq/L. Hb is 12 gm, and [place] level is 4.1 ng/ml. The patient was having severe backache and was on oxycodone and also taking aspirin 81 mg PO daily. T During the last follow-up, a mild increase in serum creatinine to 3.11 mg/dl and a decrease in GFR to 23 ml/min. Total serum cholesterol wsa 265 mg/dl, and LDL was 193 mg/dl. he patient has come for a follow-up.   fluid--mostly water--a day.    Try to stay as active as possible. This will help the stone pass.     Don't stay in bed unless your pain keeps you from getting up.     You may notice a red, pink, or brown color to your urine. This is normal while passing a kidney stone.    Need to follow up with PCP in 24-48 hours to reassess if stone is passing, vs renal speciality     Also discuss prevention of stone formation in the future     Monitor for:     Weakness, dizziness, or fainting    Intense pain that does not go away     Repeated vomiting or unable to keep down fluids    Fever of 101 F    Passage of solid red or brown urine (can't see through it) or urine with lots of blood clots    Foul-smelling or cloudy urine    Unable to pass urine for 8 hours and increasing bladder pressure        Kidney Stones Nephrolithiasis   The sharp cramping pain on either side of your lower back and nausea or vomiting that you have are because of a small stone that has formed in the kidney.   It's now passing down a narrow tube (ureter) on its way to your bladder.   Once the stone reaches your bladder, the pain will often stop.   But it may come back as the stone continues to pass out of the bladder and through the urethra.   The stone may pass in your urine stream in one piece.   The size may be 1/16 inch to 1/4 inch (1 mm to 6 mm). Or, the stone may break up into darrel fragments that you may not even notice.  Once you have had a kidney stone, you are at risk of getting another one in the future.   There are 4 types of kidney stones. 80% percent are calcium stones--mostly calcium oxalate but also some with calcium phosphate.   The other 3 types include uric acid stones, struvite stones (from a preceding infection), and rarely, cystine stones.  Most stones will pass on their own, but may take from a few hours to a few days.   Sometimes the stone is too large to pass by itself. In that case, the healthcare provider will need to use other  ways to remove the stone.

## 2024-01-17 LAB — VIT B1 PYROPHOSHATE BLD-SCNC: 141 NMOL/L

## 2024-01-31 LAB — NONINV COLON CA DNA+OCC BLD SCRN STL QL: NEGATIVE

## 2024-02-02 DIAGNOSIS — R73.03 BORDERLINE DIABETES: ICD-10-CM

## 2024-02-08 ENCOUNTER — TELEPHONE (OUTPATIENT)
Dept: SURGERY | Facility: CLINIC | Age: 44
End: 2024-02-08

## 2024-02-08 ENCOUNTER — VIRTUAL VISIT (OUTPATIENT)
Dept: SURGERY | Facility: CLINIC | Age: 44
End: 2024-02-08
Payer: COMMERCIAL

## 2024-02-08 VITALS — HEIGHT: 62 IN | BODY MASS INDEX: 47.11 KG/M2 | WEIGHT: 256 LBS

## 2024-02-08 DIAGNOSIS — R73.03 BORDERLINE DIABETES: ICD-10-CM

## 2024-02-08 DIAGNOSIS — Z98.84 HX OF GASTRIC BYPASS: ICD-10-CM

## 2024-02-08 DIAGNOSIS — E66.01 CLASS 3 SEVERE OBESITY DUE TO EXCESS CALORIES WITH SERIOUS COMORBIDITY AND BODY MASS INDEX (BMI) OF 45.0 TO 49.9 IN ADULT (H): Primary | ICD-10-CM

## 2024-02-08 DIAGNOSIS — E66.813 CLASS 3 SEVERE OBESITY DUE TO EXCESS CALORIES WITH SERIOUS COMORBIDITY AND BODY MASS INDEX (BMI) OF 45.0 TO 49.9 IN ADULT (H): Primary | ICD-10-CM

## 2024-02-08 DIAGNOSIS — K91.2 POSTOPERATIVE MALABSORPTION: ICD-10-CM

## 2024-02-08 PROCEDURE — 99214 OFFICE O/P EST MOD 30 MIN: CPT | Mod: 95 | Performed by: EMERGENCY MEDICINE

## 2024-02-08 ASSESSMENT — PAIN SCALES - GENERAL: PAINLEVEL: NO PAIN (0)

## 2024-02-08 NOTE — PATIENT INSTRUCTIONS
"Plan:  To optimize bariatric surgery, trying to get that first meal within 2-3 hours of waking, anchor meals in protein first approach, consider a worktime protein rich snack (yogurt and some nuts/seeds or piece of fruit with some cottage cheese) to get home feeling well fueld. Meals every 4-5 hours tends to work best after gastric bypass and continuing to separate beverages from meals.     2. Hydration goal of 75-85oz/day.    3. Vitamins:  you should switch to a \"complete\" women's one a day vitamin and use 2 daily. Reduce D3 to 5/7 days to reduce mild excess vitamin D. B12 can be 1000mcg sublingually twice weekly.  Calcium citrate once nightly, 500mg.    4. Ozempic may be mildly too strong: to see, you can count back 18 clicks from the 1mg marker and this should be close to 0.75mg/dose and do that the next month and then you can decide if that dose works better for your than the full 1mg/week dose. Let me know if still not feeling well and we can back off to the 0.5mg/week pen instead.     5. Check in with dietician this spring.    6. Med check with me in 4-6 months.       [facility]  Nutritional Guidelines  Gastric Bypass 18 Months Post Op and Beyond    General Guidelines and Helpful Hints:  Eat 3 meals per day + protein supplement(s). No snacks between meals.  Do not skip meals.  This can cause overeating at the next meal and will prevent adequate protein and nutritional intake.  Aim for 60-80 grams of protein per day.  Always eat your protein first. This assists with optimal nutrition and helps you stay full longer.  Depending on your portion size, you may need to drink approved protein supplement between meals to achieve protein goals. Follow recommendations of your Dietitian.   Eat your protein first, and then follow with fiber.   It is not necessary to count your fiber, but 15-20 grams per day is recommended.    Add fiber by including fruits, vegetables, whole grains, and beans.   Portions " "should remain about 1 cup per meal. Use measuring cups to be accurate.  Continue to use saucer/salad plates, infant/toddler silverware to keep portion sizes small and take small bites.  Eat S-[id number]-Y to make each meal last 20-30 minutes. Always stop eating when satisfied.  Continue to use caution with foods containing skins, peels or membranes. Chew well!  Aim for 64 oz. of calorie-free fluids daily.  Continue to avoid caffeine and carbonation. If you choose to drink alcohol, do so in moderation.   Remember to avoid drinking during meals, 15-30 minutes before and 30 minutes after.  Exercise is gonzalez for continued weight loss and weight maintenance. 150 minutes weekly of moderate aerobic activity or 75 minutes of vigorous with 2 days or more a week of strength training. Try to get 20% or more of your steps each day at a brisk pace, as though hurrying to a bus stop. Look to get stronger this year.  If having trouble tolerating meat, try using a crock-pot, tinfoil tent, steamer or other moist cooking method to create tender meats. Add broth or low-fat gravy to help meat stay moist.   Avoid high sugar and high fat foods to prevent dumping syndrome.  Check nutrition labels for less than 10 grams of sugar and less than 10 grams of fat per serving.  Continue Taking Vitamins/Minerals:  1000 mcg of Sublingual B-12 at least 2 days weekly to average 250-500mcg/day. If using 2500mcg lozenges, 1 weekly.  If 5000 mcg, once every 10 days dosing works.  1 Complete Multivitamin with 18mg Iron twice daily (chewable or swallow tabs). Often sold as \"women's one a day\" if tablet but take twice daily.  500-600 mg Calcium Citrate  daily (chewable or swallow tabs).  5000 IU Vitamin D3 daily.  If menstruating, you may need closer to 60mg of iron daily to prevent iron deficiency. An occasional \"boost\" of extra iron supplement during/after is reasonable if heavy flow.    Sample Grocery List    Protein:  Fat free Greek or light yogurt (less " than 10 grams sugar)  Fat free or low-fat cottage cheese  String cheese or reduced fat cheese slices  Tuna, salmon, crab, egg, or chicken salad made with light or fat free mayonnaise  Egg or Egg Substitute  Lean/extra lean turkey, beef, bison, venison (ground, sirloin, round, flank)  Pork loin or tenderloin (grilled, baked, broiled)  Fish such as salmon, tuna, trout, tilapia, etc. (grilled, baked, broiled)  Tender cuts of lean (skinless) turkey or chicken  Lean deli meats: turkey, lean ham, chicken, lean roast beef  Beans such as kidney, garbanzo, black, landeros, or low-fat/fat free refried beans  Peanut butter (natural preferred). Limit to 1 Tbsp. per day.  Low-fat meatloaf (made with lean ground beef or turkey)  Sloppy Joes made with low-sugar ketchup and lean ground beef or turkey  Soy or vegetable protein (i.e. vegan crumbles, soy/veggie burger, tofu)  Hummus    Vegetables:  Fresh: cooked or raw (as tolerated)  Frozen vegetables  Canned vegetables (low sodium or no salt added, rinse before cooking/eating)  (Ok to have skins/peels/membranes/seeds - just chew well)    Fruits:  Fresh fruit  Frozen fruit (no sugar added)  Canned fruit (packed in its own juice, NOT syrup)  (Ok to have skins/peels/membranes/seeds - just chew well)    Starch:  Unsweetened whole-grain hot cereal (or high fiber cold cereal, dry)  Toasted whole wheat bread or [place] Thins  Whole grain crackers  Baked /boiled/mashed potato/sweet potato  Cooked whole grain pasta, brown rice, or other cooked whole grains  Starchy vegetables: corn, peas, winter squash    Protein Supplement:   Ready to drink protein shake with:  15-30 grams protein per serving  Less than 10 grams total carbohydrate per serving   Protein powder mixed with:   Skim or 1% milk  Low fat or fat free Lactaid milk, plain or no sugar added soymilk  Water     Fats: (use in moderation)  1 teaspoon of soft tub margarine  1 teaspoon olive oil, canola oil, or peanut oil  1 tablespoon of  low-fat frank or salad dressing     Sample Menu for 18+ months after Gastric Bypass    You do NOT need to eat/drink the full portion sizes listed below  Always stop when you are satisfied    Breakfast   cup 1% cottage cheese     cup mixed berries   Lunch 2 oz lean roast beef on   [place] Thin with 1 tsp. light frank    small tomato, chopped, mixed with 1 tsp. light vinaigrette dressing   Supplement Approved protein supplement (if needed between meals)   Dinner 2 oz grilled salmon    cup salad greens with 1 tsp. light salad dressing and 1 tsp. ground flax seed    cup quinoa or brown rice     Breakfast   cup egg substitute with   cup sautéed chopped vegetables  2 light [place] Krisp crackers   Lunch Tuna Melt:   cup tuna mixed with 1 tsp. light frank over   [place] Thin. Top with 2-3 slices cucumber and 1 oz slice of low fat cheese   Supplement 1 cup skim milk (if needed between meals)   Dinner 3 oz  grilled, broiled, or baked seasoned skinless chicken breast    cup asparagus     Breakfast   cup plain oatmeal made with skim or 1% milk with 1 Tbsp. flavored/unflavored protein powder added  1 mozzarella string cheese   Lunch 2 oz deli turkey breast  1/3 cup salad with 1 tsp. light salad dressing, 1/8 of a whole avocado and 1 Tbsp. sunflower seeds   Dinner 3 oz. pork loin made in a crock pot, seasoned with a spice rub    cup cooked carrots   Supplement Approved protein supplement (if needed between meals)     Breakfast 1 cup breakfast casserole made with egg substitute, turkey sausage,  and steamed, chopped bell peppers   Supplement  1 cup light Greek yogurt (if needed between meals)   Lunch 2 oz. teriyaki turkey    cup mashed sweet potato with 1-2 spritzes of spray butter    cup fresh pineapple   Dinner 3 oz low fat meatloaf    cup roasted garlic zucchini     Breakfast   cup leftover breakfast casserole    cup no sugar added applesauce with 1 Tbsp. unflavored protein powder and a sprinkle of cinnamon    Lunch 3 oz shrimp  with 1-2 Tbsp. low-sugar cocktail sauce for dipping    c. whole wheat pasta drizzled with   tsp. olive oil   Supplement 1 cup skim/1% milk with scoop of protein powder (if needed between meals)   Dinner Grilled, seasoned kebob with 2 oz lean beef and   cup vegetables     Breakfast Breakfast pizza:   [place] Thin spread with 1 Tbsp. low sugar spaghetti sauce,   cup shredded low fat cheese, melted and 1 slice of [place] harmon     cup fresh fruit mixed with chopped almonds   Lunch   cup black bean soup  4-5 whole grain crackers   Dinner 3 oz  tilapia with lemon pepper seasoning    cup stewed tomatoes   Supplement 1 string cheese (if needed between meals)     Breakfast 2 hard boiled eggs (discard 1 egg yolk)    whole wheat English Muffin with 1 tsp. low sugar jelly   Lunch   cup leftover black bean soup topped with 1-2 Tbsp. low fat cheese  2-3 light [place] Krisp crackers   Supplement Approved protein supplement (if needed between meals)   Dinner 3 oz sirloin steak    cup steamed broccoli          LEAN PROTEIN SOURCES  Getting 20-30 grams of protein, 3 meals daily, is appropriate for most people, some need more but more than about 40 grams per meal is not useful.  General rule is drinking one ounce of water per gram of protein eaten over the course of the day:  70 grams of protein each day, drink 70 oz of water.  Protein Source Portion Calories Grams of Protein                           Nonfat, plain Greek yogurt    (10 grams sugar or less) 3/4 cup (6 oz)  12-17   Light Yogurt (10 grams sugar or less) 3/4 cup (6 oz)  6-8   Protein Shake 1 shake 110-180 15-30   Skim/1% Milk or lactose-free milk 1 cup ( 8 oz)  8   Plain or light, flavored soymilk 1 cup  7-8   Plain or light, hemp milk 1 cup 110 6   Fat Free or 1% Cottage Cheese 1/2 cup 90 15   Part skim ricotta cheese 1/2 cup 100 14   Part skim or reduced fat cheese slices 1 ounce 65-80 8     Mozzarella String Cheese 1 80 8   Canned tuna, chicken,  crab or salmon  (canned in water)  1/2 cup 100 15-20   White fish (broiled, grilled, baked) 3 ounces 100 21   [place]/Tuna (broiled, grilled, baked) 3 ounces 150-180 21   Shrimp, Scallops, Lobster, Crab 3 ounces 100 21   Pork loin, Pork Tenderloin 3 ounces 150 21   Boneless, skinless chicken /turkey breast                          (broiled, grilled, baked) 3 ounces 120 21   [place], [place], [place], and Venison 3 ounces 120 21   Lean cuts of red meat and pork (sirloin,   round, tenderloin, flank, ground 93%-96%) 3 ounces 170 21   Lean or Extra Lean Ground Turkey 1/2 cup 150 20   90-95% Lean [place] Burger 1 chong 140-180 21   Low-fat casserole with lean meat 3/4 cup 200 17   [place] Meats                                                        (turkey, lean ham, roast beef, chicken) 3 ounces 100 21   Egg (boiled, poached, scrambled) 1 Egg 60 7   Egg Substitute 1/2 cup 70 10   Nuts (limit to 1 serving per day)  3 Tbsp. 150 7   Nut [place] (peanut, almond)  Limit to 1 serving or less daily 1 Tbsp. 90 4   Soy Burger (varies) 1  15   Garbanzo, Black, [place] Beans 1/2 cup 110 7   Refried Beans 1/2 cup 100 7   Kidney and [place] beans 1/2 cup 110 7   Tempeh 3 oz 175 18   Vegan crumbles 1/2 cup 100 14   Tofu 1/2 cup 110 14   Chili (beans and extra lean beef or turkey) 1 cup 200 23   Lentil Stew/Soup 1 cup 150 12   Black Bean Soup 1 cup 175 12             Example Meal Plan for a 1622-0258 Calorie Diet:    In order to fuel your weight loss properly and avoid hunger-induced overeating later in the day, for your height and weight, you will enjoy the most success by following the diet below or similar with adjustments based on your particular tastes and preferences.  Exercise may influence speed, amount of weight loss further.     I recommend getting into a meal routine and keeping it similar day to day in the beginning so you don t have to think too hard about what you re going to make/eat.  Keep snacks healthy, ideally containing  protein and some vegetables.  Non-processed food is preferable to packaged items.  Eat at least a few crunchy green vegetables if having a snack, which should be 2-3 hours after your mealtimes(prepare these ahead of time for ease of use).  Drink 64 oz -80 oz of water daily for most, some of you will need more and we'll discuss it at your visit if that is the case.      When changing our diet,  we can often mistake thirst for hunger or just have some distracted eating habits that we need to break free from ('bored/mindless eating', screen time,work, driving,etc).  A glass of water and reconsideration of our hunger is often all that is needed.  Having the urge is not the problem, but watching it pass by without acting on it is the goal.    If you re having hunger problems, add a protein drink/snack to your morning hours or afternoon snack with at least 20grams of protein and not too much sugar (under 10g).  A carton of higher protein/low sugar yogurt can work as well.  If the urge to snack is overwhelming and not satiated, try going for a 10 minute walk/exercise, come home and drink a glass of water and if still hungry, have a  calorie snack (handful of raw/sprouted nuts, veggies and string cheese, protein bar, etc).  Savor it.    It is better to have a large breakfast, a moderate lunch and a smaller dinner to fuel your day.  People lose 10-15% more weight during their weight loss season with this strategy. Optimizing your protein intake at each meal will further keep you more satisfied while eating less food overall.  Getting exercise in early has also been shown to offer the best results (before breakfast ideally but anytime is the right time to exercise if that is not an option for you).    To make sure you re getting adequate vitamins and minerals during weight loss, I recommend one complete multivitamin a day of your choice.  Consider a probiotic and taking some vitamin D 2000 IU daily.    Let supper be  your last meal of the day and ideally try to have at least 12 hours between supper and breakfast the next day to tap into some beneficial overnight fasting dynamics.  Midnight snacks need to go away. Water in the evening is fine, unsweetened, non caffeinated herbal tea is helpful as well.  Consolidating your meals within a 8-12 hour period of your day will help tap into these additional metabolic benefits and tends to keep your appetite up for breakfast, further helping to stay on track.  For most of my patients, I don't recommend an intermittent fasting style diet (many find it hard to fit in their lifestyle) but an overnight fast is very doable for most patients and helps regulate our hunger drives a little better.  This makes it very important to nail good intake at all three meals to feel satisfied/energized and still lose weight.      If evening snacking desires are high, consider a glass of fiber supplement for some additional fullness (metamucil or similar). Most of us don't get the 25-30 grams per day of fiber that promotes good gut health/satiety.  Benefiber, metamucil, citrucel are reasonable/affordable options for most people.  Inulin, chicory, psyllium husk are reasonable options but start slow and low in the dose to avoid gas/bloating until your gut gets acclimated (ramping up to 5-10 grams per day of supplemental fiber after 3-4 weeks if needed).      Example Meal Plan:  Breakfast: 450-475 Calories  1 egg cooked on low in olive oil:   calories.  5oz Greek Yogurt ([place] plain classic: ~150 page)  Handful of Berries of your choice (about a calorie per berry or 20-40cal per handful)    cup(cooked) of  old fashioned oatmeal or 1/2 cup(cooked) steel cut oats. (150 page)  Sprinkle amount of brown sugar and a pat of butter. (40 page)  Glass of  Water  Black coffee or unsweetened Tea (0calories).      2-3 hours Later Snack: (195 calories).  Glass of water  One string Cheese (80 calories) or 4 oz creamed  cottage cheese (115 calories) with  Crunchy Celery sticks (less than 10 calories per large stalk) 2 stalks. (20 calories)    of a  Large Banana or   of a Large Apple (60 calories):  eat second half at lunch or afternoon snack.     Lunch:300 -350 calories   Chicken Breast  (baked/broiled/roasted/grilled)  4-6 oz.  (125-180 page), BBQ sauce/hot sauce/mustard/seasoning is free. Just use a reasonable amount. Or a can of tuna with 1 tablespoon mayonnaise.  Salad: lettuce, any other veggies (cucumbers, green peppers/celery you like and a small drizzle of dressing to just flavor.  Go as big on the veggies as you like,  as they are practically calorie free.   A whole, 8 inch cucumber is 45 calories, a whole green pepper is 23 calories, a stalk of celery is 9 calories.  [place] Dressing is 60 calories per tablespoon..so moderate your desired dressing or do a drizzle of olive oil and splash of balsamic vinegar on top,  Total calories unlikely to be over 150 even with dressing.  Glass of Water.    Option for lunch is meal replacement protein drink/smoothie.  Need at least 20 grams of protein and eat the rest of your apple/banana from the morning snack.      Afternoon Snack: 150-200 calories   Cheese Stick or cottage cheese again  and a fresh fruit OR  Granola Bar (protein Bar acceptable if under 200 calories OR  Homemade smoothies:  8oz skim milk,  a handful of berries (fresh or frozen and a serving of protein powder such as BiPro or [place] sWhey for example.  If you don't like dairy, make with 8oz water, one small banana, handful of berries and the protein powder, add any veggies you want as well:  roughly 200 calories.   Glass of Water    Dinner: 325 calories  4oz of fresh, [place] salmon.  Broiled (salt/pepper/dill) for about 8-8.5 minutes (200calories) or  4oz filet mignon steak or sirloin steak  Salad or vegetable sautéed lightly in olive oil or   Broccoli 1.5  cups chopped and steamed  or micro-waved in a little  "water (75 calories)  Glass of Water,    Cup of herbal tea (unsweetened, caffeine free)      Herbs and seasonings are encouraged to flavor your foods/vegetables.  Make your food delicious.      Tips for Success:  1.  Prepare proteins ahead of time (broil chicken breasts in bulk so you can grab and go), steel cut oats/lentils can be stored in casserole dish/bowl in the fridge for quick scoop in the morning and rewarm in microwave, make use of crock pot recipes (watch salt content).  Making meals that cover 3-4 future meals is an easy way to stay on track.  2.  Drink a 8-12 oz glass of water every 2-3 hours when awake.  We often mistake hunger for thirst, especially when losing weight.  3. Remember your Reward and Motivation when things get hard.  4.  Weigh yourself every morning and record, you'll stay on track better and learn how our biorhythms, diet and elimination patterns show up on the scale. Don't worry about 1 or 2 day patterns, but when on track you'll notice good trend downward of weight over 3-4 day segments.  Plateaus tend to resolve after 4-8 days in most cases if you stay consistent with your plan.  These are natural and part of weight loss, even if you're perfect with your plan execution.  5. Call if problems/concerns.  [employer] is a great tool to stay in touch and provide weekly outside accountability. Check in with questions or if you want to brag.  6.  Find a handful of meals/foods that keep you on track and feeling good and get into a routine that is sustainable for you.  It's OK to have a routine that works for you.  7.  Consider taking a complete multivitamin just to make sure all micronutrients are adequate during weight loss.  8. If losing hair/brittle nails it usually means you are not taking enough protein.  Minimum goal is 60 grams daily of protein for smaller women, 80 grams a day for men. Consider taking Biotin as supplement or a \"Hair and Nail\" multivitamin.      Wegovy/Ozempic " (semaglutide) is a very effective satiety boosting appetite suppressant. It needs to be ramped up slowly to be tolerated adequately.  About 1/10 people will not tolerate this medication. Each month, you move up to a higher dose until eventually reaching the 2.4mg/week dose if tolerated. When in plentiful supply, one try at re-ramping is recommended if the initial ramping isn't tolerated well and if that re-ramping isn't tolerated well, it's best to avoid this medication.       Wegovy starts at 0.25mg/week for 4 weeks then increases to 0.5mg/week for 4 weeks then 1mg/week for 4 weeks then 1.7mg/week for 4 weeksthen 2.4mg/week usually for 6-9 months if tolerated well.  Injections can be given after cleansing the skin with alcohol prep pad or swab (available OTC).     Stop Wegovy if severe abdominal pain/vomiting/rash/throat swelling or constant nausea that prevents adequate food/water intake. Stop 2-3 weeks prior to any planned general anesthesia surgeries to reduce risk for something called a post operative ileus.     Gallstones can occur in about 1% of patients on this medication so update me if increase right upper abdominal pain after eating.     Start meals with protein first, separate beverages from meals by 20 minutes and work hard in between meals to get your 64-75 oz of water daily to reduce risks for severe constipation. Consider a fiber supplement like powdered psyllium husk in 12 oz water each night, stool softerners as needed and Miralax or milk of Magnesia if more than 3 days have passed without a Bowel Movement.     Check out [facility] for patient resources.  If you have weekends off, I recommend dosing Friday evenings.     Some people starve on this medication if not mindful about food intake. I recommend starting meals with the protein part of your meal first, chew thoroughly and separate beverages from meals by about 20 minutes to make sure you get your nourishment in first. Include  vegetables/complex carbohydrates and unsaturated fat as part of your balanced diet but group these at the end of the meal, after protein is mostly gone. Satiety will kick in too early if drinking too much with meals and under nourishment can result.     It's not a bad idea to take a complete multivitamin most days of the week if using this medication.     Pancreatitis is a very rare but potentially serious side effect. Stop Wegovy if severe mid abdominal pain/burning in nature or if unable to eat/drink due to severe nausea/discomfort.   People with strong history of pancreatitis without clear cause should stay clear of this medication as should those with strong personal or family history for medullary thyroid cancer or Multiple Endocrine Neoplasia (rare).     Kind Regards,  Shon Chand MD  [facility]

## 2024-02-08 NOTE — LETTER
2/8/2024         RE: Israel Zabala  [address] [address] [facility] [address]        Dear Colleague,    Thank you for referring your patient, Israel Zabala, to the [facility] [place]. Please see a copy of my visit note below.    Bariatric Follow Up Visit with a History of Previous Bariatric Surgery     Date of visit: 2/8/2024  Physician: Shon Chand MD, MD  Primary Care Provider:  Miriam Ghotra  Israel Zabala   43 year old  female    Date of Surgery: 2003 in [facility] with Dr. Dey  Initial Weight: 284 lbs  Initial BMI: n/a  Intake with our program in 2018 at 276 lbs  Borderline diabetes in the past at 6.4% A1c, started ozempic January of 2022.  Today's Weight:   Wt Readings from Last 1 Encounters:   02/08/24 116.1 kg (256 lb)     Weight history:   Wt Readings from Last 4 Encounters:   02/08/24 116.1 kg (256 lb)   01/11/24 116.6 kg (257 lb)   01/19/23 114.8 kg (253 lb)   01/12/23 115.7 kg (255 lb)      Body mass index is 46.82 kg/m .      Assessment and Plan     Assessment: Israel is a 43 year old year old female who is about 21 years s/p  Dung en Y Gastric Bypass with  Dr. Dey at [facility] .  She's struggled with weight regain over the years, down about 20 lbs from re-establishment of care visit in 2018 with use (276 lbs at that point in time).     She's used some Ozempic in 2022 for borderline diabetes/metabolic syndrome risks with her resistant Class III obesity. A1c well controlled now.     Previous Upper GI looked good/no structural breakdowns evident.          1/11/24 labs showed good vitamin support of D and B12 levels, slight elevations shouldn't pose toxicity risks. Lipids are excellent. A1c well controlled again at 5.7%.   Israel Zabala feels as if she hasn't fully achieved the goals she hoped to accomplish through bariatric surgery and weight loss.    Encounter Diagnoses   Name Primary?     Borderline diabetes      Class 3 severe  "obesity due to excess calories with serious comorbidity and body mass index (BMI) of 45.0 to 49.9 in adult (H) Yes     Hx of gastric bypass      Postoperative malabsorption          Current Outpatient Medications:      atorvastatin (LIPITOR) 10 MG tablet, Take 1 tablet (10 mg) by mouth daily, Disp: 90 tablet, Rfl: 3     Calcium Citrate-Vitamin D (CALCIUM CITRATE +D PO), Take 500 mg by mouth daily, Disp: , Rfl:      Cholecalciferol (VITAMIN D) 2000 UNITS tablet, Take 1 tablet by mouth daily, Disp: , Rfl:      ferrous sulfate (IRON) 325 (65 FE) MG tablet, Take 1 tablet (325 mg) by mouth 2 times daily, Disp: 60 tablet, Rfl: 2     Multiple Vitamins-Minerals (MULTIVITAMIN PO), , Disp: , Rfl:      mupirocin (BACTROBAN) 2 % external ointment, Apply topically 3 times daily, Disp: 30 g, Rfl: 0     Semaglutide, 1 MG/DOSE, (OZEMPIC) 4 MG/3ML pen, Inject 1 mg Subcutaneous every 7 days, Disp: 9 mL, Rfl: 3     Semaglutide, 1 MG/DOSE, (OZEMPIC) 4 MG/3ML pen, Inject 1 mg Subcutaneous every 7 days, Disp: 9 mL, Rfl: 1     VITAMIN B-12 PO, 1 TABLET ORALLY DAILY(HOME MED), Disp: , Rfl:     Plan:    Return in about 5 months (around 7/8/2024) for Follow up, with me.    Bariatric Surgery Review     Interim History/LifeChanges: feels overall in good shape. Snacking desire is much improved compared to a year ago. Work schedule: packs lunch everyday. Will often have cottage cheese/lunch meats. If eats too much may be uncomfortable. Will have some protein mix     Feels a bit frustrated w/ her weight plateau.     Battling perimenopausal sx: more fatigued/brain fogged. Irregular cycles.     Today feels a bit \"blah\", feels less strong lately as well.  Walking on treadmill at home.     Patient Concerns: follow up   Appetite (1-10): low  GERD: no.    Reviewed whether any need/indication for screening EGD today and we will deferred.  Typically, a screening EGD is recommend post op year 2-3 if no symptoms to assess health of esophagus/bariatric " "surgery and sooner if difficult to control GERD or persistent pain/dysphagia sx despite behavior modification.    Medication changes: 1mg/week ozempic.    Vitamin Intake:   B-12   Has cut back to 2x weekly.   MVI  Using [employer] Bi, insufficient for her needs as is gummy style and will go back.    Vitamin D  yes   Calcium   yes     Other  no              LABS: \"Reviewed    Nausea mildly  Vomiting no  A bit lower energy, unclear if possibly Ozempic effect or lack of complete multivitamin or a bit of both.    Most recent labs:  Lab Results   Component Value Date    WBC 9.2 01/11/2022    HGB 13.6 01/11/2022    HCT 41.0 01/11/2022    MCV 93 01/11/2022     01/11/2022     Lab Results   Component Value Date    CHOL 163 01/11/2024     Lab Results   Component Value Date    HDL 69 01/11/2024     No components found for: \"LDLCALC\"  Lab Results   Component Value Date    TRIG 78 01/11/2024     No results found for: \"CHOLHDL\"  Lab Results   Component Value Date    ALT 42 01/11/2024    AST 31 01/11/2024    ALKPHOS 116 01/11/2024     No results found for: \"HGBA1C\"  Lab Results   Component Value Date    B12 1,809 (H) 01/11/2024     No components found for: \"VITDT1\"  Lab Results   Component Value Date    KETAN 42 01/12/2023     Lab Results   Component Value Date    PTHI 43 01/11/2024     Lab Results   Component Value Date    ZN 65.6 01/25/2022     Lab Results   Component Value Date    VIB1WB 141 01/11/2024     Lab Results   Component Value Date    TSH 1.60 01/11/2024     No results found for: \"TEST\"      Social History     Social History     Socioeconomic History     Marital status:      Spouse name: Not on file     Number of children: 1     Years of education: Not on file     Highest education level: Not on file   Occupational History     Occupation:    Tobacco Use     Smoking status: Former     Packs/day: 0.10     Years: 8.00     Additional pack years: 0.00     Total pack years: 0.80     Types: " Cigarettes     Quit date: 9/26/2008     Years since quitting: 15.3     Smokeless tobacco: Never   Vaping Use     Vaping Use: Never used   Substance and Sexual Activity     Alcohol use: Yes     Comment: occasional     Drug use: No     Sexual activity: Yes     Partners: Male     Birth control/protection: None     Comment: NFP   Other Topics Concern     Parent/sibling w/ CABG, MI or angioplasty before 65F 55M? No   Social History Narrative     Not on file     Social Determinants of Health     Financial Resource Strain: Low Risk  (1/10/2024)    Financial Resource Strain      Within the past 12 months, have you or your family members you live with been unable to get utilities (heat, electricity) when it was really needed?: No   Food Insecurity: Low Risk  (1/10/2024)    Food Insecurity      Within the past 12 months, did you worry that your food would run out before you got money to buy more?: No      Within the past 12 months, did the food you bought just not last and you didn t have money to get more?: No   Transportation Needs: Low Risk  (1/10/2024)    Transportation Needs      Within the past 12 months, has lack of transportation kept you from medical appointments, getting your medicines, non-medical meetings or appointments, work, or from getting things that you need?: No   Physical Activity: Not on file   Stress: Not on file   Social Connections: Not on file   Interpersonal Safety: Low Risk  (1/11/2024)    Interpersonal Safety      Do you feel physically and emotionally safe where you currently live?: Yes      Within the past 12 months, have you been hit, slapped, kicked or otherwise physically hurt by someone?: No      Within the past 12 months, have you been humiliated or emotionally abused in other ways by your partner or ex-partner?: No   Housing Stability: Low Risk  (1/10/2024)    Housing Stability      Do you have housing? : Yes      Are you worried about losing your housing?: No       Past Medical History      Past Medical History:   Diagnosis Date     Abnormal glandular Papanicolaou smear of cervix 2002    Negative colpo per patient     Cancer (H) 11/7/2013    focally positive for metastatic carcinoid tumor- [facility]     Chickenpox      Chronic maxillary sinusitis 03/31/2006     CTS (carpal tunnel syndrome) 03/06/2014     Situational anxiety 12/13/2012    Related to her cancer diagnosis     Past Surgical History:   Procedure Laterality Date     BRONCHOSCOPY FLEXIBLE  11/01/2013    related to carcinoid;  Flexible Bronchoscopy, Endobronchial Flexible Ultrasound;  Surgeon: Chris Sal MD;  Location: [facility] OR     CHOLECYSTECTOMY       ENDOBRONCHIAL ULTRASOUND FLEXIBLE  11/01/2013    Procedure: ENDOBRONCHIAL ULTRASOUND FLEXIBLE;;  Surgeon: Chris Sal MD;  Location: [facility] OR     GASTRIC BYPASS  2003     HERNIA REPAIR      2011, supraumbilical     HERNIORRHAPHY INCISIONAL (LOCATION)  08/19/2011    Procedure:HERNIORRHAPHY INCISIONAL (LOCATION); Incisional Hernia Repair upper abdomen midline       LUNG LOBECTOMY      for carcinoid, done at [place]     REVISION KATHY-EN-Y       TONSILLECTOMY  age 7     ZZC REMOVAL OF LUNG,BILOBECTOMY Right 11/22/2013    Carcinoid: Right Middle and Lower Lobe       Problem List     Patient Active Problem List   Diagnosis     Obesity, Class III, BMI 40-49.9 (morbid obesity) (H)     CARDIOVASCULAR SCREENING; LDL GOAL LESS THAN 160     Malignant carcinoid tumor (H)     Morbid obesity, unspecified obesity type (H)     Hx of gastric bypass     Excessive bleeding in premenopausal period     White coat syndrome without diagnosis of hypertension     Nipple discharge in female     Postoperative malabsorption     Secondary hyperparathyroidism, non-renal (H24)     Hyperlipidemia LDL goal <70     Prediabetes     Medications     [unfilled]  Surgical History     Past Surgical History  She has a past surgical history that includes gastric bypass (2003); tonsillectomy (age 7);  "Herniorrhaphy incisional (location) (2011); REMOVAL OF LUNG,BILOBECTOMY (Right, 2013); Bronchoscopy flexible (2013); Endobronchial ultrasound flexible (2013); Revision Dung-En-Y; hernia repair; Cholecystectomy; and Lung Lobectomy.    Objective-Exam     Constitutional:  Ht 1.575 m (5' 2\")   Wt 116.1 kg (256 lb)   LMP 2023   BMI 46.82 kg/m    [unfilled]   General:  Pleasant and in no acute distress   Eyes:  EOMI  ENT:  Airway patent,    Neck:  Respiratory: Normal respiratory effort, no cough, .  CV:    Gastrointestinal:   Musculoskeletal: muscle mass WNL  Skin: color without obvious pallor,  hair thick,   Psychiatric: alert and oriented X3, mood and affect normal    Counseling     We reviewed the important post op bariatric recommendations:  -eating 3 meals daily  -eating protein first, getting >60gm protein daily  -eating slowly, chewing food well  -avoiding/limiting calorie containing beverages  -drinking water 15-30 minutes before or after meals  -limiting restaurant or cafeteria eating to twice a week or less    We discussed the importance of restorative sleep and stress management in maintaining a healthy weight.  We discussed the National Weight Control Registry healthy weight maintenance strategies and ways to optimize metabolism.  We discussed the importance of physical activity including cardiovascular and strength training in maintaining a healthier weight.    We discussed the importance of life-long vitamin supplementation and life-long  follow-up.    Reviewed medication adjustment options.    Shon Chand MD    [facility].  2024  12:31 PM  [phone] (clinic phone)  [phone] (fax)    No images are attached to the encounter.  Medical Decision Makin minutes spent by me on the date of the encounter doing chart review, history and exam, documentation and further activities per the note    Virtual Visit Details    Type of service:  " Video Visit     Originating Location (pt. Location): Home    Distant Location (provider location):  On-site  Platform used for Video Visit: Dio      Again, thank you for allowing me to participate in the care of your patient.        Sincerely,        Shon Chand MD

## 2024-02-08 NOTE — NURSING NOTE
Is the patient currently in the state of [place]? YES    Visit mode:VIDEO    If the visit is dropped, the patient can be reconnected by: VIDEO VISIT: Send to e-mail at: [email or web address]    Will anyone else be joining the visit? NO  (If patient encounters technical issues they should call [phone]    How would you like to obtain your AVS? MyChart    Are changes needed to the allergy or medication list? Pt stated no changes to allergies and Pt stated no med changes  Please remove any meds marked not taking and any flagged for removal.    Reason for visit: RECHECK    Wt/ht other than 24 hrs:    Pain more than one location:  no  Laura ADAMSF              Left Voice message to call coordinator back.  ----- Message from Darrin Verma MD sent at 9/28/2023 12:04 PM CDT -----  Lower myfortic 360 bid please, start serum BK every 2 weeks pcr per protocol

## 2024-02-08 NOTE — PROGRESS NOTES
Bariatric Follow Up Visit with a History of Previous Bariatric Surgery     Date of visit: 2/8/2024  Physician: Shon Chand MD, MD  Primary Care Provider:  Miriam Ghotrafiliberto Zabala   43 year old  female    Date of Surgery: 2003 in [facility] with Dr. Dey  Initial Weight: 284 lbs  Initial BMI: n/a  Intake with our program in 2018 at 276 lbs  Borderline diabetes in the past at 6.4% A1c, started ozempic January of 2022.  Today's Weight:   Wt Readings from Last 1 Encounters:   02/08/24 116.1 kg (256 lb)     Weight history:   Wt Readings from Last 4 Encounters:   02/08/24 116.1 kg (256 lb)   01/11/24 116.6 kg (257 lb)   01/19/23 114.8 kg (253 lb)   01/12/23 115.7 kg (255 lb)      Body mass index is 46.82 kg/m .      Assessment and Plan     Assessment: Israel is a 43 year old year old female who is about 21 years s/p  Dung en Y Gastric Bypass with  Dr. Dey at [facility] .  She's struggled with weight regain over the years, down about 20 lbs from re-establishment of care visit in 2018 with use (276 lbs at that point in time).     She's used some Ozempic in 2022 for borderline diabetes/metabolic syndrome risks with her resistant Class III obesity. A1c well controlled now.     Previous Upper GI looked good/no structural breakdowns evident.          1/11/24 labs showed good vitamin support of D and B12 levels, slight elevations shouldn't pose toxicity risks. Lipids are excellent. A1c well controlled again at 5.7%.   Israel Zabala feels as if she hasn't fully achieved the goals she hoped to accomplish through bariatric surgery and weight loss.    Encounter Diagnoses   Name Primary?    Borderline diabetes     Class 3 severe obesity due to excess calories with serious comorbidity and body mass index (BMI) of 45.0 to 49.9 in adult (H) Yes    Hx of gastric bypass     Postoperative malabsorption          Current Outpatient Medications:     atorvastatin (LIPITOR) 10 MG tablet, Take 1 tablet (10 mg) by  "mouth daily, Disp: 90 tablet, Rfl: 3    Calcium Citrate-Vitamin D (CALCIUM CITRATE +D PO), Take 500 mg by mouth daily, Disp: , Rfl:     Cholecalciferol (VITAMIN D) 2000 UNITS tablet, Take 1 tablet by mouth daily, Disp: , Rfl:     ferrous sulfate (IRON) 325 (65 FE) MG tablet, Take 1 tablet (325 mg) by mouth 2 times daily, Disp: 60 tablet, Rfl: 2    Multiple Vitamins-Minerals (MULTIVITAMIN PO), , Disp: , Rfl:     mupirocin (BACTROBAN) 2 % external ointment, Apply topically 3 times daily, Disp: 30 g, Rfl: 0    Semaglutide, 1 MG/DOSE, (OZEMPIC) 4 MG/3ML pen, Inject 1 mg Subcutaneous every 7 days, Disp: 9 mL, Rfl: 3    Semaglutide, 1 MG/DOSE, (OZEMPIC) 4 MG/3ML pen, Inject 1 mg Subcutaneous every 7 days, Disp: 9 mL, Rfl: 1    VITAMIN B-12 PO, 1 TABLET ORALLY DAILY(HOME MED), Disp: , Rfl:     Plan:  Plan:  To optimize bariatric surgery, trying to get that first meal within 2-3 hours of waking, anchor meals in protein first approach, consider a worktime protein rich snack (yogurt and some nuts/seeds or piece of fruit with some cottage cheese) to get home feeling well fueld. Meals every 4-5 hours tends to work best after gastric bypass and continuing to separate beverages from meals.     2. Hydration goal of 75-85oz/day.    3. Vitamins:  you should switch to a \"complete\" women's one a day vitamin and use 2 daily. Reduce D3 to 5/7 days to reduce mild excess vitamin D. B12 can be 1000mcg sublingually twice weekly.  Calcium citrate once nightly, 500mg.    4. Ozempic may be mildly too strong: to see, you can count back 18 clicks from the 1mg marker and this should be close to 0.75mg/dose and do that the next month and then you can decide if that dose works better for your than the full 1mg/week dose. Let me know if still not feeling well and we can back off to the 0.5mg/week pen instead.     5. Check in with dietician this spring.    6. Med check with me in 4-6 months.   Return in about 5 months (around 7/8/2024) for Follow up, " "with me.    Bariatric Surgery Review     Interim History/LifeChanges: feels overall in good shape. Snacking desire is much improved compared to a year ago. Work schedule: packs lunch everyday. Will often have cottage cheese/lunch meats. If eats too much may be uncomfortable. Will have some protein mix     Feels a bit frustrated w/ her weight plateau.     Battling perimenopausal sx: more fatigued/brain fogged. Irregular cycles.     Today feels a bit \"blah\", feels less strong lately as well.  Walking on treadmill at home.     Patient Concerns: follow up   Appetite (1-10): low  GERD: no.    Reviewed whether any need/indication for screening EGD today and we will deferred.  Typically, a screening EGD is recommend post op year 2-3 if no symptoms to assess health of esophagus/bariatric surgery and sooner if difficult to control GERD or persistent pain/dysphagia sx despite behavior modification.    Medication changes: 1mg/week ozempic.    Vitamin Intake:   B-12   Has cut back to 2x weekly.   MVI  Using Target Bi, insufficient for her needs as is gummy style and will go back.    Vitamin D  yes   Calcium   yes     Other  no              LABS: \"Reviewed    Nausea mildly  Vomiting no  A bit lower energy, unclear if possibly Ozempic effect or lack of complete multivitamin or a bit of both.    Most recent labs:  Lab Results   Component Value Date    WBC 9.2 01/11/2022    HGB 13.6 01/11/2022    HCT 41.0 01/11/2022    MCV 93 01/11/2022     01/11/2022     Lab Results   Component Value Date    CHOL 163 01/11/2024     Lab Results   Component Value Date    HDL 69 01/11/2024     No components found for: \"LDLCALC\"  Lab Results   Component Value Date    TRIG 78 01/11/2024     No results found for: \"CHOLHDL\"  Lab Results   Component Value Date    ALT 42 01/11/2024    AST 31 01/11/2024    ALKPHOS 116 01/11/2024     No results found for: \"HGBA1C\"  Lab Results   Component Value Date    B12 1,809 (H) 01/11/2024     No components " "found for: \"VITDT1\"  Lab Results   Component Value Date    KETAN 42 01/12/2023     Lab Results   Component Value Date    PTHI 43 01/11/2024     Lab Results   Component Value Date    ZN 65.6 01/25/2022     Lab Results   Component Value Date    VIB1WB 141 01/11/2024     Lab Results   Component Value Date    TSH 1.60 01/11/2024     No results found for: \"TEST\"      Social History     Social History     Socioeconomic History    Marital status:      Spouse name: Not on file    Number of children: 1    Years of education: Not on file    Highest education level: Not on file   Occupational History    Occupation:    Tobacco Use    Smoking status: Former     Packs/day: 0.10     Years: 8.00     Additional pack years: 0.00     Total pack years: 0.80     Types: Cigarettes     Quit date: 9/26/2008     Years since quitting: 15.3    Smokeless tobacco: Never   Vaping Use    Vaping Use: Never used   Substance and Sexual Activity    Alcohol use: Yes     Comment: occasional    Drug use: No    Sexual activity: Yes     Partners: Male     Birth control/protection: None     Comment: NFP   Other Topics Concern    Parent/sibling w/ CABG, MI or angioplasty before 65F 55M? No   Social History Narrative    Not on file     Social Determinants of Health     Financial Resource Strain: Low Risk  (1/10/2024)    Financial Resource Strain     Within the past 12 months, have you or your family members you live with been unable to get utilities (heat, electricity) when it was really needed?: No   Food Insecurity: Low Risk  (1/10/2024)    Food Insecurity     Within the past 12 months, did you worry that your food would run out before you got money to buy more?: No     Within the past 12 months, did the food you bought just not last and you didn t have money to get more?: No   Transportation Needs: Low Risk  (1/10/2024)    Transportation Needs     Within the past 12 months, has lack of transportation kept you from medical " appointments, getting your medicines, non-medical meetings or appointments, work, or from getting things that you need?: No   Physical Activity: Not on file   Stress: Not on file   Social Connections: Not on file   Interpersonal Safety: Low Risk  (1/11/2024)    Interpersonal Safety     Do you feel physically and emotionally safe where you currently live?: Yes     Within the past 12 months, have you been hit, slapped, kicked or otherwise physically hurt by someone?: No     Within the past 12 months, have you been humiliated or emotionally abused in other ways by your partner or ex-partner?: No   Housing Stability: Low Risk  (1/10/2024)    Housing Stability     Do you have housing? : Yes     Are you worried about losing your housing?: No       Past Medical History     Past Medical History:   Diagnosis Date    Abnormal glandular Papanicolaou smear of cervix 2002    Negative colpo per patient    Cancer (H) 11/7/2013    focally positive for metastatic carcinoid tumor- [facility]    Chickenpox     Chronic maxillary sinusitis 03/31/2006    CTS (carpal tunnel syndrome) 03/06/2014    Situational anxiety 12/13/2012    Related to her cancer diagnosis     Past Surgical History:   Procedure Laterality Date    BRONCHOSCOPY FLEXIBLE  11/01/2013    related to carcinoid;  Flexible Bronchoscopy, Endobronchial Flexible Ultrasound;  Surgeon: Chris Sal MD;  Location: [facility] OR    CHOLECYSTECTOMY      ENDOBRONCHIAL ULTRASOUND FLEXIBLE  11/01/2013    Procedure: ENDOBRONCHIAL ULTRASOUND FLEXIBLE;;  Surgeon: Chris Sal MD;  Location: [facility] OR    GASTRIC BYPASS  2003    HERNIA REPAIR      2011, supraumbilical    HERNIORRHAPHY INCISIONAL (LOCATION)  08/19/2011    Procedure:HERNIORRHAPHY INCISIONAL (LOCATION); Incisional Hernia Repair upper abdomen midline      LUNG LOBECTOMY      for carcinoid, done at [place]    REVISION KATHY-EN-Y      TONSILLECTOMY  age 7    ZZC REMOVAL OF LUNG,BILOBECTOMY Right 11/22/2013     "Carcinoid: Right Middle and Lower Lobe       Problem List     Patient Active Problem List   Diagnosis    Obesity, Class III, BMI 40-49.9 (morbid obesity) (H)    CARDIOVASCULAR SCREENING; LDL GOAL LESS THAN 160    Malignant carcinoid tumor (H)    Morbid obesity, unspecified obesity type (H)    Hx of gastric bypass    Excessive bleeding in premenopausal period    White coat syndrome without diagnosis of hypertension    Nipple discharge in female    Postoperative malabsorption    Secondary hyperparathyroidism, non-renal (H24)    Hyperlipidemia LDL goal <70    Prediabetes     Medications     [unfilled]  Surgical History     Past Surgical History  She has a past surgical history that includes gastric bypass (2003); tonsillectomy (age 7); Herniorrhaphy incisional (location) (08/19/2011); REMOVAL OF LUNG,BILOBECTOMY (Right, 11/22/2013); Bronchoscopy flexible (11/01/2013); Endobronchial ultrasound flexible (11/01/2013); Revision Dung-En-Y; hernia repair; Cholecystectomy; and Lung Lobectomy.    Objective-Exam     Constitutional:  Ht 1.575 m (5' 2\")   Wt 116.1 kg (256 lb)   LMP 11/05/2023   BMI 46.82 kg/m    [unfilled]   General:  Pleasant and in no acute distress   Eyes:  EOMI  ENT:  Airway patent,    Neck:  Respiratory: Normal respiratory effort, no cough, .  CV:    Gastrointestinal:   Musculoskeletal: muscle mass WNL  Skin: color without obvious pallor,  hair thick,   Psychiatric: alert and oriented X3, mood and affect normal    Counseling     We reviewed the important post op bariatric recommendations:  -eating 3 meals daily  -eating protein first, getting >60gm protein daily  -eating slowly, chewing food well  -avoiding/limiting calorie containing beverages  -drinking water 15-30 minutes before or after meals  -limiting restaurant or cafeteria eating to twice a week or less    We discussed the importance of restorative sleep and stress management in maintaining a healthy weight.  We discussed the National " Weight Control Registry healthy weight maintenance strategies and ways to optimize metabolism.  We discussed the importance of physical activity including cardiovascular and strength training in maintaining a healthier weight.    We discussed the importance of life-long vitamin supplementation and life-long  follow-up.    Reviewed medication adjustment options.    Shon Chand MD    [facility].  2024  12:31 PM  [phone] (clinic phone)  [phone] (fax)    No images are attached to the encounter.  Medical Decision Makin minutes spent by me on the date of the encounter doing chart review, history and exam, documentation and further activities per the note

## 2024-02-08 NOTE — PROGRESS NOTES
Virtual Visit Details    Type of service:  Video Visit     Originating Location (pt. Location): Home    Distant Location (provider location):  On-site  Platform used for Video Visit: Dio

## 2024-05-03 ENCOUNTER — TELEPHONE (OUTPATIENT)
Dept: FAMILY MEDICINE | Facility: CLINIC | Age: 44
End: 2024-05-03
Payer: COMMERCIAL

## 2024-05-03 NOTE — TELEPHONE ENCOUNTER
Patient Quality Outreach    Patient is due for the following:   Diabetes -  A1C, Eye Exam, and Microalbumin    Next Steps:   No follow up needed at this time.    Type of outreach:    Chart review performed, no outreach needed.      Questions for provider review:    None           Gunjan Rosales, CMA

## 2024-06-08 ENCOUNTER — HEALTH MAINTENANCE LETTER (OUTPATIENT)
Age: 44
End: 2024-06-08

## 2024-07-10 NOTE — PROGRESS NOTES
Virtual Visit Details    Type of service:  Video Visit   1:26 PM    Originating Location (pt. Location): Home    Distant Location (provider location):  On-site  Platform used for Video Visit: Dio    Bariatric Follow Up Visit with a History of Previous Bariatric Surgery     Date of visit: 7/10/2024  Physician: Shon Chand MD, MD  Primary Care Provider:  Miriam Ghotra Ericgeorge   43 year old  female      Date of Surgery: 2003 in [facility] with Dr. Dey  Initial Weight: 284 lbs  Intake with our program in 2018 at 276 lbs  Borderline diabetes in the past at 6.4% A1c, started ozempic January of 2022.  Today's Weight:   Wt Readings from Last 1 Encounters:   07/11/24 117.5 kg (259 lb)     Weight history:   Wt Readings from Last 4 Encounters:   07/11/24 117.5 kg (259 lb)   02/08/24 116.1 kg (256 lb)   01/11/24 116.6 kg (257 lb)   01/19/23 114.8 kg (253 lb)      Lab Results   Component Value Date    A1C 5.7 01/11/2024    A1C 5.5 01/12/2023    A1C 5.7 04/08/2022    A1C 6.6 01/11/2022    A1C 6.2 12/18/2020    A1C 6.4 12/27/2018    A1C 6.4 12/27/2018    A1C 5.8 11/09/2016    A1C 5.7 10/29/2014       Body mass index is 47.37 kg/m .      Assessment and Plan     Assessment: Israel is a 43 year old year old female who is 21 years s/p  Dung en Y Gastric Bypass with  Dr. Dey at [facility] .  She's been using Ozempic for borderline diabetes history and has been on 1mg/week with tolerance. A1c improved from 6.6% to 5.7% from Jan '22 to Jan 24.  She's had some lower efficacy of late and was interested in transition to Mounjaro which would be reasonable if covered affordably. Given her resistant Class III obesity with type II diabetes hx we'll see how she responds to the change and transition to the 5mg/week dose if covered.    Weight has been down 17 lbs since re-establishing care and 25 lbs down from weight prior to her gastric bypass. Resistant Class III obesity.    Vitamin support overall showed  support 1/11/24.     Israel Zabala feels as if she  achieved the goals she hoped to accomplish through bariatric surgery and weight loss.    Encounter Diagnoses   Name Primary?    Borderline diabetes Yes    Class 3 severe obesity due to excess calories with serious comorbidity and body mass index (BMI) of 45.0 to 49.9 in adult (H)          Current Outpatient Medications:     tirzepatide (MOUNJARO) 5 MG/0.5ML pen, Inject 5 mg subcutaneously every 7 days for 90 days, Disp: 2 mL, Rfl: 2    atorvastatin (LIPITOR) 10 MG tablet, Take 1 tablet (10 mg) by mouth daily, Disp: 90 tablet, Rfl: 3    Calcium Citrate-Vitamin D (CALCIUM CITRATE +D PO), Take 500 mg by mouth daily, Disp: , Rfl:     Cholecalciferol (VITAMIN D) 2000 UNITS tablet, Take 1 tablet by mouth daily, Disp: , Rfl:     ferrous sulfate (IRON) 325 (65 FE) MG tablet, Take 1 tablet (325 mg) by mouth 2 times daily, Disp: 60 tablet, Rfl: 2    Multiple Vitamins-Minerals (MULTIVITAMIN PO), , Disp: , Rfl:     mupirocin (BACTROBAN) 2 % external ointment, Apply topically 3 times daily, Disp: 30 g, Rfl: 0    VITAMIN B-12 PO, 1 TABLET ORALLY DAILY(HOME MED), Disp: , Rfl:     Plan:  If covered, transition to 5mg/week Mounjaro and we'll stay at that dose until our follow up if tolerated well and consider higher dosing if needed. You can start the Mounjaro if covered, one week after your last dose of Wegovy 1mg/week.     2. Check labs in the next week or two: A1c, CMP and TSH. Plan to recheck full labs/nutritional labs end of year when she gets her annual check up with PCP.     3. Recheck tolerance in 3 months. Follow up with dietician.    4. Aim for 2 hours of self care weekly.     5. Given increased fractured sleep, follow up with sleep medicine if persisting. Risks for ISA exist but primarily insomnia concerns.   No follow-ups on file.    Bariatric Surgery Review     Interim History/LifeChanges: tolerating ozempic but at plateau.   Finding some sleep issues and irregular  "periomenopausal sx. Sleep is disrupted and gets up 5 days weekly at 4:15am. In bed by 9pm, asleep no later than 10pm but wakes frequently. Lots of kid activities when not working, lack of self care time. Looking for new position in near future. Open to sleep evaluation.    Brining breakfast to work: homemade egg burrito(eggs/veg/meat). No snacking at work. Lunch brought to work: leftovers from dinner or meat/cheese nuts. D  Dinner is homemade.   Weekends occ order out, casual restaurant fare.     Chores: lawnmowing. No regular exercise currently. Last winter was more active. Weekends are dance competitions/travels.     Patient Concerns: follow up on medication support  Appetite (1-10): controlled  GERD: no.    Reviewed whether any need/indication for screening EGD today and we will deferred.  Typically, a screening EGD is recommend post op year 2-3 if no symptoms to assess health of esophagus/bariatric surgery and sooner if difficult to control GERD or persistent pain/dysphagia sx despite behavior modification.    Medication changes:     Vitamin Intake:   B-12   yes   MVI  yes   Vitamin D  yes   Calcium   yes     Other  N/a              LABS: ordered  Tolerates medication well.       Most recent labs:  Lab Results   Component Value Date    WBC 9.2 01/11/2022    HGB 13.6 01/11/2022    HCT 41.0 01/11/2022    MCV 93 01/11/2022     01/11/2022     Lab Results   Component Value Date    CHOL 163 01/11/2024     Lab Results   Component Value Date    HDL 69 01/11/2024     No components found for: \"LDLCALC\"  Lab Results   Component Value Date    TRIG 78 01/11/2024     No results found for: \"CHOLHDL\"  Lab Results   Component Value Date    ALT 42 01/11/2024    AST 31 01/11/2024    ALKPHOS 116 01/11/2024     No results found for: \"HGBA1C\"  Lab Results   Component Value Date    B12 1,809 (H) 01/11/2024     No components found for: \"VITDT1\"  Lab Results   Component Value Date    KETAN 42 01/12/2023     Lab Results   Component " "Value Date    PTHI 43 01/11/2024     Lab Results   Component Value Date    ZN 65.6 01/25/2022     Lab Results   Component Value Date    VIB1WB 141 01/11/2024     Lab Results   Component Value Date    TSH 1.60 01/11/2024     No results found for: \"TEST\"    Habits:  Low exercise habits as kids demand her to drive to activities.     Social History     Social History     Socioeconomic History    Marital status:      Spouse name: Not on file    Number of children: 1    Years of education: Not on file    Highest education level: Not on file   Occupational History    Occupation:    Tobacco Use    Smoking status: Former     Current packs/day: 0.00     Average packs/day: 0.1 packs/day for 8.0 years (0.8 ttl pk-yrs)     Types: Cigarettes     Start date: 9/26/2000     Quit date: 9/26/2008     Years since quitting: 15.8    Smokeless tobacco: Never   Vaping Use    Vaping status: Never Used   Substance and Sexual Activity    Alcohol use: Yes     Comment: occasional    Drug use: No    Sexual activity: Yes     Partners: Male     Birth control/protection: None     Comment: NFP   Other Topics Concern    Parent/sibling w/ CABG, MI or angioplasty before 65F 55M? No   Social History Narrative    Not on file     Social Determinants of Health     Financial Resource Strain: Low Risk  (1/10/2024)    Financial Resource Strain     Within the past 12 months, have you or your family members you live with been unable to get utilities (heat, electricity) when it was really needed?: No   Food Insecurity: Low Risk  (1/10/2024)    Food Insecurity     Within the past 12 months, did you worry that your food would run out before you got money to buy more?: No     Within the past 12 months, did the food you bought just not last and you didn t have money to get more?: No   Transportation Needs: Low Risk  (1/10/2024)    Transportation Needs     Within the past 12 months, has lack of transportation kept you from medical " appointments, getting your medicines, non-medical meetings or appointments, work, or from getting things that you need?: No   Physical Activity: Not on file   Stress: Not on file   Social Connections: Not on file   Interpersonal Safety: Low Risk  (1/11/2024)    Interpersonal Safety     Do you feel physically and emotionally safe where you currently live?: Yes     Within the past 12 months, have you been hit, slapped, kicked or otherwise physically hurt by someone?: No     Within the past 12 months, have you been humiliated or emotionally abused in other ways by your partner or ex-partner?: No   Housing Stability: Low Risk  (1/10/2024)    Housing Stability     Do you have housing? : Yes     Are you worried about losing your housing?: No       Past Medical History     Past Medical History:   Diagnosis Date    Abnormal glandular Papanicolaou smear of cervix 2002    Negative colpo per patient    Cancer (H) 11/7/2013    focally positive for metastatic carcinoid tumor- [facility]    Chickenpox     Chronic maxillary sinusitis 03/31/2006    CTS (carpal tunnel syndrome) 03/06/2014    Situational anxiety 12/13/2012    Related to her cancer diagnosis     Past Surgical History:   Procedure Laterality Date    BRONCHOSCOPY FLEXIBLE  11/01/2013    related to carcinoid;  Flexible Bronchoscopy, Endobronchial Flexible Ultrasound;  Surgeon: Chris Sal MD;  Location: [facility] OR    CHOLECYSTECTOMY      ENDOBRONCHIAL ULTRASOUND FLEXIBLE  11/01/2013    Procedure: ENDOBRONCHIAL ULTRASOUND FLEXIBLE;;  Surgeon: Chris Sal MD;  Location: [facility] OR    GASTRIC BYPASS  2003    HERNIA REPAIR      2011, supraumbilical    HERNIORRHAPHY INCISIONAL (LOCATION)  08/19/2011    Procedure:HERNIORRHAPHY INCISIONAL (LOCATION); Incisional Hernia Repair upper abdomen midline      LUNG LOBECTOMY      for carcinoid, done at [facility]    REVISION KATHY-EN-Y      TONSILLECTOMY  age 7    ZZC REMOVAL OF LUNG,BILOBECTOMY Right 11/22/2013     Carcinoid: Right Middle and Lower Lobe       Problem List     Patient Active Problem List   Diagnosis    Obesity, Class III, BMI 40-49.9 (morbid obesity) (H)    CARDIOVASCULAR SCREENING; LDL GOAL LESS THAN 160    Malignant carcinoid tumor (H)    Morbid obesity, unspecified obesity type (H)    Hx of gastric bypass    Excessive bleeding in premenopausal period    White coat syndrome without diagnosis of hypertension    Nipple discharge in female    Postoperative malabsorption    Secondary hyperparathyroidism, non-renal (H24)    Hyperlipidemia LDL goal <70    Prediabetes     Medications     [unfilled]  Surgical History     Past Surgical History  She has a past surgical history that includes gastric bypass (2003); tonsillectomy (age 7); Herniorrhaphy incisional (location) (08/19/2011); REMOVAL OF LUNG,BILOBECTOMY (Right, 11/22/2013); Bronchoscopy flexible (11/01/2013); Endobronchial ultrasound flexible (11/01/2013); Revision Dung-En-Y; hernia repair; Cholecystectomy; and Lung Lobectomy.    Objective-Exam     Constitutional:  Wt 117.5 kg (259 lb)   BMI 47.37 kg/m    [unfilled]   General:  Pleasant and in no acute distress   Eyes:  EOMI  ENT:  Airway unable to assess due to video clarity    Neck:  Respiratory: Normal respiratory effort, no cough.  CV:    Gastrointestinal:   Musculoskeletal: muscle mass WNL  Skin: color without pallor, tanned face. hair thick,   Psychiatric: alert and oriented X3, mood and affect normal    Counseling     Reviewed effects of poor sleep on habits. Interested in referral.   Reviewed medication options. Use of Zepbound.  Diet recall looks in line but living very sedentary life.    Shon Chand MD    [facility].  7/10/2024  5:15 PM  [phone] (clinic phone)  [phone] (fax)    No images are attached to the encounter.  Medical Decision Making:

## 2024-07-11 ENCOUNTER — VIRTUAL VISIT (OUTPATIENT)
Dept: SURGERY | Facility: CLINIC | Age: 44
End: 2024-07-11
Payer: COMMERCIAL

## 2024-07-11 ENCOUNTER — TELEPHONE (OUTPATIENT)
Dept: SURGERY | Facility: CLINIC | Age: 44
End: 2024-07-11

## 2024-07-11 VITALS — WEIGHT: 259 LBS | BODY MASS INDEX: 47.37 KG/M2

## 2024-07-11 DIAGNOSIS — G47.00 INSOMNIA, UNSPECIFIED TYPE: ICD-10-CM

## 2024-07-11 DIAGNOSIS — E66.813 CLASS 3 SEVERE OBESITY DUE TO EXCESS CALORIES WITH SERIOUS COMORBIDITY AND BODY MASS INDEX (BMI) OF 45.0 TO 49.9 IN ADULT (H): ICD-10-CM

## 2024-07-11 DIAGNOSIS — E66.01 CLASS 3 SEVERE OBESITY DUE TO EXCESS CALORIES WITH SERIOUS COMORBIDITY AND BODY MASS INDEX (BMI) OF 45.0 TO 49.9 IN ADULT (H): ICD-10-CM

## 2024-07-11 DIAGNOSIS — R73.03 BORDERLINE DIABETES: Primary | ICD-10-CM

## 2024-07-11 DIAGNOSIS — Z98.84 HX OF GASTRIC BYPASS: ICD-10-CM

## 2024-07-11 PROCEDURE — 99214 OFFICE O/P EST MOD 30 MIN: CPT | Mod: 95 | Performed by: EMERGENCY MEDICINE

## 2024-07-11 ASSESSMENT — PAIN SCALES - GENERAL: PAINLEVEL: NO PAIN (0)

## 2024-07-11 NOTE — PATIENT INSTRUCTIONS
Plan:  If covered, transition to 5mg/week Mounjaro and we'll stay at that dose until our follow up if tolerated well and consider higher dosing if needed. You can start the Mounjaro if covered, one week after your last dose of Wegovy 1mg/week. See info below on Zepbound reminders.    2. Check labs in the next week or two: A1c, CMP and TSH. Plan to recheck full labs/nutritional labs end of year when she gets her annual check up with PCP.     3. Recheck tolerance in 3 months. Follow up with dietician.    4. Aim for 2 hours of self care weekly.     5. Given increased fractured sleep, follow up with sleep medicine if persisting. Risks for ISA exist but     6. Metabolic drive is quite sedentary currently. Find some strength/fitness focus 15-30minutes most days of the week to get metabolic rate up.           LEAN PROTEIN SOURCES  Getting 20-30 grams of protein, 3 meals daily, is appropriate for most people, some need more but more than about 40 grams per meal is not useful.  General rule is drinking one ounce of water per gram of protein eaten over the course of the day:  70 grams of protein each day, drink 70 oz of water.  Protein Source Portion Calories Grams of Protein                           Nonfat, plain Greek yogurt    (10 grams sugar or less) 3/4 cup (6 oz)  12-17   Light Yogurt (10 grams sugar or less) 3/4 cup (6 oz)  6-8   Protein Shake 1 shake 110-180 15-30   Skim/1% Milk or lactose-free milk 1 cup ( 8 oz)  8   Plain or light, flavored soymilk 1 cup  7-8   Plain or light, hemp milk 1 cup 110 6   Fat Free or 1% Cottage Cheese 1/2 cup 90 15   Part skim ricotta cheese 1/2 cup 100 14   Part skim or reduced fat cheese slices 1 ounce 65-80 8     Mozzarella String Cheese 1 80 8   Canned tuna, chicken, crab or salmon  (canned in water)  1/2 cup 100 15-20   White fish (broiled, grilled, baked) 3 ounces 100 21   [place]/Tuna (broiled, grilled, baked) 3 ounces 150-180 21   Shrimp, Scallops,  Lobster, Crab 3 ounces 100 21   Pork loin, Pork Tenderloin 3 ounces 150 21   Boneless, skinless chicken /turkey breast                          (broiled, grilled, baked) 3 ounces 120 21   [place], [place], [place], and Venison 3 ounces 120 21   Lean cuts of red meat and pork (sirloin,   round, tenderloin, flank, ground 93%-96%) 3 ounces 170 21   Lean or Extra Lean Ground Turkey 1/2 cup 150 20   90-95% Lean [place] Burger 1 chong 140-180 21   Low-fat casserole with lean meat 3/4 cup 200 17   Luncheon Meats                                                        (turkey, lean ham, roast beef, chicken) 3 ounces 100 21   Egg (boiled, poached, scrambled) 1 Egg 60 7   Egg Substitute 1/2 cup 70 10   Nuts (limit to 1 serving per day)  3 Tbsp. 150 7   Nut [place] (peanut, almond)  Limit to 1 serving or less daily 1 Tbsp. 90 4   Soy Burger (varies) 1  15   Garbanzo, Black, [place] Beans 1/2 cup 110 7   Refried Beans 1/2 cup 100 7   Kidney and Lima beans 1/2 cup 110 7   Tempeh 3 oz 175 18   Vegan crumbles 1/2 cup 100 14   Tofu 1/2 cup 110 14   Chili (beans and extra lean beef or turkey) 1 cup 200 23   Lentil Stew/Soup 1 cup 150 12   Black Bean Soup 1 cup 175 12       [facility]  Nutritional Guidelines  Gastric Bypass 18 Months Post Op and Beyond    General Guidelines and Helpful Hints:  Eat 3 meals per day + protein supplement(s). No snacks between meals.  Do not skip meals.  This can cause overeating at the next meal and will prevent adequate protein and nutritional intake.  Aim for 60-80 grams of protein per day.  Always eat your protein first. This assists with optimal nutrition and helps you stay full longer.  Depending on your portion size, you may need to drink approved protein supplement between meals to achieve protein goals. Follow recommendations of your Dietitian.   Eat your protein first, and then follow with fiber.   It is not necessary to count your fiber, but 15-20 grams per day is recommended.    Add  "fiber by including fruits, vegetables, whole grains, and beans.   Portions should remain about 1 cup per meal. Use measuring cups to be accurate.  Continue to use saucer/salad plates, infant/toddler silverware to keep portion sizes small and take small bites.  Eat S-L-O-W-L-Y to make each meal last 20-30 minutes. Always stop eating when satisfied.  Continue to use caution with foods containing skins, peels or membranes. Chew well!  Aim for 64 oz. of calorie-free fluids daily.  Continue to avoid caffeine and carbonation. If you choose to drink alcohol, do so in moderation.   Remember to avoid drinking during meals, 15-30 minutes before and 30 minutes after.  Exercise is gonzalez for continued weight loss and weight maintenance. 150 minutes weekly of moderate aerobic activity or 75 minutes of vigorous with 2 days or more a week of strength training. Try to get 20% or more of your steps each day at a brisk pace, as though hurrying to a bus stop. Look to get stronger this year.  If having trouble tolerating meat, try using a crock-pot, tinfoil tent, steamer or other moist cooking method to create tender meats. Add broth or low-fat gravy to help meat stay moist.   Avoid high sugar and high fat foods to prevent dumping syndrome.  Check nutrition labels for less than 10 grams of sugar and less than 10 grams of fat per serving.  Continue Taking Vitamins/Minerals:  1000 mcg of Sublingual B-12 at least 3 days weekly to average 350-500mcg/day. If using 2500mcg lozenges, 2 weekly.  If 5000 mcg, once weekly dosing works.  1 Complete Multivitamin with 18mg Iron twice daily (chewable or swallow tabs). Often sold as \"women's one a day\" if tablet but take twice daily.  500-600 mg Calcium Citrate twice daily (chewable or swallow tabs).  5000 IU Vitamin D3 daily.  If menstruating, you may need closer to 60mg of iron daily to prevent iron deficiency. An occasional \"boost\" of extra iron supplement during/after is reasonable if heavy " flow.    Sample Grocery List    Protein:  Fat free Greek or light yogurt (less than 10 grams sugar)  Fat free or low-fat cottage cheese  String cheese or reduced fat cheese slices  Tuna, salmon, crab, egg, or chicken salad made with light or fat free mayonnaise  Egg or Egg Substitute  Lean/extra lean turkey, beef, bison, venison (ground, sirloin, round, flank)  Pork loin or tenderloin (grilled, baked, broiled)  Fish such as salmon, tuna, trout, tilapia, etc. (grilled, baked, broiled)  Tender cuts of lean (skinless) turkey or chicken  Lean deli meats: turkey, lean ham, chicken, lean roast beef  Beans such as kidney, garbanzo, black, landeros, or low-fat/fat free refried beans  Peanut butter (natural preferred). Limit to 1 Tbsp. per day.  Low-fat meatloaf (made with lean ground beef or turkey)  Sloppy Joes made with low-sugar ketchup and lean ground beef or turkey  Soy or vegetable protein (i.e. vegan crumbles, soy/veggie burger, tofu)  Hummus    Vegetables:  Fresh: cooked or raw (as tolerated)  Frozen vegetables  Canned vegetables (low sodium or no salt added, rinse before cooking/eating)  (Ok to have skins/peels/membranes/seeds - just chew well)    Fruits:  Fresh fruit  Frozen fruit (no sugar added)  Canned fruit (packed in its own juice, NOT syrup)  (Ok to have skins/peels/membranes/seeds - just chew well)    Starch:  Unsweetened whole-grain hot cereal (or high fiber cold cereal, dry)  Toasted whole wheat bread or [place] Thins  Whole grain crackers  Baked /boiled/mashed potato/sweet potato  Cooked whole grain pasta, brown rice, or other cooked whole grains  Starchy vegetables: corn, peas, winter squash    Protein Supplement:   Ready to drink protein shake with:  15-30 grams protein per serving  Less than 10 grams total carbohydrate per serving   Protein powder mixed with:   Skim or 1% milk  Low fat or fat free Lactaid milk, plain or no sugar added soymilk  Water     Fats: (use in moderation)  1 teaspoon of soft tub  margarine  1 teaspoon olive oil, canola oil, or peanut oil  1 tablespoon of low-fat frank or salad dressing     Sample Menu for 18+ months after Gastric Bypass    You do NOT need to eat/drink the full portion sizes listed below  Always stop when you are satisfied    Breakfast   cup 1% cottage cheese     cup mixed berries   Lunch 2 oz lean roast beef on   [place] Thin with 1 tsp. light frank    small tomato, chopped, mixed with 1 tsp. light vinaigrette dressing   Supplement Approved protein supplement (if needed between meals)   Dinner 2 oz grilled salmon    cup salad greens with 1 tsp. light salad dressing and 1 tsp. ground flax seed    cup quinoa or brown rice     Breakfast   cup egg substitute with   cup sautéed chopped vegetables  2 light [place] Krisp crackers   Lunch Tuna Melt:   cup tuna mixed with 1 tsp. light frank over   [place] Thin. Top with 2-3 slices cucumber and 1 oz slice of low fat cheese   Supplement 1 cup skim milk (if needed between meals)   Dinner 3 oz  grilled, broiled, or baked seasoned skinless chicken breast    cup asparagus     Breakfast   cup plain oatmeal made with skim or 1% milk with 1 Tbsp. flavored/unflavored protein powder added  1 mozzarella string cheese   Lunch 2 oz deli turkey breast  1/3 cup salad with 1 tsp. light salad dressing, 1/8 of a whole avocado and 1 Tbsp. sunflower seeds   Dinner 3 oz. pork loin made in a crock pot, seasoned with a spice rub    cup cooked carrots   Supplement Approved protein supplement (if needed between meals)     Breakfast 1 cup breakfast casserole made with egg substitute, turkey sausage,  and steamed, chopped bell peppers   Supplement  1 cup light Greek yogurt (if needed between meals)   Lunch 2 oz. teriyaki turkey    cup mashed sweet potato with 1-2 spritzes of spray butter    cup fresh pineapple   Dinner 3 oz low fat meatloaf    cup roasted garlic zucchini     Breakfast   cup leftover breakfast casserole    cup no sugar added applesauce with 1 Tbsp.  unflavored protein powder and a sprinkle of cinnamon    Lunch 3 oz shrimp with 1-2 Tbsp. low-sugar cocktail sauce for dipping    c. whole wheat pasta drizzled with   tsp. olive oil   Supplement 1 cup skim/1% milk with scoop of protein powder (if needed between meals)   Dinner Grilled, seasoned kebob with 2 oz lean beef and   cup vegetables     Breakfast Breakfast pizza:   [place] Thin spread with 1 Tbsp. low sugar spaghetti sauce,   cup shredded low fat cheese, melted and 1 slice of [place] harmon     cup fresh fruit mixed with chopped almonds   Lunch   cup black bean soup  4-5 whole grain crackers   Dinner 3 oz  tilapia with lemon pepper seasoning    cup stewed tomatoes   Supplement 1 string cheese (if needed between meals)     Breakfast 2 hard boiled eggs (discard 1 egg yolk)    whole wheat English Muffin with 1 tsp. low sugar jelly   Lunch   cup leftover black bean soup topped with 1-2 Tbsp. low fat cheese  2-3 light [place] Krisp crackers   Supplement Approved protein supplement (if needed between meals)   Dinner 3 oz sirloin steak    cup steamed broccoli        On-the-Go Breakfast Ideas  As of 2015, the latest research shows what a huge impact eating breakfast has on losing weight and feeling your best. People lose more weight when they make breakfast their biggest meal of the day compared to Dinner, but even if you cannot go to that degree, getting a breakfast that has at least 20 grams of protein and even a moderate amount of fat is ideal for maintaining good energy through the day and limits overeating in the evening hours.  The following are some quick and easy suggestions for at least getting something of substance into your body in the morning.  Enjoy!    Eating breakfast within 90 minutes of waking up is an important part of taking care of your body on a restricted calorie diet plan.  After sleeping for hours, your body is in need of fuel.  An ideal breakfast is a combination of protein, whole-grain  carbohydrates, or fruit.  Here s why:    -Protein digests very slowly in the body, helping you feel more satisfied.  -Whole grains provide dietary fiber, which also digests slowly and helps keep your gut clean.  -Fruit is a great source of vitamins, minerals, and fiber.     Each one of these breakfast combinations has between 200-300 calories and 15-20 grams of protein.  Feel free to mix and match!    Bone Broth (chicken bone broth or beef bone broth) is a great way to boost protein content. 8oz of bone broth will typically have 9-12grams of protein for 40kcal of energy.    Protein: Choose  -1/2 cup low-fat cottage cheese  -2 hard boiled eggs , or one cooked in olive oil (low/slow heat).  -1 low fat string cheese stick  -1 TablesJenn [place] natural peanut butter  -InsideTrack vegetarian sausage chong (found in freezer section)  -1 slice lowfat cheese  -6 oz 2% or lowfat Greek yogurt, such as Fage or Oikos.    PLUS    Whole Grains:  Choose   -1 whole wheat English muffin  -1 whole wheat elizabet, half  -1/2 Fiber One frozen muffin, thawed  -1/2 Fiber One toaster pastry  -1 whole wheat bagel thin  -1/2 cup Kashi cereal  -1 Kashi waffle (or other whole grain high-fiber waffle)  Aim for whole grain/sprouted breads with at least 3g of fiber/slice if having bread. [place] is one such brand.    OR    Fruit: Choose  -1/3 cup blueberries  -1/2 banana (or a plantain- similar to a banana, yet smaller)  -1/2 cup cantaloupe cubes  -1 small apple  -1 small orange  -1/2 cup strawberries  -handful raspberries/blackberries (each berry is about 1 calorie).    *Adapted from Diabetes Living, Fall 20    Ten Breakfasts Under 250 calories    Ideally, getting between 350-600 calories  (depending on starting height and weight)for breakfast is ideal for avoiding hunger later in the day, adjust/add to the following accordingly:    One- 250 calories, 8.5 g protein  1 slice whole-grain toast   1 Tbsp peanut butter    banana    Two- 250  calories, 8 g protein    cup nonfat/lowfat yogurt  1/3rd cup diced no-sugar peaches  1/3rd cup cereal (like Special K, Cheerios, or bran flakes)    Three- 250 calories, 25 g protein  1 egg scrambled with 1 oz skim milk    cup shredded cheddar    whole grain English muffin  1 oz [place] harmon  1 tsp margarine spread    Four- 225 calories, 25 g protein  1/2 cup Kashi Go-Lean cereal    cup skim milk mixed with 1 scoop Bariatric Advantage protein powder    cup no-sugar diced pears    Five- 250 calories, 20 g protein    cup oatmeal prepared with skim milk, 1 scoop protein powder, and sugar-free maple syrup    Six- 200 calories, 5 g protein  1 whole grain waffle, toasted  1 tablespoon creamy peanut or almond butter    Seven-  250 calories, 19 g protein  Breakfast sandwich: 1 slice whole grain toast, cut in half.  Add 1 scrambled egg and one slice cheddar  cheese.    Eight-  250 calories, 15 g protein  2 eggs scrambled with 1/3 cup frozen spinach (heat before adding to eggs) and 2 tablespoons low fat cream cheese.    Nine-  150 calories, 15 g protein  2/3rd cup cottage cheese    cup cantaloupe    Ten- 200 calories, 20 g protein  Fruit smoothie made with 4 oz. nonfat Greek yogurt,   cup berries, 1 scoop protein powder, and 4 oz skim milk.    Ten Lunches Under 250 Calories    Aim for lunch to be around 300-400 calories a day when trying to lose weight and get that protein in!    One- 200 calories, 11 g protein  1/3 cup tuna salad made with light frank on 1 slice whole grain bread  1 small peeled apple    Two- 250 calories, 16 g protein  1/3 cup lowfat cottage cheese    cup cooked green beans    small fruit cocktail (in natural juice)    Three- 200 calories, 11 g protein    grilled cheese sandwich on whole grain bread with lowfat cheese  2/3rd cup of tomato soup    Four- 250 calories, 22 g protein  Deli wrap: 1 oz sliced turkey, 1 oz sliced ham, 1 oz sliced chicken rolled up with 1 slice low-fat cheese  1 small  orange    Five- 250 calories, 28 g protein  2/3rd cup chili with 1 oz shredded cheese  4 saltine crackers    Six- 250 calories, 22 g protein  1 cup fresh spinach with 2 oz chicken, 1/3rd cup mandarin oranges, and 2 tablespoons sliced almonds with 1 tablespoon  vinaigrette dressing    Seven- 200 calories, 11 g protein  1 Tbsp sugar-free preserves and 1 Tbsp peanut butter on 1 slice whole grain toast    cup nonfat/lowfat Greek yogurt    Eight- 250 calories, 18 g protein  1 small soft-shell chicken taco with 1 oz shredded cheese, lettuce, tomato, salsa, and 1 Tbsp light sour cream    cup black beans    Nine- 225 calories, 13 g protein  2 ounces baked chicken  1/4 cup mashed potatoes    cup green beans    Ten- 200 calories, 21 g protein  Deli elizabet: 2 oz roast beef or other deli meat with 1 tsp Richy mayonnaise and sliced tomato, onion, and lettuce  1/3rd cup cottage cheese      Ten Dinners Under 300 calories    If you're eating a large breakfast and medium lunch, keep dinner small.  300-400 calories is ideal for most people depending on their caloric needs.    One- 300 calories, 12 g protein  1-inch thick slice of turkey meatloaf    cup baked butternut squash    Two- 200 calories, 9 g protein  Bread-less BLT: 3 slices turkey harmon, sliced tomato, wrapped in a large lettuce leaf    cup peeled fruit    Three- 275 calories, 36 g protein  3 oz roasted chicken    cup cooked broccoli    cup shredded cheddar cheese    cup unsweetened applesauce    Four- 200 calories, 25 g protein  3 oz baked tilapia  1/3rd cup cooked carrots    cup yogurt    Five- 250 calories, 20 g protein  Grilled ham  n  Swiss: spread 2 tsp ghee or butter on 1 slice of whole grain bread.  Cut bread in half, layer 2 oz deli ham with 1 piece of Swiss cheese and grill until cheese is melted.    cup cooked vegetables    Six- 250 calories, 18 g protein  Vegetarian cheeseburger: 1 [place] cheeseburger topped with lettuce, onion, tomato, and ketchup/mustard    cup  sweet potato fries    Seven- 250 calories, 18 g protein  Pork pot roast: 2 oz roasted pork loin, 1/3rd cup roasted carrots,   medium potato, cooked with   cup gravy    Eight- 330 calories, 25 g protein  2 oz meatballs (about 2 small meatballs)    cup spaghetti sauce  1/2 piece toast topped with 1 tsp ghee or butterand topped with garlic powder, toasted in oven    Nine- 250 calories, 16 g protein  Mexican pizza: one 8  corn tortilla topped with 2 oz chicken,   cup salsa, 2 tablespoons black beans, 2 tablespoons shredded cheese.  Bake until cheese is melted.    Ten- 250 calories, 22 g protein  Shrimp stir-taveras: 3 oz cooked shrimp, 1/6th onion,   pepper,   cup chopped carrots sautéed in 1 tablespoon olive oil, topped with 2 tablespoons stir taveras sauce and a pinch of sesame seeds        150 Calories or Less Snack Ideas   1 hardboiled egg with   cup berries  1 small apple with 1 hardboiled egg  10 almonds with   cup berries  2 clementines with 1 light string cheese  1 light string cheese with   sliced apple  1 light string cheese wrapped in 2 slices of turkey  4 100% whole wheat crackers (e.g. Triscuit) with 1 light string cheese    c. cottage cheese with   cup fruit and 1 Tbsp sunflower seeds     cup cottage cheese with   of an avocado     can tuna fish with 1 cup sliced cucumbers     cup roasted garbanzo beans with paprika and cayenne pepper    baked sweet potato with   cup chili beans or   cup cottage cheese  2 oz. nitrate free turkey slices with 1 cup carrots  1 container (6 oz) of low sugar (less than 10 grams of sugar) greek yogurt   3 Tablespoons of hummus with 1 cup sliced bell peppers   2 Tablespoons of hummus with 15 baby carrots  4 Tablespoons ranch dip made with plain Greek Yogurt and 3 mini cucumbers  1/4 cup nuts (any kind)  1 Tablespoon peanut butter with 1 stalk celery   1 dill pickle wrapped in 1-2 slices of deli ham with 1 tsp of mayonnaise/mustard.        Zepbound (Tirzepatide) is a very effective  satiety boosting appetite suppressant that elevates satiety hormones GLP1 and GIP. It needs to be ramped up slowly to be tolerated adequately.  About 1/10 people will not tolerate this medication. Each month, you move up to a higher dose until eventually reaching the 10mg/week dose if tolerated with further ramping to follow if needed. If intolerant or severe side effects, a dose decrease would be wise, so keep me posted if not tolerated the ramping well. This may be a longer term medication based on individual needs/physiology and appetite control.     Injections can be given after cleansing the skin with alcohol prep pad or swab (available OTC).     Stop Zepbound if severe abdominal pain/vomiting/rash/throat swelling or constant nausea that prevents adequate food/water intake. Stop 2-3 weeks prior to any planned general anesthesia surgeries to reduce risk for something called a post operative ileus.     Gallstones can occur in about 1% of patients on this medication so update me if increase right upper abdominal pain after eating.     Start meals with protein first, separate beverages from meals by 20 minutes and work hard in between meals to get your 64-75 oz of water daily to reduce risks for severe constipation. Consider a fiber supplement like powdered psyllium husk in 12 oz water each night, stool softerners as needed and Miralax or milk of Magnesia if more than 3 days have passed without a Bowel Movement.     Check out [employer] for patient resources.  If you have weekends off, I recommend dosing Friday evenings.     Some people starve on this medication if not mindful about food intake. I recommend starting meals with the protein part of your meal first, chew thoroughly and separate beverages from meals by about 20 minutes to make sure you get your nourishment in first. Include vegetables/complex carbohydrates and unsaturated fat as part of your balanced diet but group these at the end of the meal, after  your protein is mostly gone. Satiety will kick in too early if drinking too much with meals and under-nourishment can result.     It's not a bad idea to take a complete multivitamin most days of the week if using this medication. Adequate hydration is essential for feeling your best, efficient fat burning, waste elimination and constipation prevention. For those without fluid restrictions due to other disease, the goal is at least one ounce of water per gram of protein consumed with a  minimum of 64oz/day goal. Stool softners and fiber supplements as well as occasional laxative use (miralax, etc) may be necessary if getting too constipated.    Pancreatitis is a very rare but potentially serious side effect. Stop Zepbound if severe mid abdominal pain/burning in nature or if unable to eat/drink due to severe nausea/discomfort.   People with strong history of pancreatitis without clear cause should stay clear of this medication as should those planning to get pregnant, those with strong personal or family history for medullary thyroid cancer or Multiple Endocrine Neoplasia (rare).     Stop Zepbound at least 2 weeks prior to any planned surgery.    Kind Regards,  Shon Chand MD  [facility]

## 2024-07-11 NOTE — LETTER
7/11/2024      Israel Zabala  [id number]  [facility] [address]      Dear Colleague,    Thank you for referring your patient, Israel Zabala, to the [facility] [place]. Please see a copy of my visit note below.    Virtual Visit Details    Type of service:  Video Visit   1:26 PM    Originating Location (pt. Location): Home    Distant Location (provider location):  On-site  Platform used for Video Visit: [facility]    Bariatric Follow Up Visit with a History of Previous Bariatric Surgery     Date of visit: 7/10/2024  Physician: Shon Chand MD, MD  Primary Care Provider:  Miriam Ghotra  Israel ARELY Zabala   43 year old  female      Date of Surgery: 2003 in [facility] with Dr. Dey  Initial Weight: 284 lbs  Intake with our program in 2018 at 276 lbs  Borderline diabetes in the past at 6.4% A1c, started ozempic January of 2022.  Today's Weight:   Wt Readings from Last 1 Encounters:   07/11/24 117.5 kg (259 lb)     Weight history:   Wt Readings from Last 4 Encounters:   07/11/24 117.5 kg (259 lb)   02/08/24 116.1 kg (256 lb)   01/11/24 116.6 kg (257 lb)   01/19/23 114.8 kg (253 lb)      Lab Results   Component Value Date    A1C 5.7 01/11/2024    A1C 5.5 01/12/2023    A1C 5.7 04/08/2022    A1C 6.6 01/11/2022    A1C 6.2 12/18/2020    A1C 6.4 12/27/2018    A1C 6.4 12/27/2018    A1C 5.8 11/09/2016    A1C 5.7 10/29/2014       Body mass index is 47.37 kg/m .      Assessment and Plan     Assessment: Israel is a 43 year old year old female who is 21 years s/p  Dung en Y Gastric Bypass with  Dr. Dey at [facility] .  She's been using Ozempic for borderline diabetes history and has been on 1mg/week with tolerance. A1c improved from 6.6% to 5.7% from Jan '22 to Jan 24.  She's had some lower efficacy of late and was interested in transition to Mounjaro which would be reasonable if covered affordably. Given her resistant Class III obesity with type II diabetes hx we'll see  how she responds to the change and transition to the 5mg/week dose if covered.    Weight has been down 17 lbs since re-establishing care and 25 lbs down from weight prior to her gastric bypass. Resistant Class III obesity.    Vitamin support overall showed support 1/11/24.     Israel Zabala feels as if she  achieved the goals she hoped to accomplish through bariatric surgery and weight loss.    Encounter Diagnoses   Name Primary?     Borderline diabetes Yes     Class 3 severe obesity due to excess calories with serious comorbidity and body mass index (BMI) of 45.0 to 49.9 in adult (H)          Current Outpatient Medications:      tirzepatide (MOUNJARO) 5 MG/0.5ML pen, Inject 5 mg subcutaneously every 7 days for 90 days, Disp: 2 mL, Rfl: 2     atorvastatin (LIPITOR) 10 MG tablet, Take 1 tablet (10 mg) by mouth daily, Disp: 90 tablet, Rfl: 3     Calcium Citrate-Vitamin D (CALCIUM CITRATE +D PO), Take 500 mg by mouth daily, Disp: , Rfl:      Cholecalciferol (VITAMIN D) 2000 UNITS tablet, Take 1 tablet by mouth daily, Disp: , Rfl:      ferrous sulfate (IRON) 325 (65 FE) MG tablet, Take 1 tablet (325 mg) by mouth 2 times daily, Disp: 60 tablet, Rfl: 2     Multiple Vitamins-Minerals (MULTIVITAMIN PO), , Disp: , Rfl:      mupirocin (BACTROBAN) 2 % external ointment, Apply topically 3 times daily, Disp: 30 g, Rfl: 0     VITAMIN B-12 PO, 1 TABLET ORALLY DAILY(HOME MED), Disp: , Rfl:     Plan:  If covered, transition to 5mg/week Mounjaro and we'll stay at that dose until our follow up if tolerated well and consider higher dosing if needed. You can start the Mounjaro if covered, one week after your last dose of Wegovy 1mg/week.     2. Check labs in the next week or two: A1c, CMP and TSH. Plan to recheck full labs/nutritional labs end of year when she gets her annual check up with PCP.     3. Recheck tolerance in 3 months. Follow up with dietician.    4. Aim for 2 hours of self care weekly.     5. Given increased fractured  "sleep, follow up with sleep medicine if persisting. Risks for ISA exist but primarily insomnia concerns.   No follow-ups on file.    Bariatric Surgery Review     Interim History/LifeChanges: tolerating ozempic but at plateau.   Finding some sleep issues and irregular periomenopausal sx. Sleep is disrupted and gets up 5 days weekly at 4:15am. In bed by 9pm, asleep no later than 10pm but wakes frequently. Lots of kid activities when not working, lack of self care time. Looking for new position in near future. Open to sleep evaluation.    Brining breakfast to work: homemade egg burrito(eggs/veg/meat). No snacking at work. Lunch brought to work: leftovers from dinner or meat/cheese nuts. D  Dinner is homemade.   Weekends occ order out, casual restaurant fare.     Chores: lawnmowing. No regular exercise currently. Last winter was more active. Weekends are dance competitions/travels.     Patient Concerns: follow up on medication support  Appetite (1-10): controlled  GERD: no.    Reviewed whether any need/indication for screening EGD today and we will deferred.  Typically, a screening EGD is recommend post op year 2-3 if no symptoms to assess health of esophagus/bariatric surgery and sooner if difficult to control GERD or persistent pain/dysphagia sx despite behavior modification.    Medication changes:     Vitamin Intake:   B-12   yes   MVI  yes   Vitamin D  yes   Calcium   yes     Other  N/a              LABS: ordered  Tolerates medication well.       Most recent labs:  Lab Results   Component Value Date    WBC 9.2 01/11/2022    HGB 13.6 01/11/2022    HCT 41.0 01/11/2022    MCV 93 01/11/2022     01/11/2022     Lab Results   Component Value Date    CHOL 163 01/11/2024     Lab Results   Component Value Date    HDL 69 01/11/2024     No components found for: \"LDLCALC\"  Lab Results   Component Value Date    TRIG 78 01/11/2024     No results found for: \"CHOLHDL\"  Lab Results   Component Value Date    ALT 42 01/11/2024 " "   AST 31 01/11/2024    ALKPHOS 116 01/11/2024     No results found for: \"HGBA1C\"  Lab Results   Component Value Date    B12 1,809 (H) 01/11/2024     No components found for: \"VITDT1\"  Lab Results   Component Value Date    KETAN 42 01/12/2023     Lab Results   Component Value Date    PTHI 43 01/11/2024     Lab Results   Component Value Date    ZN 65.6 01/25/2022     Lab Results   Component Value Date    VIB1WB 141 01/11/2024     Lab Results   Component Value Date    TSH 1.60 01/11/2024     No results found for: \"TEST\"    Habits:  Low exercise habits as kids demand her to drive to activities.     Social History     Social History     Socioeconomic History     Marital status:      Spouse name: Not on file     Number of children: 1     Years of education: Not on file     Highest education level: Not on file   Occupational History     Occupation:    Tobacco Use     Smoking status: Former     Current packs/day: 0.00     Average packs/day: 0.1 packs/day for 8.0 years (0.8 ttl pk-yrs)     Types: Cigarettes     Start date: 9/26/2000     Quit date: 9/26/2008     Years since quitting: 15.8     Smokeless tobacco: Never   Vaping Use     Vaping status: Never Used   Substance and Sexual Activity     Alcohol use: Yes     Comment: occasional     Drug use: No     Sexual activity: Yes     Partners: Male     Birth control/protection: None     Comment: NFP   Other Topics Concern     Parent/sibling w/ CABG, MI or angioplasty before 65F 55M? No   Social History Narrative     Not on file     Social Determinants of Health     Financial Resource Strain: Low Risk  (1/10/2024)    Financial Resource Strain      Within the past 12 months, have you or your family members you live with been unable to get utilities (heat, electricity) when it was really needed?: No   Food Insecurity: Low Risk  (1/10/2024)    Food Insecurity      Within the past 12 months, did you worry that your food would run out before you got money to buy " more?: No      Within the past 12 months, did the food you bought just not last and you didn t have money to get more?: No   Transportation Needs: Low Risk  (1/10/2024)    Transportation Needs      Within the past 12 months, has lack of transportation kept you from medical appointments, getting your medicines, non-medical meetings or appointments, work, or from getting things that you need?: No   Physical Activity: Not on file   Stress: Not on file   Social Connections: Not on file   Interpersonal Safety: Low Risk  (1/11/2024)    Interpersonal Safety      Do you feel physically and emotionally safe where you currently live?: Yes      Within the past 12 months, have you been hit, slapped, kicked or otherwise physically hurt by someone?: No      Within the past 12 months, have you been humiliated or emotionally abused in other ways by your partner or ex-partner?: No   Housing Stability: Low Risk  (1/10/2024)    Housing Stability      Do you have housing? : Yes      Are you worried about losing your housing?: No       Past Medical History     Past Medical History:   Diagnosis Date     Abnormal glandular Papanicolaou smear of cervix 2002    Negative colpo per patient     Cancer (H) 11/7/2013    focally positive for metastatic carcinoid tumor- [facility]     Chickenpox      Chronic maxillary sinusitis 03/31/2006     CTS (carpal tunnel syndrome) 03/06/2014     Situational anxiety 12/13/2012    Related to her cancer diagnosis     Past Surgical History:   Procedure Laterality Date     BRONCHOSCOPY FLEXIBLE  11/01/2013    related to carcinoid;  Flexible Bronchoscopy, Endobronchial Flexible Ultrasound;  Surgeon: Chris Sal MD;  Location: [facility] OR     CHOLECYSTECTOMY       ENDOBRONCHIAL ULTRASOUND FLEXIBLE  11/01/2013    Procedure: ENDOBRONCHIAL ULTRASOUND FLEXIBLE;;  Surgeon: Chris Sal MD;  Location: [facility] OR     GASTRIC BYPASS  2003     HERNIA REPAIR      2011, supraumbilical     HERNIORRHAPHY  INCISIONAL (LOCATION)  08/19/2011    Procedure:HERNIORRHAPHY INCISIONAL (LOCATION); Incisional Hernia Repair upper abdomen midline       LUNG LOBECTOMY      for carcinoid, done at [facility]     REVISION DUNG-EN-Y       TONSILLECTOMY  age 7     ZZC REMOVAL OF LUNG,BILOBECTOMY Right 11/22/2013    Carcinoid: Right Middle and Lower Lobe       Problem List     Patient Active Problem List   Diagnosis     Obesity, Class III, BMI 40-49.9 (morbid obesity) (H)     CARDIOVASCULAR SCREENING; LDL GOAL LESS THAN 160     Malignant carcinoid tumor (H)     Morbid obesity, unspecified obesity type (H)     Hx of gastric bypass     Excessive bleeding in premenopausal period     White coat syndrome without diagnosis of hypertension     Nipple discharge in female     Postoperative malabsorption     Secondary hyperparathyroidism, non-renal (H24)     Hyperlipidemia LDL goal <70     Prediabetes     Medications     [unfilled]  Surgical History     Past Surgical History  She has a past surgical history that includes gastric bypass (2003); tonsillectomy (age 7); Herniorrhaphy incisional (location) (08/19/2011); REMOVAL OF LUNG,BILOBECTOMY (Right, 11/22/2013); Bronchoscopy flexible (11/01/2013); Endobronchial ultrasound flexible (11/01/2013); Revision Dung-En-Y; hernia repair; Cholecystectomy; and Lung Lobectomy.    Objective-Exam     Constitutional:  Wt 117.5 kg (259 lb)   BMI 47.37 kg/m    [unfilled]   General:  Pleasant and in no acute distress   Eyes:  EOMI  ENT:  Airway unable to assess due to video clarity    Neck:  Respiratory: Normal respiratory effort, no cough.  CV:    Gastrointestinal:   Musculoskeletal: muscle mass WNL  Skin: color without pallor, tanned face. hair thick,   Psychiatric: alert and oriented X3, mood and affect normal    Counseling     Reviewed effects of poor sleep on habits. Interested in referral.   Reviewed medication options. Use of Zepbound.  Diet recall looks in line but living very sedentary life.    Shon  MD Mannie    [facility].  7/10/2024  5:15 PM  [phone] (clinic phone)  [phone] (fax)    No images are attached to the encounter.  Medical Decision Making:              Again, thank you for allowing me to participate in the care of your patient.        Sincerely,        Shon Chand MD

## 2024-07-11 NOTE — NURSING NOTE
Is the patient currently in the state of [place]? YES    Visit mode:VIDEO    If the visit is dropped, the patient can be reconnected by: VIDEO VISIT: Text to cell phone:   Telephone Information:   Mobile [phone]    and VIDEO VISIT: Send to e-mail at: [email or web address]    Will anyone else be joining the visit? NO  (If patient encounters technical issues they should call [phone]    How would you like to obtain your AVS? MyChart    Are changes needed to the allergy or medication list? No    Are refills needed on medications prescribed by this physician? NO    Reason for visit: ODILON VEGA

## 2024-07-12 ENCOUNTER — LAB (OUTPATIENT)
Dept: LAB | Facility: CLINIC | Age: 44
End: 2024-07-12
Payer: COMMERCIAL

## 2024-07-12 DIAGNOSIS — R73.03 BORDERLINE DIABETES: ICD-10-CM

## 2024-07-12 DIAGNOSIS — E66.813 CLASS 3 SEVERE OBESITY DUE TO EXCESS CALORIES WITH SERIOUS COMORBIDITY AND BODY MASS INDEX (BMI) OF 45.0 TO 49.9 IN ADULT (H): ICD-10-CM

## 2024-07-12 DIAGNOSIS — E66.01 CLASS 3 SEVERE OBESITY DUE TO EXCESS CALORIES WITH SERIOUS COMORBIDITY AND BODY MASS INDEX (BMI) OF 45.0 TO 49.9 IN ADULT (H): ICD-10-CM

## 2024-07-12 LAB
ALBUMIN SERPL BCG-MCNC: 4.3 G/DL (ref 3.5–5.2)
ALP SERPL-CCNC: 109 U/L (ref 40–150)
ALT SERPL W P-5'-P-CCNC: 33 U/L (ref 0–50)
ANION GAP SERPL CALCULATED.3IONS-SCNC: 13 MMOL/L (ref 7–15)
AST SERPL W P-5'-P-CCNC: 32 U/L (ref 0–45)
BILIRUB SERPL-MCNC: 0.5 MG/DL
BUN SERPL-MCNC: 15.9 MG/DL (ref 6–20)
CALCIUM SERPL-MCNC: 9.8 MG/DL (ref 8.6–10)
CHLORIDE SERPL-SCNC: 103 MMOL/L (ref 98–107)
CREAT SERPL-MCNC: 0.75 MG/DL (ref 0.51–0.95)
DEPRECATED HCO3 PLAS-SCNC: 26 MMOL/L (ref 22–29)
EGFRCR SERPLBLD CKD-EPI 2021: >90 ML/MIN/1.73M2
GLUCOSE SERPL-MCNC: 93 MG/DL (ref 70–99)
HBA1C MFR BLD: 5.5 % (ref 0–5.6)
POTASSIUM SERPL-SCNC: 4.3 MMOL/L (ref 3.4–5.3)
PROT SERPL-MCNC: 7.1 G/DL (ref 6.4–8.3)
SODIUM SERPL-SCNC: 142 MMOL/L (ref 135–145)
TSH SERPL DL<=0.005 MIU/L-ACNC: 1.98 UIU/ML (ref 0.3–4.2)

## 2024-07-12 PROCEDURE — 80053 COMPREHEN METABOLIC PANEL: CPT

## 2024-07-12 PROCEDURE — 84443 ASSAY THYROID STIM HORMONE: CPT

## 2024-07-12 PROCEDURE — 36415 COLL VENOUS BLD VENIPUNCTURE: CPT

## 2024-07-12 PROCEDURE — 83036 HEMOGLOBIN GLYCOSYLATED A1C: CPT

## 2024-07-12 NOTE — TELEPHONE ENCOUNTER
PRIOR AUTHORIZATION DENIED    Medication: TIRZEPATIDE 5 MG/0.5ML SC SOPN  Insurance Company: [employer] ([facility]) - Phone [phone] Fax [phone]  Denial Date: 7/11/2024  Denial Reason(s):     Appeal Information:     Patient Notified: No

## 2024-07-25 NOTE — PROGRESS NOTES
Bariatric Follow Up Visit with a History of Previous Bariatric Surgery     Date of visit: 2/8/2024  Physician: Shon Chand MD, MD  Primary Care Provider:  Miriam Ghotrafiliberto Zabala   43 year old  female    Date of Surgery: 2003 in [facility] with Dr. Dey  Initial Weight: 284 lbs  Initial BMI: n/a  Intake with our program in 2018 at 276 lbs  Borderline diabetes in the past at 6.4% A1c, started ozempic January of 2022.  Today's Weight:   Wt Readings from Last 1 Encounters:   07/11/24 117.5 kg (259 lb)     Weight history:   Wt Readings from Last 4 Encounters:   07/11/24 117.5 kg (259 lb)   02/08/24 116.1 kg (256 lb)   01/11/24 116.6 kg (257 lb)   01/19/23 114.8 kg (253 lb)      Body mass index is 46.82 kg/m .      Assessment and Plan     Assessment: Israel is a 43 year old year old female who is about 21 years s/p  Dung en Y Gastric Bypass with  Dr. Dey at [facility] .  She's struggled with weight regain over the years, down about 20 lbs from re-establishment of care visit in 2018 with use (276 lbs at that point in time).     She's used some Ozempic in 2022 for borderline diabetes/metabolic syndrome risks with her resistant Class III obesity. A1c well controlled now.     Previous Upper GI looked good/no structural breakdowns evident.          1/11/24 labs showed good vitamin support of D and B12 levels, slight elevations shouldn't pose toxicity risks. Lipids are excellent. A1c well controlled again at 5.7%.   Israel Zabala feels as if she hasn't fully achieved the goals she hoped to accomplish through bariatric surgery and weight loss.    Encounter Diagnoses   Name Primary?    Borderline diabetes     Class 3 severe obesity due to excess calories with serious comorbidity and body mass index (BMI) of 45.0 to 49.9 in adult (H) Yes    Hx of gastric bypass     Postoperative malabsorption          Current Outpatient Medications:     atorvastatin (LIPITOR) 10 MG tablet, Take 1 tablet (10 mg) by  "mouth daily, Disp: 90 tablet, Rfl: 3    Calcium Citrate-Vitamin D (CALCIUM CITRATE +D PO), Take 500 mg by mouth daily, Disp: , Rfl:     Cholecalciferol (VITAMIN D) 2000 UNITS tablet, Take 1 tablet by mouth daily, Disp: , Rfl:     ferrous sulfate (IRON) 325 (65 FE) MG tablet, Take 1 tablet (325 mg) by mouth 2 times daily, Disp: 60 tablet, Rfl: 2    Multiple Vitamins-Minerals (MULTIVITAMIN PO), , Disp: , Rfl:     mupirocin (BACTROBAN) 2 % external ointment, Apply topically 3 times daily, Disp: 30 g, Rfl: 0    VITAMIN B-12 PO, 1 TABLET ORALLY DAILY(HOME MED), Disp: , Rfl:     tirzepatide (MOUNJARO) 5 MG/0.5ML pen, Inject 5 mg subcutaneously every 7 days for 90 days, Disp: 2 mL, Rfl: 2    Plan:  Plan:  To optimize bariatric surgery, trying to get that first meal within 2-3 hours of waking, anchor meals in protein first approach, consider a worktime protein rich snack (yogurt and some nuts/seeds or piece of fruit with some cottage cheese) to get home feeling well fueld. Meals every 4-5 hours tends to work best after gastric bypass and continuing to separate beverages from meals.     2. Hydration goal of 75-85oz/day.    3. Vitamins:  you should switch to a \"complete\" women's one a day vitamin and use 2 daily. Reduce D3 to 5/7 days to reduce mild excess vitamin D. B12 can be 1000mcg sublingually twice weekly.  Calcium citrate once nightly, 500mg.    4. Ozempic may be mildly too strong: to see, you can count back 18 clicks from the 1mg marker and this should be close to 0.75mg/dose and do that the next month and then you can decide if that dose works better for your than the full 1mg/week dose. Let me know if still not feeling well and we can back off to the 0.5mg/week pen instead.     5. Check in with dietician this spring.    6. Med check with me in 4-6 months.   Return in about 5 months (around 7/8/2024) for Follow up, with me.    Bariatric Surgery Review     Interim History/LifeChanges: feels overall in good shape. " "Snacking desire is much improved compared to a year ago. Work schedule: packs lunch everyday. Will often have cottage cheese/lunch meats. If eats too much may be uncomfortable. Will have some protein mix     Feels a bit frustrated w/ her weight plateau.     Battling perimenopausal sx: more fatigued/brain fogged. Irregular cycles.     Today feels a bit \"blah\", feels less strong lately as well.  Walking on treadmill at home.     Patient Concerns: follow up   Appetite (1-10): low  GERD: no.    Reviewed whether any need/indication for screening EGD today and we will deferred.  Typically, a screening EGD is recommend post op year 2-3 if no symptoms to assess health of esophagus/bariatric surgery and sooner if difficult to control GERD or persistent pain/dysphagia sx despite behavior modification.    Medication changes: 1mg/week ozempic.    Vitamin Intake:   B-12   Has cut back to 2x weekly.   MVI  Using [employer] Bi, insufficient for her needs as is gummy style and will go back.    Vitamin D  yes   Calcium   yes     Other  no              LABS: \"Reviewed    Nausea mildly  Vomiting no  A bit lower energy, unclear if possibly Ozempic effect or lack of complete multivitamin or a bit of both.    Most recent labs:  Lab Results   Component Value Date    WBC 9.2 01/11/2022    HGB 13.6 01/11/2022    HCT 41.0 01/11/2022    MCV 93 01/11/2022     01/11/2022     Lab Results   Component Value Date    CHOL 163 01/11/2024     Lab Results   Component Value Date    HDL 69 01/11/2024     No components found for: \"LDLCALC\"  Lab Results   Component Value Date    TRIG 78 01/11/2024     No results found for: \"CHOLHDL\"  Lab Results   Component Value Date    ALT 33 07/12/2024    AST 32 07/12/2024    ALKPHOS 109 07/12/2024     No results found for: \"HGBA1C\"  Lab Results   Component Value Date    B12 1,809 (H) 01/11/2024     No components found for: \"VITDT1\"  Lab Results   Component Value Date    KETAN 42 01/12/2023     Lab Results " "  Component Value Date    PTHI 43 01/11/2024     Lab Results   Component Value Date    ZN 65.6 01/25/2022     Lab Results   Component Value Date    VIB1WB 141 01/11/2024     Lab Results   Component Value Date    TSH 1.98 07/12/2024     No results found for: \"TEST\"      Social History     Social History     Socioeconomic History    Marital status:      Spouse name: Not on file    Number of children: 1    Years of education: Not on file    Highest education level: Not on file   Occupational History    Occupation:    Tobacco Use    Smoking status: Former     Current packs/day: 0.00     Average packs/day: 0.1 packs/day for 8.0 years (0.8 ttl pk-yrs)     Types: Cigarettes     Start date: 9/26/2000     Quit date: 9/26/2008     Years since quitting: 15.8    Smokeless tobacco: Never   Vaping Use    Vaping status: Never Used   Substance and Sexual Activity    Alcohol use: Yes     Comment: occasional    Drug use: No    Sexual activity: Yes     Partners: Male     Birth control/protection: None     Comment: NFP   Other Topics Concern    Parent/sibling w/ CABG, MI or angioplasty before 65F 55M? No   Social History Narrative    Not on file     Social Determinants of Health     Financial Resource Strain: Low Risk  (1/10/2024)    Financial Resource Strain     Within the past 12 months, have you or your family members you live with been unable to get utilities (heat, electricity) when it was really needed?: No   Food Insecurity: Low Risk  (1/10/2024)    Food Insecurity     Within the past 12 months, did you worry that your food would run out before you got money to buy more?: No     Within the past 12 months, did the food you bought just not last and you didn t have money to get more?: No   Transportation Needs: Low Risk  (1/10/2024)    Transportation Needs     Within the past 12 months, has lack of transportation kept you from medical appointments, getting your medicines, non-medical meetings or " appointments, work, or from getting things that you need?: No   Physical Activity: Not on file   Stress: Not on file   Social Connections: Not on file   Interpersonal Safety: Low Risk  (1/11/2024)    Interpersonal Safety     Do you feel physically and emotionally safe where you currently live?: Yes     Within the past 12 months, have you been hit, slapped, kicked or otherwise physically hurt by someone?: No     Within the past 12 months, have you been humiliated or emotionally abused in other ways by your partner or ex-partner?: No   Housing Stability: Low Risk  (1/10/2024)    Housing Stability     Do you have housing? : Yes     Are you worried about losing your housing?: No       Past Medical History     Past Medical History:   Diagnosis Date    Abnormal glandular Papanicolaou smear of cervix 2002    Negative colpo per patient    Cancer (H) 11/7/2013    focally positive for metastatic carcinoid tumor- [facility]    Chickenpox     Chronic maxillary sinusitis 03/31/2006    CTS (carpal tunnel syndrome) 03/06/2014    Situational anxiety 12/13/2012    Related to her cancer diagnosis     Past Surgical History:   Procedure Laterality Date    BRONCHOSCOPY FLEXIBLE  11/01/2013    related to carcinoid;  Flexible Bronchoscopy, Endobronchial Flexible Ultrasound;  Surgeon: Chris Sal MD;  Location: [facility] OR    CHOLECYSTECTOMY      ENDOBRONCHIAL ULTRASOUND FLEXIBLE  11/01/2013    Procedure: ENDOBRONCHIAL ULTRASOUND FLEXIBLE;;  Surgeon: Chris Sal MD;  Location: [facility] OR    GASTRIC BYPASS  2003    HERNIA REPAIR      2011, supraumbilical    HERNIORRHAPHY INCISIONAL (LOCATION)  08/19/2011    Procedure:HERNIORRHAPHY INCISIONAL (LOCATION); Incisional Hernia Repair upper abdomen midline      LUNG LOBECTOMY      for carcinoid, done at [place]    REVISION KATHY-EN-Y      TONSILLECTOMY  age 7    ZZC REMOVAL OF LUNG,BILOBECTOMY Right 11/22/2013    Carcinoid: Right Middle and Lower Lobe       Problem List     Patient  "Active Problem List   Diagnosis    Obesity, Class III, BMI 40-49.9 (morbid obesity) (H)    CARDIOVASCULAR SCREENING; LDL GOAL LESS THAN 160    Malignant carcinoid tumor (H)    Morbid obesity, unspecified obesity type (H)    Hx of gastric bypass    Excessive bleeding in premenopausal period    White coat syndrome without diagnosis of hypertension    Nipple discharge in female    Postoperative malabsorption    Secondary hyperparathyroidism, non-renal (H24)    Hyperlipidemia LDL goal <70    Prediabetes     Medications     [unfilled]  Surgical History     Past Surgical History  She has a past surgical history that includes gastric bypass (2003); tonsillectomy (age 7); Herniorrhaphy incisional (location) (08/19/2011); REMOVAL OF LUNG,BILOBECTOMY (Right, 11/22/2013); Bronchoscopy flexible (11/01/2013); Endobronchial ultrasound flexible (11/01/2013); Revision Dung-En-Y; hernia repair; Cholecystectomy; and Lung Lobectomy.    Objective-Exam     Constitutional:  Ht 1.575 m (5' 2\")   Wt 116.1 kg (256 lb)   LMP 11/05/2023   BMI 46.82 kg/m    [unfilled]   General:  Pleasant and in no acute distress   Eyes:  EOMI  ENT:  Airway patent,    Neck:  Respiratory: Normal respiratory effort, no cough, .  CV:    Gastrointestinal:   Musculoskeletal: muscle mass WNL  Skin: color without obvious pallor,  hair thick,   Psychiatric: alert and oriented X3, mood and affect normal    Counseling     We reviewed the important post op bariatric recommendations:  -eating 3 meals daily  -eating protein first, getting >60gm protein daily  -eating slowly, chewing food well  -avoiding/limiting calorie containing beverages  -drinking water 15-30 minutes before or after meals  -limiting restaurant or cafeteria eating to twice a week or less    We discussed the importance of restorative sleep and stress management in maintaining a healthy weight.  We discussed the National Weight Control Registry healthy weight maintenance strategies and ways to " optimize metabolism.  We discussed the importance of physical activity including cardiovascular and strength training in maintaining a healthier weight.    We discussed the importance of life-long vitamin supplementation and life-long  follow-up.    Reviewed medication adjustment options.    Shon Chand MD    [facility].  2024  12:31 PM  [phone] (clinic phone)  [phone] (fax)    No images are attached to the encounter.  Medical Decision Makin minutes spent by me on the date of the encounter doing chart review, history and exam, documentation and further activities per the note

## 2024-07-25 NOTE — LETTER
2024    INSURER: Payor: [employer] / Plan: BCBS OUT OF STATE / Product Type: Indemnity /   ATTN: insurer  Re: Prior Authorization Request  Patient: Israel Zabala  Policy ID#:  [id number]  : 1980      To Whom it May Concern:    I am writing to formally request a prior authorization of coverage for my patient,  Israel Zabala, for treatment using Mounjaro Therapy for continued treatment of her type II diabetes after falling efficacy of Ozempic and some mild to moderate GI intolerance that can affect daily function intermittently and unexpectedly.  Ms. Zabala has been followed for her metabolic health in our clinic since establishing care on 18 to follow up on a 2003 RNY gastric bypass. With substantial weight regain over the 15 years prior to establishing care with us, she'd evolved into type II diabetes from her metabolic syndrome and has been working with us since with success on improving her diabetes and weight related health.  She's been using Ozempic for borderline diabetes history and has been on 1mg/week, A1c improved from 6.6% to 5.7% from  to .  She's had some lower efficacy of late and was interested in transition to Mounjaro which would be reasonable if covered affordably. Given her resistant Class III obesity with type II diabetes hx we'll see how she responds to the change and transition to the 5mg/week dose if covered with plans to ramp to therapeutic benefit/tolerance. As you know the additive benefits of weight management on her type II diabetes/liver health/renal health and brain health/cancer reduction risks cannot be overlooked and Mounjaro offers improved control of these conditions as we work with her bariatric diet in our clinic, with our dieticians. She's been a compliant patient with her vitamin support and I suspect this can round out her toolkit for further treatment in her resistant Class III obesity (she maintains a 24 lb reduction from her 2003 surgery).  "    Lab Results   Component Value Date    A1C 5.5 07/12/2024    A1C 5.7 01/11/2024    A1C 5.5 01/12/2023    A1C 5.7 04/08/2022    A1C 6.6 01/11/2022    A1C 6.2 12/18/2020    A1C 6.4 12/27/2018    A1C 5.8 11/09/2016    A1C 5.7 10/29/2014          Weight has been down 17 lbs since re-establishing care and 25 lbs down from weight prior to her gastric bypass. Resistant Class III obesity.     Vitamin support overall showed support 1/11/24. A1c improved but some concerns about higher dose Ozempic and her ability to tolerate GI side effects so transition to Mounjaro is requested and the initial denial, appealed today.     I've attached my last note, first note below.    I firmly believe that this therapy is clinically appropriate and that Israel Zabala would benefit from improved [clinical outcomes, quality of life] if allowed the opportunity to receive this treatment.  Please contact me at Dept: [phone] if you require additional information to ensure the prompt approval for coverage.    Please send your written decision to me at this address:  [facility] [address] [address] [phone]  Dept fax: [phone]      Sincerely,      Shon Chand MD        Enclosures  Plan:  To optimize bariatric surgery, trying to get that first meal within 2-3 hours of waking, anchor meals in protein first approach, consider a worktime protein rich snack (yogurt and some nuts/seeds or piece of fruit with some cottage cheese) to get home feeling well fueld. Meals every 4-5 hours tends to work best after gastric bypass and continuing to separate beverages from meals.     2. Hydration goal of 75-85oz/day.    3. Vitamins:  you should switch to a \"complete\" women's one a day vitamin and use 2 daily. Reduce D3 to 5/7 days to reduce mild excess vitamin D. B12 can be 1000mcg sublingually twice weekly.  Calcium citrate once nightly, 500mg.    4. " Ozempic may be mildly too strong: to see, you can count back 18 clicks from the 1mg marker and this should be close to 0.75mg/dose and do that the next month and then you can decide if that dose works better for your than the full 1mg/week dose. Let me know if still not feeling well and we can back off to the 0.5mg/week pen instead.     5. Check in with dietician this spring.    6. Med check with me in 4-6 months.        Office Visit - [facility]  12/27/2018  [facility] [place]       Shon Chand MD Postoperative malabsorption +3 more  Dx Weight Problem ; Referred by Devante Painting MD  Reason for Visit     Progress Notes  Shon Chand MD (Physician)  Bariatric Care Clinic Follow Up Visit for Previous Bariatric Surgery   Date of visit: 12/27/2018  Physician: Shon Chand MD  Primary Care is Devante Painting MD.  Israel Zabala   38 y.o.  female     Date of Surgery: 2003  Initial Weight: 284 lbs  Initial BMI: na  Today's Weight:       Wt Readings from Last 1 Encounters:   12/27/18 (!) 276 lb 3.2 oz (125.3 kg)      Body mass index is 49.32 kg/m .  Initial Weight: 276.2 lbs  Weight: (!) 276 lb 3.2 oz (125.3 kg)  Weight loss from initial: 0  % Weight loss: 0 %         Assessment and Plan   Assessment: Israel is a 38 y.o. year old female who is 15 years s/p  Dung en Y Gastric Bypass at [facility] with Dr. Dey.  She has had a durable weight loss of 8  lbs since surgery.  Overall compliance with the [facility] Bariatric Surgery Program has been newly established today as her original surgery/follow up was outside of our program.  She did well for many years but around the time that her special needs child was born in 2012 and then again following her carcinoid resection from her lung in 2013 has had steady weight gain from her steady weight around 220 lbs, to near her preoperative levels.       She has a lot of grazing/mindless eating but still  does endorse some restriction. We'll check an upper GI for evaluation of her previous surgery.      She smoked intermittently for many years following her gastric bypass but did successfully quit around the time of her pulmonary bilobectomy for her carcinoid. We discussed the importance of avoiding nicotine going forward given it's ulcerative effects on her pouch.     Her vitamin supplementation is less than ideal, we'll check her labs and improve her deficiencies.      Her exercise regimen is non existent currently but she has access to a home elliptical and is planning to adapt a couch to 5k plan to the elliptical..  .  Israel Zabala feels that she has not achieved her   preoperative goals for bariatric surgery.     Plan:  1. Welcome to the [facility] clinic. Read through your packet.  2. Vitamin labs can be done today. I'll message with results and correct deficiencies as needed. I'd recommend switching to calcium citrate, increasing your multivitamin to TWICE daily and making sure it has at least 18mg of iron per serving (or consider a prenatal vitamin).   3. Get back on the elliptical and consider starting the couch to 5k program.  4. Referral to dietician.  5. Recheck with me towards the end of February to review how things are going.  6. Upper GI xray can be done to look at structure of your previous surgery.  7 weight gain: would be a candidate for medication to support diet change/weight loss but she wishes to first lay a dietary foundation before considering medication which seems reasonable. We'll meet up again in February to assess how things are going and adjust plan/add medication as needed.           1. Postoperative malabsorption  HM2(CBC w/o Differential)      Comprehensive Metabolic Panel      Vitamin D, Total (25-Hydroxy)      Parathyroid Hormone Intact      Thyroid Stimulating Hormone (TSH)      Vitamin A (Retinol), Serum or Plasma      Vitamin B12      Vitamin B1 (Thiamine),  Whole Blood (VIT B1 WB)      Ferritin      Copper, Serum or Plasma      Zinc, Serum or Plasma      Folate, Serum      Glycosylated Hemoglobin A1c      XR Upper GI W Digital Recording    2. Hx of gastric bypass  Amb Referral to Bariatric Dietician    3. Weight gain following gastric bypass surgery  Amb Referral to Bariatric Dietician      XR Upper GI W Digital Recording    4. Obesity, Class III, BMI 40-49.9 (morbid obesity) (H)  Amb Referral to Bariatric Dietician          No Follow-up on file.      Bariatric Surgery Review   Interim History/LifeChanges: 2012 had daughter with some special needs and increased care requirements, 2013 had bilobectomy of right lung for carcinoid and after having been stable around 220 lbs for many years after her gastric bypass has been gaining back most of her weight initially lost.  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     Patient Concerns: weight regain and getting back on track.     Medication changes: none.     Appetite (1-10): has mindless eating/grazing. Finds herself distracted frequently. Has lost some enjoyment in eating.  GERD sx at all? no     Vitamin Intake:   Multivitamin   once daily [employer] brand   Vitamin D  just the amount in her calcium.   Calcium  yes once daily, carbonate/D   Vit. B-12    thinks 1000mcg SL.      Habits:             Alcohol Intake  occasionally, may have red wine.   NSAID Use  occasionally.   Caffeine Use  yes: 1-2 cups daily (cream).    Exercise  nothing currently. Has an elliptical at home.   CPAP Use:  no   Birth Control  none.                                                        LABS: ordered        LABS:  No results found for: WBC, HGB, HCT, MCV, PLT    No results found for: SEXFPIMH13QU No results found for: HGBA1C   No results found for: CHOL No results found for: PTH       No results found for: FERRITIN   No results found for: HDL    No results found for: MWQOVVCF41 No results found for: 45901   No results  "found for: LDLCALC No results found for: TSH No results found for: FOLATE   No results found for: TRIG No results found for: ALT, AST, GGT, ALKPHOS, BILITOT No results found for: TESTOSTERONE      No components found for: CHOLHDL No results found for: 7597    @resusfast(vitamin a: 1)@            Patient Profile   Social History          Social History Narrative    Not on file         Past Medical History        Past Medical History:   Diagnosis Date    Cancer (H)       right lung carcinoid 2013 s/p bilobectomy at [facility].    History of prediabetes       resolved after 2003 gastric bypass.      There is no problem list on file for this patient.          Current Outpatient Medications   Medication Sig    calcium carb/vit D3/minerals (CALCIUM-VITAMIN D ORAL) Take 1 tablet by mouth.    cyanocobalamin, vitamin B-12, (VITAMIN B-12 ORAL) Take by mouth.    ferrous sulfate 325 (65 FE) MG tablet Take 325 mg by mouth.    multivitamin with minerals tablet Take by mouth.         Past Surgical History  She has a past surgical history that includes Dung-en-y procedure; Hernia repair; and Cholecystectomy.      Examination   /78 (Patient Site: Right Arm, Patient Position: Sitting, Cuff Size: Adult Large)   Pulse 76   Ht 5' 2.75\" (1.594 m)   Wt (!) 276 lb 3.2 oz (125.3 kg)   LMP  (Within Weeks) Comment: About a month ago  SpO2 99%   Breastfeeding? No   BMI 49.32 kg/m    Height: 5' 2.75\" (1.594 m) (12/27/2018  9:22 AM)  Initial Weight: 276.2 lbs (12/27/2018  9:22 AM)  Weight: (!) 276 lb 3.2 oz (125.3 kg) (12/27/2018  9:22 AM)  Weight loss from initial: 0 (12/27/2018  9:22 AM)  % Weight loss: 0 % (12/27/2018  9:22 AM)  BMI (Calculated): 49.3 (12/27/2018  9:22 AM)  SpO2: 99 % (12/27/2018  9:22 AM)  Waist Circumference (In): 58.5 Inches (12/27/2018  9:22 AM)  Hip Circumference (In): 61 Inches (12/27/2018  9:22 AM)  Neck Circumference (In): 15 Inches (12/27/2018  9:22 AM)     General:  Alert and ambulatory,   HEENT:  No " "conjunctival pallor, moist mucous Membranes, neck is thick. No bruit  Pulmonary:  Normal respiratory effort, no cough, no audible wheezes/crackles. Bilateral breath sounds. Larger AP diameter/barrel chest physique.  CV:  Regular rate and Rhythm, no murmurs, pulses 2 plus  Abdominal: midline incision is well healed. Non tender.   Extremities: no edema or tremor  Skin:  No pallor or jaundice   Pscyh/Mood: pleasant, smiling and motivated for change.            Counseling:   We reviewed the important post op bariatric recommendations:  -eating 3 meals daily  -eating protein first, getting >60gm protein daily  -eating slowly, chewing food well  -avoiding/limiting calorie containing beverages  -drinking water 15-30 minutes before or after meals  -limiting restaurant or cafeteria eating to twice a week or less     We discussed the importance of restorative sleep and stress management in maintaining a healthy weight.  We discussed the National Weight Control Registry healthy weight maintenance strategies and ways to optimize metabolism.  We discussed the importance of physical activity including cardiovascular and strength training in maintaining a healthier weight.  We discussed the importance of life-long vitamin supplementation and life-long  follow-up.     Israel was reminded that, to avoid marginal ulcers she should avoid tobacco at all, alcohol in excess, caffeine in excess, and NSAIDS (unless indicated for cardioprotection or othewise and opposed by a PPI).     At least 60 minutes was spent in direct consultation and over 50% of the time devoted to counseling regarding maximizing the benefits of her previous bariatric surgery while minimizing risks of nutritional or structural complications.     Shon Chand MD  [facility].  12/27/2018  9:56 AM                           Additional Documentation    Vitals: Ht 1.594 m (5' 2.75\")     Wt 125.3 kg (276 lb 3.2 oz)     LMP 10/22/2018     BMI 49.32 " kg/m      BSA 2.36 m           More Vitals   Flowsheets: [facility] Historical Data,     NICU VS,     Anthropometrics   Encounter Info: Billing Info,     History,     Allergies,     Detailed Report     Media  From this encounter  Assessment/Questionnaire - Scan on 1/2/2019: STOP BANG   Assessment/Questionnaire - Scan on 1/2/2019: HEALTH HISTORY     Encounter Information    Encounter Information   Provider Department Encounter # Center   12/27/2018 7:57 PM Shon Chand MD [facility] [id number] [facility] [place]     Reviewed this Encounter    None     Communicable/Travel screen  More data exists   11/2/2022 12/1/2022 1/11/2023 1/12/2023 1/17/2023                   Communicable/Travel Screening   Do you have any of the following symptoms? None of these None of these None of these None of these None of these     Details    Open Review Flowsheets      Orders Placed    None  Medication Changes      None  Medication List  Visit Diagnoses      Postoperative malabsorption    Hx of gastric bypass    Weight gain following gastric bypass surgery    Obesity, Class III, BMI 40-49.9 (morbid obesity) (H)  Problem List

## 2024-07-27 NOTE — TELEPHONE ENCOUNTER
Medication Appeal Initiation    Medication: TIRZEPATIDE 5 MG/0.5ML SC SOPN  Appeal Start Date:  7/27/2024  Insurance Company: [employer] ([facility]) - Phone [phone] Fax [phone]   Insurance Phone:   Insurance Fax: [phone]  Comments:

## 2024-07-30 NOTE — TELEPHONE ENCOUNTER
MEDICATION APPEAL APPROVED    Medication: TIRZEPATIDE 5 MG/0.5ML SC SOPN  Authorization Effective Date: 7/11/2024  Authorization Expiration Date: 7/27/2025  Approved Dose/Quantity:   Reference #: [id number]   Appeal Insurance Company: [employer] ([facility]) - Phone [phone] Fax [phone]   Expected CoPay: $       CoPay Card Available:    Financial Assistance Needed:   Filling Pharmacy: [facility] [id number] [place] [address]    Comments:

## 2024-09-30 DIAGNOSIS — E66.01 CLASS 3 SEVERE OBESITY DUE TO EXCESS CALORIES WITH SERIOUS COMORBIDITY AND BODY MASS INDEX (BMI) OF 45.0 TO 49.9 IN ADULT (H): ICD-10-CM

## 2024-09-30 DIAGNOSIS — E66.813 CLASS 3 SEVERE OBESITY DUE TO EXCESS CALORIES WITH SERIOUS COMORBIDITY AND BODY MASS INDEX (BMI) OF 45.0 TO 49.9 IN ADULT (H): ICD-10-CM

## 2024-09-30 DIAGNOSIS — R73.03 BORDERLINE DIABETES: Primary | ICD-10-CM

## 2024-09-30 NOTE — PROGRESS NOTES
Tolerating the 5mg/week Mounjaro well, will increase to 7.5mg/week for a month then increase to 10mg/week and hold there.  Shon Chand MD

## 2024-10-15 ENCOUNTER — IMMUNIZATION (OUTPATIENT)
Dept: FAMILY MEDICINE | Facility: CLINIC | Age: 44
End: 2024-10-15
Payer: COMMERCIAL

## 2024-10-15 ENCOUNTER — TRANSFERRED RECORDS (OUTPATIENT)
Dept: MULTI SPECIALTY CLINIC | Facility: CLINIC | Age: 44
End: 2024-10-15

## 2024-10-15 LAB — RETINOPATHY: NORMAL

## 2024-10-15 PROCEDURE — 90656 IIV3 VACC NO PRSV 0.5 ML IM: CPT

## 2024-10-15 PROCEDURE — 90471 IMMUNIZATION ADMIN: CPT

## 2024-10-26 ENCOUNTER — HEALTH MAINTENANCE LETTER (OUTPATIENT)
Age: 44
End: 2024-10-26

## 2024-12-05 ENCOUNTER — VIRTUAL VISIT (OUTPATIENT)
Dept: SURGERY | Facility: CLINIC | Age: 44
End: 2024-12-05
Attending: EMERGENCY MEDICINE
Payer: COMMERCIAL

## 2024-12-05 VITALS — BODY MASS INDEX: 47.11 KG/M2 | HEIGHT: 62 IN | WEIGHT: 256 LBS

## 2024-12-05 DIAGNOSIS — E66.813 CLASS 3 SEVERE OBESITY DUE TO EXCESS CALORIES WITH SERIOUS COMORBIDITY AND BODY MASS INDEX (BMI) OF 45.0 TO 49.9 IN ADULT (H): ICD-10-CM

## 2024-12-05 DIAGNOSIS — Z98.84 HX OF GASTRIC BYPASS: ICD-10-CM

## 2024-12-05 DIAGNOSIS — R63.5 WEIGHT GAIN FOLLOWING GASTRIC BYPASS SURGERY: ICD-10-CM

## 2024-12-05 DIAGNOSIS — R73.03 BORDERLINE DIABETES: Primary | ICD-10-CM

## 2024-12-05 DIAGNOSIS — Z98.84 WEIGHT GAIN FOLLOWING GASTRIC BYPASS SURGERY: ICD-10-CM

## 2024-12-05 DIAGNOSIS — E66.01 CLASS 3 SEVERE OBESITY DUE TO EXCESS CALORIES WITH SERIOUS COMORBIDITY AND BODY MASS INDEX (BMI) OF 45.0 TO 49.9 IN ADULT (H): ICD-10-CM

## 2024-12-05 DIAGNOSIS — K91.2 POSTOPERATIVE MALABSORPTION: ICD-10-CM

## 2024-12-05 ASSESSMENT — PAIN SCALES - GENERAL: PAINLEVEL_OUTOF10: NO PAIN (0)

## 2024-12-05 NOTE — PROGRESS NOTES
Virtual Visit Details    Type of service:  Video Visit     Originating Location (pt. Location): Other work    Distant Location (provider location):  On-site  Platform used for Video Visit: Dio      Bariatric Follow Up Visit with a History of Previous Bariatric Surgery     Date of visit: 12/4/2024  Physician: Shon Chand MD, MD  Primary Care Provider:  Miriam Ghotra  Israel Reyesgeorge   44 year old  female    Date of Surgery: 2003 in [facility] with Dr. Dey  Initial Weight: 284 lbs (max weight of 301 lbs)  Intake with our program in 2018 at 276 lbs  Borderline diabetes in the past at 6.6% A1c, started ozempic January of 2022 with improvements but with resistant Class III obesity, was transitioned to Mounjaro in August of 2023 after my appeal was granted by her insurance.  Today's Weight:   Wt Readings from Last 1 Encounters:   12/05/24 116.1 kg (256 lb)     Weight history:   Wt Readings from Last 4 Encounters:   12/05/24 116.1 kg (256 lb)   07/11/24 117.5 kg (259 lb)   02/08/24 116.1 kg (256 lb)   01/11/24 116.6 kg (257 lb)      Lab Results   Component Value Date    A1C 5.5 07/12/2024    A1C 5.7 01/11/2024    A1C 5.5 01/12/2023    A1C 5.7 04/08/2022    A1C 6.6 01/11/2022    A1C 6.2 12/18/2020    A1C 6.4 12/27/2018    A1C 5.8 11/09/2016    A1C 5.7 10/29/2014       Body mass index is 46.81 kg/m .      Assessment and Plan     Assessment: Israel is a 44 year old year old female who is 21 years s/p  Dung en Y Gastric Bypass with  Dr Dey at [facility] .  In the interim Mounjaro has been tolerated well at the 10mg/week dose. We'll check labs this winter but plan to continue on this dose for now.     Bariatric labs  are due anytime in the new year. Orders placed today.  Her weight is down 28 lbs from introductory weight, a 10% total body weight reduction, less than average.     Israel Zabala has not fully achieved the goals she hoped to accomplish through bariatric surgery and weight loss. No  revision indicated, we'll continue to optimize dietary guidance for gastric bypass and control emerging diabetes with mounjaro with some additional benefits on her appetite control.     Encounter Diagnoses   Name Primary?    Borderline diabetes Yes    Class 3 severe obesity due to excess calories with serious comorbidity and body mass index (BMI) of 45.0 to 49.9 in adult (H)     Hx of gastric bypass     Postoperative malabsorption     Weight gain following gastric bypass surgery          Current Outpatient Medications:     Calcium Citrate-Vitamin D (CALCIUM CITRATE +D PO), Take 500 mg by mouth daily, Disp: , Rfl:     Cholecalciferol (VITAMIN D) 2000 UNITS tablet, Take 1 tablet by mouth daily, Disp: , Rfl:     Multiple Vitamins-Minerals (MULTIVITAMIN PO), , Disp: , Rfl:     tirzepatide (MOUNJARO) 10 MG/0.5ML pen, Inject 10 mg subcutaneously every 7 days., Disp: 6 mL, Rfl: 1    VITAMIN B-12 PO, 1 TABLET ORALLY DAILY(HOME MED), Disp: , Rfl:     atorvastatin (LIPITOR) 10 MG tablet, Take 1 tablet (10 mg) by mouth daily, Disp: 90 tablet, Rfl: 3    ferrous sulfate (IRON) 325 (65 FE) MG tablet, Take 1 tablet (325 mg) by mouth 2 times daily, Disp: 60 tablet, Rfl: 2    mupirocin (BACTROBAN) 2 % external ointment, Apply topically 3 times daily, Disp: 30 g, Rfl: 0    Plan:  Continue to have bariatric eating routines: protein first at 3-4 meals daily, aiming for 75-85 grams /day (max of 110g/day). Hydrate well between meals with water to hit 80 oz/day.   Continue good vitamin use. You can check vitamin labs when you do your PCP annual check up. Orders placed.  We'll stay at the 10mg/week Mounjaro and see how A1c is responding with your next lab draw.   Anticipate twice yearly visits at this point unless issues are arising.   Great work getting moving again on treadmill/walking. Have one longer walk weekly and at least one walk weekly that has some hill intervals. Building to 200minutes/week is a great goal by Spring.     No  "follow-ups on file.    Bariatric Surgery Review     Interim History/LifeChanges: overall been doing well. Feeling \"really good\" with increased activity, back on treadmill in the last 6 weeks now and happy with some fitness progress. Weight loss slowly progressing but overall feels much better and more in control of appetite/life in general.    Prioritzing protein, craving it to some degree. Cottage cheese filling in some of her protein gaps.     Patient Concerns: checking in  Appetite (1-10): well controlled  GERD: minimal, doesn't use anything regularly. .      Medication changes: no    Vitamin Intake:   B-12   yes   MVI  yes   Vitamin D  yes   Calcium   yes     Other  iron              LABS: ordered     [facility] Bariatric    Question 12/3/2024 12:05 PM CST - Filed by Patient   THESE QUESTIONS ARE ABOUT YOUR WEIGHT    How has your weight changed since your last visit? I have stayed about the same   What is your lowest weight since surgery? (In pounds) 198   Are you currently taking any weight loss medications? Yes   What is your highest lifetime weight? 301   I had the following weight loss procedure: Dung-en-y Gastric Bypass   What year was your surgery? 2003   THESE QUESTIONS ARE ABOUT YOUR DIET AND PHYSICAL ACTIVITY    How many meals do you eat per day? 3   Do you snack between meals? No   How much food are you eating at each meal? 1/2 cup to 1 cup   Are you able to separate your meals and liquids by at least 30 minutes? Yes   Are you able to avoid liquid calories? Sometimes   Do you avoid NSAIDs such as (Ibuprofen, Aleve, Naproxen, Advil)? Yes   How often do you exercise? 1 to 2 times per week   What is the duration of your exercise (in minutes)? 20 Minutes   What types of exercise do you do? walking    climbing stairs at work   What keeps you from being more active? Lack of Time   Are you smoking? No   Are you drinking alcohol? No   Review of symptoms: Please check the following symptoms you have " experienced within the last 30 days.    Vomiting: No   Diarrhea: No   Constipation: Yes   Swallowing trouble: No   Heartburn: Yes   Abdominal pain: No   Rash in skin folds: No   Depression: No   Anxiety: No   Stress urinary incontinence No   Female only: Loss of menstrual cycles    Irregular menstrual cycles   Please teresita all conditions you had prior to having weight loss surgery:    Diabetes II: Never   High Blood Pressure: Improved   High cholesterol: Improved   Heartburn/Reflux: Improved   Sleep apnea: Never   Pre-diabetes: Improved   PCOS: Never   Back pain: Improved   Joint pain: Improved   Lower leg swelling: Never   THESE QUESTIONS ASK ABOUT CURRENT HEALTH CONCERNS    Have you been to the Emergency room since your last visit with us? No   Were you in the hospital since your last visit with us? No   Do you have any concerns today? None   Do you currently have any of the following: Heartburn, acid reflux, or GERD (acid reflux disease)?   Are you taking daily medication for heartburn, acid reflux, or GERD (acid reflux disease)? No   Do you have a band? No     Promis-10   [id number] [employer] Version 1.1    Question 12/3/2024 12:06 PM CST - Filed by Patient   In general, would you say your health is: Very good   In general, would you say your quality of life is: Very good   In general, how would you rate your physical health? Good   In general, how would you rate your mental health, including your mood and your ability to think? Very good   In general, how would you rate your satisfaction with your social activities and relationships? Very good   In general, please rate how well you carry out your usual social activities and roles. (This includes activities at home, at work and in your community, and responsibilities as a parent, child, spouse, employee, friend, etc.) Very good   To what extent are you able to carry out your everyday physical activities such as  "walking, climbing stairs, carrying groceries, or moving a chair? Completely   In the past 7 days    How often have you been bothered by emotional problems such as feeling anxious, depressed or irritable? Rarely   How would you rate your fatigue on average? Mild   How would you rate your pain on average?   0 = No Pain  to  10 = Worst Imaginable Pain 1         Most recent labs:  Lab Results   Component Value Date    WBC 9.2 2022    HGB 13.6 2022    HCT 41.0 2022    MCV 93 2022     2022     Lab Results   Component Value Date    CHOL 163 2024     Lab Results   Component Value Date    HDL 69 2024     No components found for: \"LDLCALC\"  Lab Results   Component Value Date    TRIG 78 2024     No results found for: \"CHOLHDL\"  Lab Results   Component Value Date    ALT 33 2024    AST 32 2024    ALKPHOS 109 2024     No results found for: \"HGBA1C\"  Lab Results   Component Value Date    B12 1,809 (H) 2024     No components found for: \"VITDT1\"  Lab Results   Component Value Date    KETAN 42 2023     Lab Results   Component Value Date    PTHI 43 2024     Lab Results   Component Value Date    ZN 65.6 2022     Lab Results   Component Value Date    VIB1WB 141 2024     Lab Results   Component Value Date    TSH 1.98 2024     No results found for: \"TEST\"        Social History     Social History     Socioeconomic History    Marital status:      Spouse name: Not on file    Number of children: 1    Years of education: Not on file    Highest education level: Not on file   Occupational History    Occupation:    Tobacco Use    Smoking status: Former     Current packs/day: 0.00     Average packs/day: 0.1 packs/day for 8.0 years (0.8 ttl pk-yrs)     Types: Cigarettes     Start date: 2000     Quit date: 2008     Years since quittin.2    Smokeless tobacco: Never   Vaping Use    Vaping status: Never Used "   Substance and Sexual Activity    Alcohol use: Yes     Comment: occasional    Drug use: No    Sexual activity: Yes     Partners: Male     Birth control/protection: None     Comment: NFP   Other Topics Concern    Parent/sibling w/ CABG, MI or angioplasty before 65F 55M? No   Social History Narrative    Not on file     Social Drivers of Health     Financial Resource Strain: Low Risk  (1/10/2024)    Financial Resource Strain     Within the past 12 months, have you or your family members you live with been unable to get utilities (heat, electricity) when it was really needed?: No   Food Insecurity: Low Risk  (1/10/2024)    Food Insecurity     Within the past 12 months, did you worry that your food would run out before you got money to buy more?: No     Within the past 12 months, did the food you bought just not last and you didn t have money to get more?: No   Transportation Needs: Low Risk  (1/10/2024)    Transportation Needs     Within the past 12 months, has lack of transportation kept you from medical appointments, getting your medicines, non-medical meetings or appointments, work, or from getting things that you need?: No   Physical Activity: Not on file   Stress: Not on file   Social Connections: Not on file   Interpersonal Safety: Low Risk  (1/11/2024)    Interpersonal Safety     Do you feel physically and emotionally safe where you currently live?: Yes     Within the past 12 months, have you been hit, slapped, kicked or otherwise physically hurt by someone?: No     Within the past 12 months, have you been humiliated or emotionally abused in other ways by your partner or ex-partner?: No   Housing Stability: Low Risk  (1/10/2024)    Housing Stability     Do you have housing? : Yes     Are you worried about losing your housing?: No       Past Medical History     Past Medical History:   Diagnosis Date    Abnormal glandular Papanicolaou smear of cervix 2002    Negative colpo per patient    Cancer (H) 11/7/2013     focally positive for metastatic carcinoid tumor- [facility]    Chickenpox     Chronic maxillary sinusitis 03/31/2006    CTS (carpal tunnel syndrome) 03/06/2014    Situational anxiety 12/13/2012    Related to her cancer diagnosis     Past Surgical History:   Procedure Laterality Date    BRONCHOSCOPY FLEXIBLE  11/01/2013    related to carcinoid;  Flexible Bronchoscopy, Endobronchial Flexible Ultrasound;  Surgeon: Chris Sal MD;  Location: [facility] OR    CHOLECYSTECTOMY      ENDOBRONCHIAL ULTRASOUND FLEXIBLE  11/01/2013    Procedure: ENDOBRONCHIAL ULTRASOUND FLEXIBLE;;  Surgeon: Chris Sal MD;  Location: [facility] OR    GASTRIC BYPASS  2003    HERNIA REPAIR      2011, supraumbilical    HERNIORRHAPHY INCISIONAL (LOCATION)  08/19/2011    Procedure:HERNIORRHAPHY INCISIONAL (LOCATION); Incisional Hernia Repair upper abdomen midline      LUNG LOBECTOMY      for carcinoid, done at [place]    REVISION KATHY-EN-Y      TONSILLECTOMY  age 7    ZZC REMOVAL OF LUNG,BILOBECTOMY Right 11/22/2013    Carcinoid: Right Middle and Lower Lobe       Problem List     Patient Active Problem List   Diagnosis    Obesity, Class III, BMI 40-49.9 (morbid obesity) (H)    CARDIOVASCULAR SCREENING; LDL GOAL LESS THAN 160    Malignant carcinoid tumor (H)    Morbid obesity, unspecified obesity type (H)    Hx of gastric bypass    Excessive bleeding in premenopausal period    White coat syndrome without diagnosis of hypertension    Nipple discharge in female    Postoperative malabsorption    Secondary hyperparathyroidism, non-renal (H)    Hyperlipidemia LDL goal <70    Prediabetes     Medications     [unfilled]  Surgical History     Past Surgical History  She has a past surgical history that includes gastric bypass (2003); tonsillectomy (age 7); Herniorrhaphy incisional (location) (08/19/2011); REMOVAL OF LUNG,BILOBECTOMY (Right, 11/22/2013); Bronchoscopy flexible (11/01/2013); Endobronchial ultrasound flexible (11/01/2013);  "Revision Dung-En-Y; hernia repair; Cholecystectomy; and Lung Lobectomy.    Objective-Exam     Constitutional:  Ht 1.575 m (5' 2.01\")   Wt 116.1 kg (256 lb)   BMI 46.81 kg/m    [unfilled]   General:  Pleasant and in no acute distress   Eyes:  EOMI  ENT:  Airway patent    Neck:  Respiratory: Normal respiratory effort, no cough, .  CV:    Gastrointestinal:   Musculoskeletal: muscle mass WNL  Skin: color without pallor or jaundice evident, hair long/thick,   Psychiatric: alert and oriented X3, mood and affect normal    Counseling     We reviewed the important post op bariatric recommendations:  -eating 3 meals daily  -eating protein first, getting >60gm protein daily  -eating slowly, chewing food well  -avoiding/limiting calorie containing beverages  -drinking water 15-30 minutes before or after meals  -limiting restaurant or cafeteria eating to twice a week or less    We discussed the importance of restorative sleep and stress management in maintaining a healthy weight.  We discussed the National Weight Control Registry healthy weight maintenance strategies and ways to optimize metabolism.  We discussed the importance of physical activity including cardiovascular and strength training in maintaining a healthier weight.    We discussed the importance of life-long vitamin supplementation and life-long  follow-up.    Israel was reminded that, to avoid marginal ulcers she should avoid tobacco at all, alcohol in excess, caffeine in excess, and NSAIDS (unless indicated for cardioprotection or othewise and opposed by a PPI).    Shon Chand MD    [facility].  2024  8:50 PM  [phone] (clinic phone)  [phone] (fax)    No images are attached to the encounter.  Medical Decision Makin minutes spent by me on the date of the encounter doing chart review, history and exam, documentation and further activities per the note  "

## 2024-12-05 NOTE — LETTER
12/5/2024      Israel Zabala  [id number]  [facility] [address]      Dear Colleague,    Thank you for referring your patient, Israel Zabala, to the [facility] [place]. Please see a copy of my visit note below.    Virtual Visit Details    Type of service:  Video Visit     Originating Location (pt. Location): Other work    Distant Location (provider location):  On-site  Platform used for Video Visit: [facility]      Bariatric Follow Up Visit with a History of Previous Bariatric Surgery     Date of visit: 12/4/2024  Physician: Shon Chand MD, MD  Primary Care Provider:  Miriam Ghotra  Israel Zabala   44 year old  female    Date of Surgery: 2003 in [facility] with Dr. Dey  Initial Weight: 284 lbs (max weight of 301 lbs)  Intake with our program in 2018 at 276 lbs  Borderline diabetes in the past at 6.6% A1c, started ozempic January of 2022 with improvements but with resistant Class III obesity, was transitioned to Mounjaro in August of 2023 after my appeal was granted by her insurance.  Today's Weight:   Wt Readings from Last 1 Encounters:   12/05/24 116.1 kg (256 lb)     Weight history:   Wt Readings from Last 4 Encounters:   12/05/24 116.1 kg (256 lb)   07/11/24 117.5 kg (259 lb)   02/08/24 116.1 kg (256 lb)   01/11/24 116.6 kg (257 lb)      Lab Results   Component Value Date    A1C 5.5 07/12/2024    A1C 5.7 01/11/2024    A1C 5.5 01/12/2023    A1C 5.7 04/08/2022    A1C 6.6 01/11/2022    A1C 6.2 12/18/2020    A1C 6.4 12/27/2018    A1C 5.8 11/09/2016    A1C 5.7 10/29/2014       Body mass index is 46.81 kg/m .      Assessment and Plan     Assessment: Israel is a 44 year old year old female who is 21 years s/p  Dung en Y Gastric Bypass with  Dr Dey at [facility] .  In the interim Mounjaro has been tolerated well at the 10mg/week dose. We'll check labs this winter but plan to continue on this dose for now.     Bariatric labs  are due anytime in the new  year. Orders placed today.  Her weight is down 28 lbs from introductory weight, a 10% total body weight reduction, less than average.     Israel Zabala has not fully achieved the goals she hoped to accomplish through bariatric surgery and weight loss. No revision indicated, we'll continue to optimize dietary guidance for gastric bypass and control emerging diabetes with mounjaro with some additional benefits on her appetite control.     Encounter Diagnoses   Name Primary?     Borderline diabetes Yes     Class 3 severe obesity due to excess calories with serious comorbidity and body mass index (BMI) of 45.0 to 49.9 in adult (H)      Hx of gastric bypass      Postoperative malabsorption      Weight gain following gastric bypass surgery          Current Outpatient Medications:      Calcium Citrate-Vitamin D (CALCIUM CITRATE +D PO), Take 500 mg by mouth daily, Disp: , Rfl:      Cholecalciferol (VITAMIN D) 2000 UNITS tablet, Take 1 tablet by mouth daily, Disp: , Rfl:      Multiple Vitamins-Minerals (MULTIVITAMIN PO), , Disp: , Rfl:      tirzepatide (MOUNJARO) 10 MG/0.5ML pen, Inject 10 mg subcutaneously every 7 days., Disp: 6 mL, Rfl: 1     VITAMIN B-12 PO, 1 TABLET ORALLY DAILY(HOME MED), Disp: , Rfl:      atorvastatin (LIPITOR) 10 MG tablet, Take 1 tablet (10 mg) by mouth daily, Disp: 90 tablet, Rfl: 3     ferrous sulfate (IRON) 325 (65 FE) MG tablet, Take 1 tablet (325 mg) by mouth 2 times daily, Disp: 60 tablet, Rfl: 2     mupirocin (BACTROBAN) 2 % external ointment, Apply topically 3 times daily, Disp: 30 g, Rfl: 0    Plan:  Continue to have bariatric eating routines: protein first at 3-4 meals daily, aiming for 75-85 grams /day (max of 110g/day). Hydrate well between meals with water to hit 80 oz/day.   Continue good vitamin use. You can check vitamin labs when you do your PCP annual check up. Orders placed.  We'll stay at the 10mg/week Mounjaro and see how A1c is responding with your next lab draw.   Anticipate  "twice yearly visits at this point unless issues are arising.   Great work getting moving again on treadmill/walking. Have one longer walk weekly and at least one walk weekly that has some hill intervals. Building to 200minutes/week is a great goal by Spring.     No follow-ups on file.    Bariatric Surgery Review     Interim History/LifeChanges: overall been doing well. Feeling \"really good\" with increased activity, back on treadmill in the last 6 weeks now and happy with some fitness progress. Weight loss slowly progressing but overall feels much better and more in control of appetite/life in general.    Prioritzing protein, craving it to some degree. Cottage cheese filling in some of her protein gaps.     Patient Concerns: checking in  Appetite (1-10): well controlled  GERD: minimal, doesn't use anything regularly. .      Medication changes: no    Vitamin Intake:   B-12   yes   MVI  yes   Vitamin D  yes   Calcium   yes     Other  iron              LABS: ordered     [facility]    Question 12/3/2024 12:05 PM CST - Filed by Patient   THESE QUESTIONS ARE ABOUT YOUR WEIGHT    How has your weight changed since your last visit? I have stayed about the same   What is your lowest weight since surgery? (In pounds) 198   Are you currently taking any weight loss medications? Yes   What is your highest lifetime weight? 301   I had the following weight loss procedure: Dung-en-y Gastric Bypass   What year was your surgery? 2003   THESE QUESTIONS ARE ABOUT YOUR DIET AND PHYSICAL ACTIVITY    How many meals do you eat per day? 3   Do you snack between meals? No   How much food are you eating at each meal? 1/2 cup to 1 cup   Are you able to separate your meals and liquids by at least 30 minutes? Yes   Are you able to avoid liquid calories? Sometimes   Do you avoid NSAIDs such as (Ibuprofen, Aleve, Naproxen, Advil)? Yes   How often do you exercise? 1 to 2 times per week   What is the duration of your exercise (in minutes)? " 20 Minutes   What types of exercise do you do? walking    climbing stairs at work   What keeps you from being more active? Lack of Time   Are you smoking? No   Are you drinking alcohol? No   Review of symptoms: Please check the following symptoms you have experienced within the last 30 days.    Vomiting: No   Diarrhea: No   Constipation: Yes   Swallowing trouble: No   Heartburn: Yes   Abdominal pain: No   Rash in skin folds: No   Depression: No   Anxiety: No   Stress urinary incontinence No   Female only: Loss of menstrual cycles    Irregular menstrual cycles   Please teresita all conditions you had prior to having weight loss surgery:    Diabetes II: Never   High Blood Pressure: Improved   High cholesterol: Improved   Heartburn/Reflux: Improved   Sleep apnea: Never   Pre-diabetes: Improved   PCOS: Never   Back pain: Improved   Joint pain: Improved   Lower leg swelling: Never   THESE QUESTIONS ASK ABOUT CURRENT HEALTH CONCERNS    Have you been to the Emergency room since your last visit with us? No   Were you in the hospital since your last visit with us? No   Do you have any concerns today? None   Do you currently have any of the following: Heartburn, acid reflux, or GERD (acid reflux disease)?   Are you taking daily medication for heartburn, acid reflux, or GERD (acid reflux disease)? No   Do you have a band? No     [employer]   [id number] [employer] Version 1.1    Question 12/3/2024 12:06 PM CST - Filed by Patient   In general, would you say your health is: Very good   In general, would you say your quality of life is: Very good   In general, how would you rate your physical health? Good   In general, how would you rate your mental health, including your mood and your ability to think? Very good   In general, how would you rate your satisfaction with your social activities and relationships? Very good   In general, please rate how well you carry out your usual social  "activities and roles. (This includes activities at home, at work and in your community, and responsibilities as a parent, child, spouse, employee, friend, etc.) Very good   To what extent are you able to carry out your everyday physical activities such as walking, climbing stairs, carrying groceries, or moving a chair? Completely   In the past 7 days    How often have you been bothered by emotional problems such as feeling anxious, depressed or irritable? Rarely   How would you rate your fatigue on average? Mild   How would you rate your pain on average?   0 = No Pain  to  10 = Worst Imaginable Pain 1         Most recent labs:  Lab Results   Component Value Date    WBC 9.2 01/11/2022    HGB 13.6 01/11/2022    HCT 41.0 01/11/2022    MCV 93 01/11/2022     01/11/2022     Lab Results   Component Value Date    CHOL 163 01/11/2024     Lab Results   Component Value Date    HDL 69 01/11/2024     No components found for: \"LDLCALC\"  Lab Results   Component Value Date    TRIG 78 01/11/2024     No results found for: \"CHOLHDL\"  Lab Results   Component Value Date    ALT 33 07/12/2024    AST 32 07/12/2024    ALKPHOS 109 07/12/2024     No results found for: \"HGBA1C\"  Lab Results   Component Value Date    B12 1,809 (H) 01/11/2024     No components found for: \"VITDT1\"  Lab Results   Component Value Date    KETAN 42 01/12/2023     Lab Results   Component Value Date    PTHI 43 01/11/2024     Lab Results   Component Value Date    ZN 65.6 01/25/2022     Lab Results   Component Value Date    VIB1WB 141 01/11/2024     Lab Results   Component Value Date    TSH 1.98 07/12/2024     No results found for: \"TEST\"        Social History     Social History     Socioeconomic History     Marital status:      Spouse name: Not on file     Number of children: 1     Years of education: Not on file     Highest education level: Not on file   Occupational History     Occupation:    Tobacco Use     Smoking status: Former     " Current packs/day: 0.00     Average packs/day: 0.1 packs/day for 8.0 years (0.8 ttl pk-yrs)     Types: Cigarettes     Start date: 2000     Quit date: 2008     Years since quittin.2     Smokeless tobacco: Never   Vaping Use     Vaping status: Never Used   Substance and Sexual Activity     Alcohol use: Yes     Comment: occasional     Drug use: No     Sexual activity: Yes     Partners: Male     Birth control/protection: None     Comment: NFP   Other Topics Concern     Parent/sibling w/ CABG, MI or angioplasty before 65F 55M? No   Social History Narrative     Not on file     Social Drivers of Health     Financial Resource Strain: Low Risk  (1/10/2024)    Financial Resource Strain      Within the past 12 months, have you or your family members you live with been unable to get utilities (heat, electricity) when it was really needed?: No   Food Insecurity: Low Risk  (1/10/2024)    Food Insecurity      Within the past 12 months, did you worry that your food would run out before you got money to buy more?: No      Within the past 12 months, did the food you bought just not last and you didn t have money to get more?: No   Transportation Needs: Low Risk  (1/10/2024)    Transportation Needs      Within the past 12 months, has lack of transportation kept you from medical appointments, getting your medicines, non-medical meetings or appointments, work, or from getting things that you need?: No   Physical Activity: Not on file   Stress: Not on file   Social Connections: Not on file   Interpersonal Safety: Low Risk  (2024)    Interpersonal Safety      Do you feel physically and emotionally safe where you currently live?: Yes      Within the past 12 months, have you been hit, slapped, kicked or otherwise physically hurt by someone?: No      Within the past 12 months, have you been humiliated or emotionally abused in other ways by your partner or ex-partner?: No   Housing Stability: Low Risk  (1/10/2024)     Housing Stability      Do you have housing? : Yes      Are you worried about losing your housing?: No       Past Medical History     Past Medical History:   Diagnosis Date     Abnormal glandular Papanicolaou smear of cervix 2002    Negative colpo per patient     Cancer (H) 11/7/2013    focally positive for metastatic carcinoid tumor- [facility]     Chickenpox      Chronic maxillary sinusitis 03/31/2006     CTS (carpal tunnel syndrome) 03/06/2014     Situational anxiety 12/13/2012    Related to her cancer diagnosis     Past Surgical History:   Procedure Laterality Date     BRONCHOSCOPY FLEXIBLE  11/01/2013    related to carcinoid;  Flexible Bronchoscopy, Endobronchial Flexible Ultrasound;  Surgeon: Chris Sal MD;  Location: [facility] OR     CHOLECYSTECTOMY       ENDOBRONCHIAL ULTRASOUND FLEXIBLE  11/01/2013    Procedure: ENDOBRONCHIAL ULTRASOUND FLEXIBLE;;  Surgeon: Chris Sal MD;  Location: [facility] OR     GASTRIC BYPASS  2003     HERNIA REPAIR      2011, supraumbilical     HERNIORRHAPHY INCISIONAL (LOCATION)  08/19/2011    Procedure:HERNIORRHAPHY INCISIONAL (LOCATION); Incisional Hernia Repair upper abdomen midline       LUNG LOBECTOMY      for carcinoid, done at [facility]     REVISION KATHY-EN-Y       TONSILLECTOMY  age 7     ZZC REMOVAL OF LUNG,BILOBECTOMY Right 11/22/2013    Carcinoid: Right Middle and Lower Lobe       Problem List     Patient Active Problem List   Diagnosis     Obesity, Class III, BMI 40-49.9 (morbid obesity) (H)     CARDIOVASCULAR SCREENING; LDL GOAL LESS THAN 160     Malignant carcinoid tumor (H)     Morbid obesity, unspecified obesity type (H)     Hx of gastric bypass     Excessive bleeding in premenopausal period     White coat syndrome without diagnosis of hypertension     Nipple discharge in female     Postoperative malabsorption     Secondary hyperparathyroidism, non-renal (H)     Hyperlipidemia LDL goal <70     Prediabetes     Medications     [unfilled]  Surgical  "History     Past Surgical History  She has a past surgical history that includes gastric bypass (2003); tonsillectomy (age 7); Herniorrhaphy incisional (location) (08/19/2011); REMOVAL OF LUNG,BILOBECTOMY (Right, 11/22/2013); Bronchoscopy flexible (11/01/2013); Endobronchial ultrasound flexible (11/01/2013); Revision Dung-En-Y; hernia repair; Cholecystectomy; and Lung Lobectomy.    Objective-Exam     Constitutional:  Ht 1.575 m (5' 2.01\")   Wt 116.1 kg (256 lb)   BMI 46.81 kg/m    [unfilled]   General:  Pleasant and in no acute distress   Eyes:  EOMI  ENT:  Airway patent    Neck:  Respiratory: Normal respiratory effort, no cough, .  CV:    Gastrointestinal:   Musculoskeletal: muscle mass WNL  Skin: color without pallor or jaundice evident, hair long/thick,   Psychiatric: alert and oriented X3, mood and affect normal    Counseling     We reviewed the important post op bariatric recommendations:  -eating 3 meals daily  -eating protein first, getting >60gm protein daily  -eating slowly, chewing food well  -avoiding/limiting calorie containing beverages  -drinking water 15-30 minutes before or after meals  -limiting restaurant or cafeteria eating to twice a week or less    We discussed the importance of restorative sleep and stress management in maintaining a healthy weight.  We discussed the National Weight Control Registry healthy weight maintenance strategies and ways to optimize metabolism.  We discussed the importance of physical activity including cardiovascular and strength training in maintaining a healthier weight.    We discussed the importance of life-long vitamin supplementation and life-long  follow-up.    Israel was reminded that, to avoid marginal ulcers she should avoid tobacco at all, alcohol in excess, caffeine in excess, and NSAIDS (unless indicated for cardioprotection or othewise and opposed by a PPI).    Shon Chand MD    [facility].  12/4/2024  8:50 PM  [phone] " (clinic phone)  [phone] (fax)    No images are attached to the encounter.  Medical Decision Makin minutes spent by me on the date of the encounter doing chart review, history and exam, documentation and further activities per the note      Again, thank you for allowing me to participate in the care of your patient.        Sincerely,        Shon Chand MD

## 2024-12-05 NOTE — PATIENT INSTRUCTIONS
Plan:  Continue to have bariatric eating routines: protein first at 3-4 meals daily, aiming for 75-85 grams /day (max of 110g/day). Hydrate well between meals with water to hit 80 oz/day.   Continue good vitamin use. You can check vitamin labs when you do your PCP annual check up. Orders placed.  We'll stay at the 10mg/week Mounjaro and see how A1c is responding with your next lab draw.   Anticipate twice yearly visits at this point unless issues are arising.   Great work getting moving again on treadmill/walking. Have one longer walk weekly and at least one walk weekly that has some hill intervals. Building to 200minutes/week is a great goal by Spring.       Zepbound/Mounjaro (Tirzepatide) is a very effective satiety boosting appetite suppressant that elevates satiety hormones GLP1 and GIP. It needs to be ramped up slowly to be tolerated adequately.  It helps release insulin in response to food when blood sugar runs higher than normal and is very helpful for diabetics in managing blood sugar levels with low risks for any low blood sugars.  About 1/10 people will not tolerate this medication. Each month, you move up to a higher dose until eventually reaching the 10mg/week dose if tolerated with further ramping to follow if needed. If intolerant or severe side effects, a dose decrease would be wise, so keep me posted if not tolerated the ramping well. This may be a longer term medication based on individual needs/physiology and appetite control.     Injections can be given after cleansing the skin with alcohol prep pad or swab (available OTC).     Stop Zepbound if severe abdominal pain/vomiting/rash/throat swelling or constant nausea that prevents adequate food/water intake. Stop 2-3 weeks prior to any planned general anesthesia surgeries to reduce risk for something called a post operative ileus.     Gallstones can occur in about 1% of patients on this medication so update me if increase right upper abdominal pain  after eating.     Start meals with protein first, separate beverages from meals by 20 minutes and work hard in between meals to get your 64-75 oz of water daily to reduce risks for severe constipation. Consider a fiber supplement like powdered psyllium husk in 12 oz water each night, stool softerners as needed and Miralax or milk of Magnesia if more than 3 days have passed without a Bowel Movement. Some other options include:  For Prevention and Treatment of Constipation when on Semaglutide     From least aggressive to most aggressive:     Move: Wallking is essential - the more we move, the more our bowels move  Water: Drink water - 64oz or more a day  Go when you need to go. Don't wait. The longer you wait, the harder it gets.  Fiber: Fruit, raw veggies, nuts, whole grains, prune each night, flax/aden seeds added into meals can all be helpful.   Stool Softeners: if constipation is mild and for maintenance  Gentle laxatives: Miralax, senokot, dulcolax , Smooth move tea as needed     More aggressive (and typically won't get to this point)  Milk of Magnesia  Mag Citrate (what you drink before a colonoscopy)  Suppositories  Enema      Check out [employer] for patient resources.  If you have weekends off, I recommend dosing Friday evenings.     Some people starve on this medication if not mindful about food intake. I recommend starting meals with the protein part of your meal first, chew thoroughly and separate beverages from meals by about 20 minutes to make sure you get your nourishment in first. Include vegetables/complex carbohydrates and unsaturated fat as part of your balanced diet but group these at the end of the meal, after your protein is mostly gone. Satiety will kick in too early if drinking too much with meals and under-nourishment can result.     It's not a bad idea to take a complete multivitamin most days of the week if using this medication. Lower iron content tends to be less constipating.      Adequate hydration is essential for feeling your best, efficient fat burning, waste elimination and constipation prevention. For those without fluid restrictions due to other disease, the goal is at least one ounce of water per gram of protein consumed with a  minimum of 64oz/day goal.     Pancreatitis is a very rare but potentially serious side effect. Stop Zepbound if severe mid abdominal pain/burning in nature or if unable to eat/drink due to severe nausea/discomfort.   People with strong history of pancreatitis without clear cause should stay clear of this medication as should those planning to get pregnant, those with strong personal or family history for medullary thyroid cancer or Multiple Endocrine Neoplasia (rare).     Stop Zepbound at least 2 weeks prior to any planned surgery.  Stop until fully recovered if unexpected/emergent surgery is needed with anticipation that re-ramping will be needed if off longer than 14-16 days since last dose.    Kind Regards,  Shon Chand MD  [facility]      LEAN PROTEIN SOURCES  Getting 20-30 grams of protein, 3 meals daily, is appropriate for most people, some need more but more than about 40 grams per meal is not useful.  General rule is drinking one ounce of water per gram of protein eaten over the course of the day:  70 grams of protein each day, drink 70 oz of water.  Protein Source Portion Calories Grams of Protein                           Nonfat, plain Greek yogurt    (10 grams sugar or less) 3/4 cup (6 oz)  12-17   Light Yogurt (10 grams sugar or less) 3/4 cup (6 oz)  6-8   Protein Shake 1 shake 110-180 15-30   Skim/1% Milk or lactose-free milk 1 cup ( 8 oz)  8   Plain or light, flavored soymilk 1 cup  7-8   Plain or light, hemp milk 1 cup 110 6   Fat Free or 1% Cottage Cheese 1/2 cup 90 15   Part skim ricotta cheese 1/2 cup 100 14   Part skim or reduced fat cheese slices 1 ounce 65-80 8      Mozzarella String Cheese 1 80 8   Canned tuna, chicken, crab or salmon  (canned in water)  1/2 cup 100 15-20   White fish (broiled, grilled, baked) 3 ounces 100 21   Erbacon/Tuna (broiled, grilled, baked) 3 ounces 150-180 21   Shrimp, Scallops, Lobster, Crab 3 ounces 100 21   Pork loin, Pork Tenderloin 3 ounces 150 21   Boneless, skinless chicken /turkey breast                          (broiled, grilled, baked) 3 ounces 120 21   [place], [place], [place], and Venison 3 ounces 120 21   Lean cuts of red meat and pork (sirloin,   round, tenderloin, flank, ground 93%-96%) 3 ounces 170 21   Lean or Extra Lean Ground Turkey 1/2 cup 150 20   90-95% Lean [place] Burger 1 chong 140-180 21   Low-fat casserole with lean meat 3/4 cup 200 17   Luncheon Meats                                                        (turkey, lean ham, roast beef, chicken) 3 ounces 100 21   Egg (boiled, poached, scrambled) 1 Egg 60 7   Egg Substitute 1/2 cup 70 10   Nuts (limit to 1 serving per day)  3 Tbsp. 150 7   Nut Farmers (peanut, almond)  Limit to 1 serving or less daily 1 Tbsp. 90 4   Soy Burger (varies) 1  15   Garbanzo, Black, [place] Beans 1/2 cup 110 7   Refried Beans 1/2 cup 100 7   Kidney and Lima beans 1/2 cup 110 7   Tempeh 3 oz 175 18   Vegan crumbles 1/2 cup 100 14   Tofu 1/2 cup 110 14   Chili (beans and extra lean beef or turkey) 1 cup 200 23   Lentil Stew/Soup 1 cup 150 12   Black Bean Soup 1 cup 175 12       [facility]  Nutritional Guidelines  Gastric Bypass 18 Months Post Op and Beyond    General Guidelines and Helpful Hints:  Eat 3 meals per day + protein supplement(s). No snacks between meals.  Do not skip meals.  This can cause overeating at the next meal and will prevent adequate protein and nutritional intake.  Aim for 60-80 grams of protein per day.  Always eat your protein first. This assists with optimal nutrition and helps you stay full longer.  Depending on your portion size, you may need to drink  "approved protein supplement between meals to achieve protein goals. Follow recommendations of your Dietitian.   Eat your protein first, and then follow with fiber.   It is not necessary to count your fiber, but 15-20 grams per day is recommended.    Add fiber by including fruits, vegetables, whole grains, and beans.   Portions should remain about 1 cup per meal. Use measuring cups to be accurate.  Continue to use saucer/salad plates, infant/toddler silverware to keep portion sizes small and take small bites.  Eat S-L-O-W-L-Y to make each meal last 20-30 minutes. Always stop eating when satisfied.  Continue to use caution with foods containing skins, peels or membranes. Chew well!  Aim for 64 oz. of calorie-free fluids daily.  Continue to avoid caffeine and carbonation. If you choose to drink alcohol, do so in moderation.   Remember to avoid drinking during meals, 15-30 minutes before and 30 minutes after.  Exercise is gonzalez for continued weight loss and weight maintenance. 150 minutes weekly of moderate aerobic activity or 75 minutes of vigorous with 2 days or more a week of strength training. Try to get 20% or more of your steps each day at a brisk pace, as though hurrying to a bus stop. Look to get stronger this year.  If having trouble tolerating meat, try using a crock-pot, tinfoil tent, steamer or other moist cooking method to create tender meats. Add broth or low-fat gravy to help meat stay moist.   Avoid high sugar and high fat foods to prevent dumping syndrome.  Check nutrition labels for less than 10 grams of sugar and less than 10 grams of fat per serving.  Continue Taking Vitamins/Minerals:  1000 mcg of Sublingual B-12 at least 3 days weekly to average 350-500mcg/day. If using 2500mcg lozenges, 2 weekly.  If 5000 mcg, once weekly dosing works.  1 Complete Multivitamin with 18mg Iron twice daily (chewable or swallow tabs). Often sold as \"women's one a day\" if tablet but take twice daily.  500-600 mg Calcium " "Citrate twice daily (chewable or swallow tabs).  5000 IU Vitamin D3 daily.  If menstruating, you may need closer to 60mg of iron daily to prevent iron deficiency. An occasional \"boost\" of extra iron supplement during/after is reasonable if heavy flow.    Sample Grocery List    Protein:  Fat free Greek or light yogurt (less than 10 grams sugar)  Fat free or low-fat cottage cheese  String cheese or reduced fat cheese slices  Tuna, salmon, crab, egg, or chicken salad made with light or fat free mayonnaise  Egg or Egg Substitute  Lean/extra lean turkey, beef, bison, venison (ground, sirloin, round, flank)  Pork loin or tenderloin (grilled, baked, broiled)  Fish such as salmon, tuna, trout, tilapia, etc. (grilled, baked, broiled)  Tender cuts of lean (skinless) turkey or chicken  Lean deli meats: turkey, lean ham, chicken, lean roast beef  Beans such as kidney, garbanzo, black, landeros, or low-fat/fat free refried beans  Peanut butter (natural preferred). Limit to 1 Tbsp. per day.  Low-fat meatloaf (made with lean ground beef or turkey)  Sloppy Joes made with low-sugar ketchup and lean ground beef or turkey  Soy or vegetable protein (i.e. vegan crumbles, soy/veggie burger, tofu)  Hummus    Vegetables:  Fresh: cooked or raw (as tolerated)  Frozen vegetables  Canned vegetables (low sodium or no salt added, rinse before cooking/eating)  (Ok to have skins/peels/membranes/seeds - just chew well)    Fruits:  Fresh fruit  Frozen fruit (no sugar added)  Canned fruit (packed in its own juice, NOT syrup)  (Ok to have skins/peels/membranes/seeds - just chew well)    Starch:  Unsweetened whole-grain hot cereal (or high fiber cold cereal, dry)  Toasted whole wheat bread or [place] Thins  Whole grain crackers  Baked /boiled/mashed potato/sweet potato  Cooked whole grain pasta, brown rice, or other cooked whole grains  Starchy vegetables: corn, peas, winter squash    Protein Supplement:   Ready to drink protein shake with:  15-30 grams " protein per serving  Less than 10 grams total carbohydrate per serving   Protein powder mixed with:   Skim or 1% milk  Low fat or fat free Lactaid milk, plain or no sugar added soymilk  Water     Fats: (use in moderation)  1 teaspoon of soft tub margarine  1 teaspoon olive oil, canola oil, or peanut oil  1 tablespoon of low-fat frank or salad dressing     Sample Menu for 18+ months after Gastric Bypass    You do NOT need to eat/drink the full portion sizes listed below  Always stop when you are satisfied    Breakfast   cup 1% cottage cheese     cup mixed berries   Lunch 2 oz lean roast beef on   [place] Thin with 1 tsp. light frank    small tomato, chopped, mixed with 1 tsp. light vinaigrette dressing   Supplement Approved protein supplement (if needed between meals)   Dinner 2 oz grilled salmon    cup salad greens with 1 tsp. light salad dressing and 1 tsp. ground flax seed    cup quinoa or brown rice     Breakfast   cup egg substitute with   cup sautéed chopped vegetables  2 light [place] Krisp crackers   Lunch Tuna Melt:   cup tuna mixed with 1 tsp. light frank over   [place] Thin. Top with 2-3 slices cucumber and 1 oz slice of low fat cheese   Supplement 1 cup skim milk (if needed between meals)   Dinner 3 oz  grilled, broiled, or baked seasoned skinless chicken breast    cup asparagus     Breakfast   cup plain oatmeal made with skim or 1% milk with 1 Tbsp. flavored/unflavored protein powder added  1 mozzarella string cheese   Lunch 2 oz deli turkey breast  1/3 cup salad with 1 tsp. light salad dressing, 1/8 of a whole avocado and 1 Tbsp. sunflower seeds   Dinner 3 oz. pork loin made in a crock pot, seasoned with a spice rub    cup cooked carrots   Supplement Approved protein supplement (if needed between meals)     Breakfast 1 cup breakfast casserole made with egg substitute, turkey sausage,  and steamed, chopped bell peppers   Supplement  1 cup light Greek yogurt (if needed between meals)   Lunch 2 oz. teriyaki  turkey    cup mashed sweet potato with 1-2 spritzes of spray butter    cup fresh pineapple   Dinner 3 oz low fat meatloaf    cup roasted garlic zucchini     Breakfast   cup leftover breakfast casserole    cup no sugar added applesauce with 1 Tbsp. unflavored protein powder and a sprinkle of cinnamon    Lunch 3 oz shrimp with 1-2 Tbsp. low-sugar cocktail sauce for dipping    c. whole wheat pasta drizzled with   tsp. olive oil   Supplement 1 cup skim/1% milk with scoop of protein powder (if needed between meals)   Dinner Grilled, seasoned kebob with 2 oz lean beef and   cup vegetables     Breakfast Breakfast pizza:   [place] Thin spread with 1 Tbsp. low sugar spaghetti sauce,   cup shredded low fat cheese, melted and 1 slice of [place] harmon     cup fresh fruit mixed with chopped almonds   Lunch   cup black bean soup  4-5 whole grain crackers   Dinner 3 oz  tilapia with lemon pepper seasoning    cup stewed tomatoes   Supplement 1 string cheese (if needed between meals)     Breakfast 2 hard boiled eggs (discard 1 egg yolk)    whole wheat English Muffin with 1 tsp. low sugar jelly   Lunch   cup leftover black bean soup topped with 1-2 Tbsp. low fat cheese  2-3 light [place] Krisp crackers   Supplement Approved protein supplement (if needed between meals)   Dinner 3 oz sirloin steak    cup steamed broccoli    On-the-Go Breakfast Ideas  As of 2015, the latest research shows what a huge impact eating breakfast has on losing weight and feeling your best. People lose more weight when they make breakfast their biggest meal of the day compared to Dinner, but even if you cannot go to that degree, getting a breakfast that has at least 20 grams of protein and even a moderate amount of fat is ideal for maintaining good energy through the day and limits overeating in the evening hours.  The following are some quick and easy suggestions for at least getting something of substance into your body in the morning.  Enjoy!    Eating  breakfast within 90 minutes of waking up is an important part of taking care of your body on a restricted calorie diet plan.  After sleeping for hours, your body is in need of fuel.  An ideal breakfast is a combination of protein, whole-grain carbohydrates, or fruit.  Here s why:    -Protein digests very slowly in the body, helping you feel more satisfied.  -Whole grains provide dietary fiber, which also digests slowly and helps keep your gut clean.  -Fruit is a great source of vitamins, minerals, and fiber.     Each one of these breakfast combinations has between 200-300 calories and 15-20 grams of protein.  Feel free to mix and match!    Bone Broth (chicken bone broth or beef bone broth) is a great way to boost protein content. 8oz of bone broth will typically have 9-12grams of protein for 40kcal of energy.    Protein: Choose  -1/2 cup low-fat cottage cheese  -2 hard boiled eggs , or one cooked in olive oil (low/slow heat).  -1 low fat string cheese stick  -1 TablesSpydrSafe Mobile Securityon natural peanut butter  -Cloudadmin vegetarian sausage chong (found in freezer section)  -1 slice lowfat cheese  -6 oz 2% or lowfat Greek yogurt, such as Fage or Oikos.    PLUS    Whole Grains:  Choose   -1 whole wheat English muffin  -1 whole wheat elizabet, half  -1/2 Fiber One frozen muffin, thawed  -1/2 Fiber One toaster pastry  -1 whole wheat bagel thin  -1/2 cup Kashi cereal  -1 Kashi waffle (or other whole grain high-fiber waffle)  Aim for whole grain/sprouted breads with at least 3g of fiber/slice if having bread. [place] is one such brand.    OR    Fruit: Choose  -1/3 cup blueberries  -1/2 banana (or a plantain- similar to a banana, yet smaller)  -1/2 cup cantaloupe cubes  -1 small apple  -1 small orange  -1/2 cup strawberries  -handful raspberries/blackberries (each berry is about 1 calorie).    *Adapted from Diabetes Living, Fall 20    Ten Breakfasts Under 250 calories    Ideally, getting between 350-600 calories  (depending on  starting height and weight)for breakfast is ideal for avoiding hunger later in the day, adjust/add to the following accordingly:    One- 250 calories, 8.5 g protein  1 slice whole-grain toast   1 Tbsp peanut butter    banana    Two- 250 calories, 8 g protein    cup nonfat/lowfat yogurt  1/3rd cup diced no-sugar peaches  1/3rd cup cereal (like Special K, Cheerios, or bran flakes)    Three- 250 calories, 25 g protein  1 egg scrambled with 1 oz skim milk    cup shredded cheddar    whole grain English muffin  1 oz [place] harmon  1 tsp margarine spread    Four- 225 calories, 25 g protein  1/2 cup Kashi Go-Lean cereal    cup skim milk mixed with 1 scoop Bariatric Advantage protein powder    cup no-sugar diced pears    Five- 250 calories, 20 g protein    cup oatmeal prepared with skim milk, 1 scoop protein powder, and sugar-free maple syrup    Six- 200 calories, 5 g protein  1 whole grain waffle, toasted  1 tablespoon creamy peanut or almond butter    Seven-  250 calories, 19 g protein  Breakfast sandwich: 1 slice whole grain toast, cut in half.  Add 1 scrambled egg and one slice cheddar  cheese.    Eight-  250 calories, 15 g protein  2 eggs scrambled with 1/3 cup frozen spinach (heat before adding to eggs) and 2 tablespoons low fat cream cheese.    Nine-  150 calories, 15 g protein  2/3rd cup cottage cheese    cup cantaloupe    Ten- 200 calories, 20 g protein  Fruit smoothie made with 4 oz. nonfat Greek yogurt,   cup berries, 1 scoop protein powder, and 4 oz skim milk.    Ten Lunches Under 250 Calories    Aim for lunch to be around 300-400 calories a day when trying to lose weight and get that protein in!    One- 200 calories, 11 g protein  1/3 cup tuna salad made with light frank on 1 slice whole grain bread  1 small peeled apple    Two- 250 calories, 16 g protein  1/3 cup lowfat cottage cheese    cup cooked green beans    small fruit cocktail (in natural juice)    Three- 200 calories, 11 g protein    grilled cheese  sandwich on whole grain bread with lowfat cheese  2/3rd cup of tomato soup    Four- 250 calories, 22 g protein  Deli wrap: 1 oz sliced turkey, 1 oz sliced ham, 1 oz sliced chicken rolled up with 1 slice low-fat cheese  1 small orange    Five- 250 calories, 28 g protein  2/3rd cup chili with 1 oz shredded cheese  4 saltine crackers    Six- 250 calories, 22 g protein  1 cup fresh spinach with 2 oz chicken, 1/3rd cup mandarin oranges, and 2 tablespoons sliced almonds with 1 tablespoon  vinaigrette dressing    Seven- 200 calories, 11 g protein  1 Tbsp sugar-free preserves and 1 Tbsp peanut butter on 1 slice whole grain toast    cup nonfat/lowfat Greek yogurt    Eight- 250 calories, 18 g protein  1 small soft-shell chicken taco with 1 oz shredded cheese, lettuce, tomato, salsa, and 1 Tbsp light sour cream    cup black beans    Nine- 225 calories, 13 g protein  2 ounces baked chicken  1/4 cup mashed potatoes    cup green beans    Ten- 200 calories, 21 g protein  Deli elizabet: 2 oz roast beef or other deli meat with 1 tsp [place] mayonnaise and sliced tomato, onion, and lettuce  1/3rd cup cottage cheese      Ten Dinners Under 300 calories    If you're eating a large breakfast and medium lunch, keep dinner small.  300-400 calories is ideal for most people depending on their caloric needs.    One- 300 calories, 12 g protein  1-inch thick slice of turkey meatloaf    cup baked butternut squash    Two- 200 calories, 9 g protein  Bread-less BLT: 3 slices turkey harmon, sliced tomato, wrapped in a large lettuce leaf    cup peeled fruit    Three- 275 calories, 36 g protein  3 oz roasted chicken    cup cooked broccoli    cup shredded cheddar cheese    cup unsweetened applesauce    Four- 200 calories, 25 g protein  3 oz baked tilapia  1/3rd cup cooked carrots    cup yogurt    Five- 250 calories, 20 g protein  Grilled ham  n  Swiss: spread 2 tsp ghee or butter on 1 slice of whole grain bread.  Cut bread in half, layer 2 oz deli ham with 1  piece of Swiss cheese and grill until cheese is melted.    cup cooked vegetables    Six- 250 calories, 18 g protein  Vegetarian cheeseburger: 1 [place] cheeseburger topped with lettuce, onion, tomato, and ketchup/mustard    cup sweet potato fries    Seven- 250 calories, 18 g protein  Pork pot roast: 2 oz roasted pork loin, 1/3rd cup roasted carrots,   medium potato, cooked with   cup gravy    Eight- 330 calories, 25 g protein  2 oz meatballs (about 2 small meatballs)    cup spaghetti sauce  1/2 piece toast topped with 1 tsp ghee or butterand topped with garlic powder, toasted in oven    Nine- 250 calories, 16 g protein  Mexican pizza: one 8  corn tortilla topped with 2 oz chicken,   cup salsa, 2 tablespoons black beans, 2 tablespoons shredded cheese.  Bake until cheese is melted.    Ten- 250 calories, 22 g protein  Shrimp stir-taveras: 3 oz cooked shrimp, 1/6th onion,   pepper,   cup chopped carrots sautéed in 1 tablespoon olive oil, topped with 2 tablespoons stir taveras sauce and a pinch of sesame seeds        150 Calories or Less Snack Ideas   1 hardboiled egg with   cup berries  1 small apple with 1 hardboiled egg  10 almonds with   cup berries  2 clementines with 1 light string cheese  1 light string cheese with   sliced apple  1 light string cheese wrapped in 2 slices of turkey  4 100% whole wheat crackers (e.g. Triscuit) with 1 light string cheese    c. cottage cheese with   cup fruit and 1 Tbsp sunflower seeds     cup cottage cheese with   of an avocado     can tuna fish with 1 cup sliced cucumbers     cup roasted garbanzo beans with paprika and cayenne pepper    baked sweet potato with   cup chili beans or   cup cottage cheese  2 oz. nitrate free turkey slices with 1 cup carrots  1 container (6 oz) of low sugar (less than 10 grams of sugar) greek yogurt   3 Tablespoons of hummus with 1 cup sliced bell peppers   2 Tablespoons of hummus with 15 baby carrots  4 Tablespoons ranch dip made with plain Greek Yogurt and 3  mini cucumbers  1/4 cup nuts (any kind)  1 Tablespoon peanut butter with 1 stalk celery   1 dill pickle wrapped in 1-2 slices of deli ham with 1 tsp of mayonnaise/mustard.

## 2024-12-05 NOTE — NURSING NOTE
Current patient location:  at work    Is the patient currently in the state of [place]? YES    Visit mode:VIDEO    If the visit is dropped, the patient can be reconnected by:VIDEO VISIT: Text to cell phone:   Telephone Information:   Mobile [phone]    and VIDEO VISIT: Send to e-mail at: [email or web address]    Will anyone else be joining the visit? NO  (If patient encounters technical issues they should call [phone]    Are changes needed to the allergy or medication list? No    Are refills needed on medications prescribed by this physician? Discuss with provider    Rooming Documentation:  Questionnaire(s) completed    Reason for visit: ODILON ADAMSF

## 2025-01-08 DIAGNOSIS — E78.5 HYPERLIPIDEMIA LDL GOAL <70: ICD-10-CM

## 2025-01-08 RX ORDER — ATORVASTATIN CALCIUM 10 MG/1
10 TABLET, FILM COATED ORAL DAILY
Qty: 90 TABLET | Refills: 0 | Status: SHIPPED | OUTPATIENT
Start: 2025-01-08

## 2025-01-08 NOTE — TELEPHONE ENCOUNTER
Evette refill given x 1, has upcoming appt 1/10/25 with Miriam Ghotra NP.   Prescription approved per [facility] Refill Protocol.  Julie Behrendt RN

## 2025-01-13 ENCOUNTER — HOSPITAL ENCOUNTER (OUTPATIENT)
Dept: MAMMOGRAPHY | Facility: CLINIC | Age: 45
Discharge: HOME | End: 2025-01-13
Attending: NURSE PRACTITIONER | Admitting: NURSE PRACTITIONER
Payer: COMMERCIAL

## 2025-01-13 DIAGNOSIS — Z12.31 VISIT FOR SCREENING MAMMOGRAM: ICD-10-CM

## 2025-01-13 PROCEDURE — 77067 SCR MAMMO BI INCL CAD: CPT

## 2025-01-13 PROCEDURE — 77063 BREAST TOMOSYNTHESIS BI: CPT

## 2025-02-20 ENCOUNTER — ANCILLARY PROCEDURE (OUTPATIENT)
Dept: GENERAL RADIOLOGY | Facility: CLINIC | Age: 45
End: 2025-02-20
Attending: NURSE PRACTITIONER
Payer: COMMERCIAL

## 2025-02-20 ENCOUNTER — OFFICE VISIT (OUTPATIENT)
Dept: URGENT CARE | Facility: URGENT CARE | Age: 45
End: 2025-02-20
Payer: COMMERCIAL

## 2025-02-20 VITALS
HEART RATE: 77 BPM | RESPIRATION RATE: 16 BRPM | DIASTOLIC BLOOD PRESSURE: 78 MMHG | SYSTOLIC BLOOD PRESSURE: 129 MMHG | TEMPERATURE: 97.8 F | BODY MASS INDEX: 47.19 KG/M2 | WEIGHT: 258 LBS | OXYGEN SATURATION: 100 %

## 2025-02-20 DIAGNOSIS — J40 BRONCHITIS: ICD-10-CM

## 2025-02-20 DIAGNOSIS — R05.1 ACUTE COUGH: Primary | ICD-10-CM

## 2025-02-20 RX ORDER — BENZONATATE 200 MG/1
200 CAPSULE ORAL 3 TIMES DAILY PRN
Qty: 60 CAPSULE | Refills: 0 | Status: SHIPPED | OUTPATIENT
Start: 2025-02-20

## 2025-02-20 RX ORDER — PREDNISONE 20 MG/1
40 TABLET ORAL DAILY
Qty: 10 TABLET | Refills: 0 | Status: SHIPPED | OUTPATIENT
Start: 2025-02-20 | End: 2025-02-25

## 2025-02-20 NOTE — PATIENT INSTRUCTIONS
-See handout on bronchitis.  Push fluids, and rest.  Take steroid for 5 days.  Tessalon pearls up to 3 times a day as needed for cough.  Follow up with your primary provider in the next 7-10 days if not improving as expected. Sooner if worse, ER with trouble breathing.

## 2025-02-20 NOTE — PROGRESS NOTES
SUBJECTIVE:   Israel Zabala is a 44 year old female presenting with a chief complaint of   Chief Complaint   Patient presents with    Cough     Cough x 4 days, mild body aches, fatigued.  Pt had part of her right lung removed r/t disease, worried about her lungs.       No fevers.   No OTC meds tried, rest, and fluids.    Past Medical History:   Diagnosis Date    Abnormal glandular Papanicolaou smear of cervix 2002    Negative colpo per patient    Cancer (H) 11/7/2013    focally positive for metastatic carcinoid tumor- [facility]    Chickenpox     Chronic maxillary sinusitis 03/31/2006    CTS (carpal tunnel syndrome) 03/06/2014    Situational anxiety 12/13/2012    Related to her cancer diagnosis     Family History   Problem Relation Age of Onset    Breast Cancer Maternal Grandmother 65    Cancer Maternal Grandmother         skin    Diabetes Maternal Grandmother     Hypertension Maternal Grandmother     Diabetes Maternal Grandfather     Heart Disease Paternal Grandmother         MI    Cerebrovascular Disease Paternal Grandmother     Heart Disease Paternal Grandfather         MI    Gastrointestinal Disease Brother         Colitis    Prostate Cancer Father     Hyperlipidemia Father     Cancer Mother         skin    Hypertension Mother     Congenital Anomalies Daughter         imperforate anus     Current Outpatient Medications   Medication Sig Dispense Refill    atorvastatin (LIPITOR) 10 MG tablet Take 1 tablet (10 mg) by mouth daily. 90 tablet 3    Calcium Citrate-Vitamin D (CALCIUM CITRATE +D PO) Take 500 mg by mouth daily      Cholecalciferol (VITAMIN D) 2000 UNITS tablet Take 1 tablet by mouth daily      ferrous sulfate (IRON) 325 (65 FE) MG tablet Take 1 tablet (325 mg) by mouth 2 times daily 60 tablet 2    Multiple Vitamins-Minerals (MULTIVITAMIN PO)       tirzepatide (MOUNJARO) 10 MG/0.5ML pen Inject 10 mg subcutaneously every 7 days. 6 mL 1    VITAMIN B-12 PO 1 TABLET ORALLY DAILY(HOME MED)      mupirocin  (BACTROBAN) 2 % external ointment Apply topically 3 times daily (Patient not taking: Reported on 2025) 30 g 0     Social History     Tobacco Use    Smoking status: Former     Current packs/day: 0.00     Average packs/day: 0.1 packs/day for 8.0 years (0.8 ttl pk-yrs)     Types: Cigarettes     Start date: 2000     Quit date: 2008     Years since quittin.4    Smokeless tobacco: Never   Substance Use Topics    Alcohol use: Yes     Comment: occasional       OBJECTIVE  /78   Pulse 77   Temp 97.8  F (36.6  C) (Tympanic)   Resp 16   Wt 117 kg (258 lb)   LMP 2024   SpO2 100%   BMI 47.19 kg/m      Physical Exam  Constitutional:       Appearance: Normal appearance.   HENT:      Head: Normocephalic.   Cardiovascular:      Rate and Rhythm: Normal rate and regular rhythm.      Heart sounds: Normal heart sounds.   Pulmonary:      Effort: Pulmonary effort is normal.      Breath sounds: Wheezing (faint wheeze posterior right middle) present.   Skin:     General: Skin is warm and dry.   Neurological:      Mental Status: She is alert.       I have personally ordered and preliminarily reviewed the following xray, I have noted no pneumonia, post op changes on right.      ASSESSMENT:  1. Acute cough (Primary)    - XR Chest 2 Views; Future  - predniSONE (DELTASONE) 20 MG tablet; Take 2 tablets (40 mg) by mouth daily for 5 days.  Dispense: 10 tablet; Refill: 0  - benzonatate (TESSALON) 200 MG capsule; Take 1 capsule (200 mg) by mouth 3 times daily as needed for cough.  Dispense: 60 capsule; Refill: 0    2. Bronchitis      PLAN:  -See handout on bronchitis.  Push fluids, and rest.  Take steroid for 5 days.  Tessalon pearls up to 3 times a day as needed for cough.  Follow up with your primary provider in the next 7-10 days if not improving as expected. Sooner if worse, ER with trouble breathing.

## 2025-04-03 DIAGNOSIS — R73.03 BORDERLINE DIABETES: Primary | ICD-10-CM

## 2025-04-08 ENCOUNTER — OFFICE VISIT (OUTPATIENT)
Dept: DERMATOLOGY | Facility: CLINIC | Age: 45
End: 2025-04-08
Payer: COMMERCIAL

## 2025-04-08 DIAGNOSIS — L82.1 SEBORRHEIC KERATOSIS: Primary | ICD-10-CM

## 2025-04-08 DIAGNOSIS — D18.01 CHERRY ANGIOMA: ICD-10-CM

## 2025-04-08 DIAGNOSIS — L72.0 MILIA: ICD-10-CM

## 2025-04-08 DIAGNOSIS — D22.9 MULTIPLE BENIGN NEVI: ICD-10-CM

## 2025-04-08 DIAGNOSIS — L81.4 LENTIGO: ICD-10-CM

## 2025-04-08 DIAGNOSIS — L72.0 EIC (EPIDERMAL INCLUSION CYST): ICD-10-CM

## 2025-04-08 PROCEDURE — 99203 OFFICE O/P NEW LOW 30 MIN: CPT | Performed by: PHYSICIAN ASSISTANT

## 2025-04-08 NOTE — LETTER
4/8/2025      Israel Zabala  [address] [place] [address]      Dear Colleague,    Thank you for referring your patient, Israel Zabala, to the [facility]. Please see a copy of my visit note below.    Israel Zabala is a pleasant 44 year old year old female patient here today for skin check. She notes bump on right upper eyelid and left thumb. Present for months. No pain or bleeding. No new or changing nevi.  Patient has no other skin complaints today.  Remainder of the HPI, Meds, PMH, Allergies, FH, and SH was reviewed in chart.    Pertinent Hx:  no personal history of skin cancer.   Past Medical History:   Diagnosis Date     Abnormal glandular Papanicolaou smear of cervix 2002    Negative colpo per patient     Cancer (H) 11/7/2013    focally positive for metastatic carcinoid tumor- [facility]     Chickenpox      Chronic maxillary sinusitis 03/31/2006     CTS (carpal tunnel syndrome) 03/06/2014     Situational anxiety 12/13/2012    Related to her cancer diagnosis       Past Surgical History:   Procedure Laterality Date     BRONCHOSCOPY FLEXIBLE  11/01/2013    related to carcinoid;  Flexible Bronchoscopy, Endobronchial Flexible Ultrasound;  Surgeon: Chris Sal MD;  Location: [facility] OR     CHOLECYSTECTOMY       ENDOBRONCHIAL ULTRASOUND FLEXIBLE  11/01/2013    Procedure: ENDOBRONCHIAL ULTRASOUND FLEXIBLE;;  Surgeon: Chris Sal MD;  Location: [facility] OR     GASTRIC BYPASS  2003     HERNIA REPAIR      2011, supraumbilical     HERNIORRHAPHY INCISIONAL (LOCATION)  08/19/2011    Procedure:HERNIORRHAPHY INCISIONAL (LOCATION); Incisional Hernia Repair upper abdomen midline       LUNG LOBECTOMY      for carcinoid, done at [place]     REVISION KATHY-EN-Y       TONSILLECTOMY  age 7     ZZC REMOVAL OF LUNG,BILOBECTOMY Right 11/22/2013    Carcinoid: Right Middle and Lower Lobe        Family History   Problem Relation Age of Onset     Breast Cancer Maternal Grandmother 65      Cancer Maternal Grandmother         skin     Diabetes Maternal Grandmother      Hypertension Maternal Grandmother      Diabetes Maternal Grandfather      Heart Disease Paternal Grandmother         MI     Cerebrovascular Disease Paternal Grandmother      Heart Disease Paternal Grandfather         MI     Gastrointestinal Disease Brother         Colitis     Prostate Cancer Father      Hyperlipidemia Father      Cancer Mother         skin     Hypertension Mother      Congenital Anomalies Daughter         imperforate anus       Social History     Socioeconomic History     Marital status:      Spouse name: Not on file     Number of children: 1     Years of education: Not on file     Highest education level: Not on file   Occupational History     Occupation:    Tobacco Use     Smoking status: Former     Current packs/day: 0.00     Average packs/day: 0.1 packs/day for 8.0 years (0.8 ttl pk-yrs)     Types: Cigarettes     Start date: 2000     Quit date: 2008     Years since quittin.5     Smokeless tobacco: Never   Vaping Use     Vaping status: Never Used   Substance and Sexual Activity     Alcohol use: Yes     Comment: occasional     Drug use: No     Sexual activity: Yes     Partners: Male     Birth control/protection: None     Comment: NFP   Other Topics Concern     Parent/sibling w/ CABG, MI or angioplasty before 65F 55M? No   Social History Narrative     Not on file     Social Drivers of Health     Financial Resource Strain: Low Risk  (2025)    Financial Resource Strain      Within the past 12 months, have you or your family members you live with been unable to get utilities (heat, electricity) when it was really needed?: No   Food Insecurity: Low Risk  (2025)    Food Insecurity      Within the past 12 months, did you worry that your food would run out before you got money to buy more?: No      Within the past 12 months, did the food you bought just not last and you didn t  have money to get more?: No   Transportation Needs: Low Risk  (1/6/2025)    Transportation Needs      Within the past 12 months, has lack of transportation kept you from medical appointments, getting your medicines, non-medical meetings or appointments, work, or from getting things that you need?: No   Physical Activity: Insufficiently Active (1/6/2025)    Exercise Vital Sign      Days of Exercise per Week: 2 days      Minutes of Exercise per Session: 20 min   Stress: No Stress Concern Present (1/6/2025)    [employer] - Occupational Stress Questionnaire      Feeling of Stress : Only a little   Social Connections: Unknown (1/6/2025)    Social Connection and Isolation Panel [NHANES]      Frequency of Communication with Friends and Family: Not on file      Frequency of Social Gatherings with Friends and Family: Once a week      Attends [employer] Services: Not on file      Active Member of Clubs or Organizations: Not on file      Attends Club or Organization Meetings: Not on file      Marital Status: Not on file   Interpersonal Safety: Low Risk  (1/10/2025)    Interpersonal Safety      Do you feel physically and emotionally safe where you currently live?: Yes      Within the past 12 months, have you been hit, slapped, kicked or otherwise physically hurt by someone?: No      Within the past 12 months, have you been humiliated or emotionally abused in other ways by your partner or ex-partner?: No   Housing Stability: Low Risk  (1/6/2025)    Housing Stability      Do you have housing? : Yes      Are you worried about losing your housing?: No       Outpatient Encounter Medications as of 4/8/2025   Medication Sig Dispense Refill     atorvastatin (LIPITOR) 10 MG tablet Take 1 tablet (10 mg) by mouth daily. 90 tablet 3     benzonatate (TESSALON) 200 MG capsule Take 1 capsule (200 mg) by mouth 3 times daily as needed for cough. 60 capsule 0     Calcium Citrate-Vitamin D (CALCIUM CITRATE +D PO) Take  500 mg by mouth daily       Cholecalciferol (VITAMIN D) 2000 UNITS tablet Take 1 tablet by mouth daily       ferrous sulfate (IRON) 325 (65 FE) MG tablet Take 1 tablet (325 mg) by mouth 2 times daily 60 tablet 2     Multiple Vitamins-Minerals (MULTIVITAMIN PO)        mupirocin (BACTROBAN) 2 % external ointment Apply topically 3 times daily (Patient not taking: Reported on 2/20/2025) 30 g 0     [START ON 4/10/2025] tirzepatide (MOUNJARO) 12.5 MG/0.5ML SOAJ auto-injector pen Inject 0.5 mLs (12.5 mg) subcutaneously once a week for 28 days. 2 mL 0     [START ON 5/8/2025] tirzepatide (MOUNJARO) 15 MG/0.5ML SOAJ auto-injector pen Inject 0.5 mLs (15 mg) subcutaneously every 7 days. 6 mL 2     VITAMIN B-12 PO 1 TABLET ORALLY DAILY(HOME MED)       No facility-administered encounter medications on file as of 4/8/2025.             O:   NAD, WDWN, Alert & Oriented, Mood & Affect wnl, Vitals stable   Here today alone   There were no vitals taken for this visit.   General appearance normal   Vitals stable   Alert, oriented and in no acute distress       Small white papule on upper eyelid near eyelid margin   Skin colored small subcutaneous nodule on left thumb   Stuck on papules and brown macules on trunk and ext   Red papules on trunk  Brown papules and macules with regular pigment network and borders on torso and extremities      The remainder of skin exam is normal       Eyes: Conjunctivae/lids:Normal     ENT: Lips, mucosa: normal    MSK:Normal    Cardiovascular: peripheral edema none    Pulm: Breathing Normal    Neuro/Psych: Orientation:Alert and Orientedx3 ; Mood/Affect:normal     A/P:  1. Seborrheic keratosis, lentigo, angioma, benign nevi, epidermal cyst on left thumb, milia on eyelid margin  It was a pleasure speaking to Israel Zabala today.  Discussed cyst excision and milia removal with Dr. Valdez.   BENIGN LESIONS DISCUSSED WITH PATIENT:  I discussed the specifics of tumor, prognosis, and genetics of benign lesions.   I explained that treatment of these lesions would be purely cosmetic and not medically neccessary.  I discussed with patient different removal options including excision, cautery and /or laser.      Nature and genetics of benign skin lesions dicussed with patient.  Signs and Symptoms of skin cancer discussed with patient.  ABCDEs of melanoma reviewed with patient.  Patient encouraged to perform monthly skin exams.  UV precautions reviewed with patient.  Risks of non-melanoma skin cancer discussed with patient   Return to clinic in two years or sooner if needed.       Again, thank you for allowing me to participate in the care of your patient.        Sincerely,        Kelly Cartagena PA-C    Electronically signed

## 2025-04-08 NOTE — PATIENT INSTRUCTIONS
Proper skin care from [facility]:    -Eliminate harsh soaps as they strip the natural oils from the skin, often resulting in dry itchy skin ( i.e. Dial, Zest, Burkinan Spring)  -Use mild soaps such as Cetaphil or Dove Sensitive Skin in the shower. You do not need to use soap on arms, legs, and trunk every time you shower unless visibly soiled.   -Avoid hot or cold showers.  -After showering, lightly dry off and apply moisturizing within 2-3 minutes. This will help trap moisture in the skin.   -Aggressive use of a moisturizer at least 1-2 times a day to the entire body (including -Vanicream, Cetaphil, Aquaphor or Cerave) and moisturize hands after every washing.  -We recommend using moisturizers that come in a tub that needs to be scooped out, not a pump. This has more of an oil base. It will hold moisture in your skin much better than a water base moisturizer. The above recommended are non-pore clogging.      Wear a sunscreen with at least SPF 30 on your face, ears, neck and V of the chest daily. Wear sunscreen on other areas of the body if those areas are exposed to the sun throughout the day. Sunscreens can contain physical and/or chemical blockers. Physical blockers are less likely to clog pores, these include zinc oxide and titanium dioxide. Reapply every two hour and after swimming.     Sunscreen examples: https://www.ewg.org/sunscreen/    UV radiation  UVA radiation remains constant throughout the day and throughout the year. It is a longer wavelength than UVB and therefore penetrates deeper into the skin leading to immediate and delayed tanning, photoaging, and skin cancer. 70-80% of UVA and UVB radiation occurs between the hours of 10am-2pm.  UVB radiation  UVB radiation causes the most harmful effects and is more significant during the summer months. However, snow and ice can reflect UVB radiation leading to skin damage during the winter months as well. UVB radiation is responsible for tanning,  burning, inflammation, delayed erythema (pinkness), pigmentation (brown spots), and skin cancer.     I recommend self monthly full body exams and yearly full body exams with a dermatology provider. If you develop a new or changing lesion please follow up for examination. Most skin cancers are pink and scaly or pink and pearly. However, we do see blue/brown/black skin cancers.  Consider the ABCDEs of melanoma when giving yourself your monthly full body exam ( don't forget the groin, buttocks, feet, toes, etc). A-asymmetry, B-borders, C-color, D-diameter, E-elevation or evolving. If you see any of these changes please follow up in clinic. If you cannot see your back I recommend purchasing a hand held mirror to use with a larger wall mirror.       Checking for Skin Cancer  You can find cancer early by checking your skin each month. There are 3 kinds of skin cancer. They are melanoma, basal cell carcinoma, and squamous cell carcinoma. Doing monthly skin checks is the best way to find new marks or skin changes. Follow the instructions below for checking your skin.   The ABCDEs of checking moles for melanoma   Check your moles or growths for signs of melanoma using ABCDE:   Asymmetry: the sides of the mole or growth don t match  Border: the edges are ragged, notched, or blurred  Color: the color within the mole or growth varies  Diameter: the mole or growth is larger than 6 mm (size of a pencil eraser)  Evolving: the size, shape, or color of the mole or growth is changing (evolving is not shown in the images below)    Checking for other types of skin cancer  Basal cell carcinoma or squamous cell carcinoma have symptoms such as:     A spot or mole that looks different from all other marks on your skin  Changes in how an area feels, such as itching, tenderness, or pain  Changes in the skin's surface, such as oozing, bleeding, or scaliness  A sore that does not heal  New swelling or redness beyond the border of a  mole    Who s at risk?  Anyone can get skin cancer. But you are at greater risk if you have:   Fair skin, light-colored hair, or light-colored eyes  Many moles or abnormal moles on your skin  A history of sunburns from sunlight or tanning beds  A family history of skin cancer  A history of exposure to radiation or chemicals  A weakened immune system  If you have had skin cancer in the past, you are at risk for recurring skin cancer.   How to check your skin  Do your monthly skin checkups in front of a full-length mirror. Check all parts of your body, including your:   Head (ears, face, neck, and scalp)  Torso (front, back, and sides)  Arms (tops, undersides, upper, and lower armpits)  Hands (palms, backs, and fingers, including under the nails)  Buttocks and genitals  Legs (front, back, and sides)  Feet (tops, soles, toes, including under the nails, and between toes)  If you have a lot of moles, take digital photos of them each month. Make sure to take photos both up close and from a distance. These can help you see if any moles change over time.   Most skin changes are not cancer. But if you see any changes in your skin, call your doctor right away. Only he or she can diagnose a problem. If you have skin cancer, seeing your doctor can be the first step toward getting the treatment that could save your life.   [employer] last reviewed this educational content on 4/1/2019 2000-2020 [employer]. [address] [place] [address], [place] [address]. All rights reserved. This information is not intended as a substitute for professional medical care. Always follow your healthcare professional's instructions.       When should I call my doctor?  If you are worsening or not improving, please, contact us or seek urgent care as noted below.     Who should I call with questions (adults)?    [facility] [phone]  For urgent needs outside of business hours call the [facility] at  [phone] and ask for the dermatology resident on call to be paged  If this is a medical emergency and you are unable to reach an ER, Call 911      If you need a prescription refill, please contact your pharmacy. Refills are approved or denied by our Physicians during normal business hours, Monday through Friday.  Per office policy, refills will not be granted if you have not been seen within the past year (or sooner depending on the condition).   Family

## 2025-04-09 NOTE — PROGRESS NOTES
Israel Zabala is a pleasant 44 year old year old female patient here today for skin check. She notes bump on right upper eyelid and left thumb. Present for months. No pain or bleeding. No new or changing nevi.  Patient has no other skin complaints today.  Remainder of the HPI, Meds, PMH, Allergies, FH, and SH was reviewed in chart.    Pertinent Hx:  no personal history of skin cancer.   Past Medical History:   Diagnosis Date    Abnormal glandular Papanicolaou smear of cervix 2002    Negative colpo per patient    Cancer (H) 11/7/2013    focally positive for metastatic carcinoid tumor- [facility]    Chickenpox     Chronic maxillary sinusitis 03/31/2006    CTS (carpal tunnel syndrome) 03/06/2014    Situational anxiety 12/13/2012    Related to her cancer diagnosis       Past Surgical History:   Procedure Laterality Date    BRONCHOSCOPY FLEXIBLE  11/01/2013    related to carcinoid;  Flexible Bronchoscopy, Endobronchial Flexible Ultrasound;  Surgeon: Chris Sal MD;  Location: [facility] OR    CHOLECYSTECTOMY      ENDOBRONCHIAL ULTRASOUND FLEXIBLE  11/01/2013    Procedure: ENDOBRONCHIAL ULTRASOUND FLEXIBLE;;  Surgeon: Chris Sal MD;  Location: [facility] OR    GASTRIC BYPASS  2003    HERNIA REPAIR      2011, supraumbilical    HERNIORRHAPHY INCISIONAL (LOCATION)  08/19/2011    Procedure:HERNIORRHAPHY INCISIONAL (LOCATION); Incisional Hernia Repair upper abdomen midline      LUNG LOBECTOMY      for carcinoid, done at [place]    REVISION KATHY-EN-Y      TONSILLECTOMY  age 7    ZZC REMOVAL OF LUNG,BILOBECTOMY Right 11/22/2013    Carcinoid: Right Middle and Lower Lobe        Family History   Problem Relation Age of Onset    Breast Cancer Maternal Grandmother 65    Cancer Maternal Grandmother         skin    Diabetes Maternal Grandmother     Hypertension Maternal Grandmother     Diabetes Maternal Grandfather     Heart Disease Paternal Grandmother         MI    Cerebrovascular Disease Paternal Grandmother     Heart  Disease Paternal Grandfather         MI    Gastrointestinal Disease Brother         Colitis    Prostate Cancer Father     Hyperlipidemia Father     Cancer Mother         skin    Hypertension Mother     Congenital Anomalies Daughter         imperforate anus       Social History     Socioeconomic History    Marital status:      Spouse name: Not on file    Number of children: 1    Years of education: Not on file    Highest education level: Not on file   Occupational History    Occupation:    Tobacco Use    Smoking status: Former     Current packs/day: 0.00     Average packs/day: 0.1 packs/day for 8.0 years (0.8 ttl pk-yrs)     Types: Cigarettes     Start date: 2000     Quit date: 2008     Years since quittin.5    Smokeless tobacco: Never   Vaping Use    Vaping status: Never Used   Substance and Sexual Activity    Alcohol use: Yes     Comment: occasional    Drug use: No    Sexual activity: Yes     Partners: Male     Birth control/protection: None     Comment: NFP   Other Topics Concern    Parent/sibling w/ CABG, MI or angioplasty before 65F 55M? No   Social History Narrative    Not on file     Social Drivers of Health     Financial Resource Strain: Low Risk  (2025)    Financial Resource Strain     Within the past 12 months, have you or your family members you live with been unable to get utilities (heat, electricity) when it was really needed?: No   Food Insecurity: Low Risk  (2025)    Food Insecurity     Within the past 12 months, did you worry that your food would run out before you got money to buy more?: No     Within the past 12 months, did the food you bought just not last and you didn t have money to get more?: No   Transportation Needs: Low Risk  (2025)    Transportation Needs     Within the past 12 months, has lack of transportation kept you from medical appointments, getting your medicines, non-medical meetings or appointments, work, or from getting things  that you need?: No   Physical Activity: Insufficiently Active (1/6/2025)    Exercise Vital Sign     Days of Exercise per Week: 2 days     Minutes of Exercise per Session: 20 min   Stress: No Stress Concern Present (1/6/2025)    [employer] - Occupational Stress Questionnaire     Feeling of Stress : Only a little   Social Connections: Unknown (1/6/2025)    Social Connection and Isolation Panel [NHANES]     Frequency of Communication with Friends and Family: Not on file     Frequency of Social Gatherings with Friends and Family: Once a week     Attends Cheondoism Services: Not on file     Active Member of Clubs or Organizations: Not on file     Attends Club or Organization Meetings: Not on file     Marital Status: Not on file   Interpersonal Safety: Low Risk  (1/10/2025)    Interpersonal Safety     Do you feel physically and emotionally safe where you currently live?: Yes     Within the past 12 months, have you been hit, slapped, kicked or otherwise physically hurt by someone?: No     Within the past 12 months, have you been humiliated or emotionally abused in other ways by your partner or ex-partner?: No   Housing Stability: Low Risk  (1/6/2025)    Housing Stability     Do you have housing? : Yes     Are you worried about losing your housing?: No       Outpatient Encounter Medications as of 4/8/2025   Medication Sig Dispense Refill    atorvastatin (LIPITOR) 10 MG tablet Take 1 tablet (10 mg) by mouth daily. 90 tablet 3    benzonatate (TESSALON) 200 MG capsule Take 1 capsule (200 mg) by mouth 3 times daily as needed for cough. 60 capsule 0    Calcium Citrate-Vitamin D (CALCIUM CITRATE +D PO) Take 500 mg by mouth daily      Cholecalciferol (VITAMIN D) 2000 UNITS tablet Take 1 tablet by mouth daily      ferrous sulfate (IRON) 325 (65 FE) MG tablet Take 1 tablet (325 mg) by mouth 2 times daily 60 tablet 2    Multiple Vitamins-Minerals (MULTIVITAMIN PO)       mupirocin (BACTROBAN) 2 % external  ointment Apply topically 3 times daily (Patient not taking: Reported on 2/20/2025) 30 g 0    [START ON 4/10/2025] tirzepatide (MOUNJARO) 12.5 MG/0.5ML SOAJ auto-injector pen Inject 0.5 mLs (12.5 mg) subcutaneously once a week for 28 days. 2 mL 0    [START ON 5/8/2025] tirzepatide (MOUNJARO) 15 MG/0.5ML SOAJ auto-injector pen Inject 0.5 mLs (15 mg) subcutaneously every 7 days. 6 mL 2    VITAMIN B-12 PO 1 TABLET ORALLY DAILY(HOME MED)       No facility-administered encounter medications on file as of 4/8/2025.             O:   NAD, WDWN, Alert & Oriented, Mood & Affect wnl, Vitals stable   Here today alone   There were no vitals taken for this visit.   General appearance normal   Vitals stable   Alert, oriented and in no acute distress       Small white papule on upper eyelid near eyelid margin   Skin colored small subcutaneous nodule on left thumb   Stuck on papules and brown macules on trunk and ext   Red papules on trunk  Brown papules and macules with regular pigment network and borders on torso and extremities      The remainder of skin exam is normal       Eyes: Conjunctivae/lids:Normal     ENT: Lips, mucosa: normal    MSK:Normal    Cardiovascular: peripheral edema none    Pulm: Breathing Normal    Neuro/Psych: Orientation:Alert and Orientedx3 ; Mood/Affect:normal     A/P:  1. Seborrheic keratosis, lentigo, angioma, benign nevi, epidermal cyst on left thumb, milia on eyelid margin  It was a pleasure speaking to Israel Zabala today.  Discussed cyst excision and milia removal with Dr. Valdez.   BENIGN LESIONS DISCUSSED WITH PATIENT:  I discussed the specifics of tumor, prognosis, and genetics of benign lesions.  I explained that treatment of these lesions would be purely cosmetic and not medically neccessary.  I discussed with patient different removal options including excision, cautery and /or laser.      Nature and genetics of benign skin lesions dicussed with patient.  Signs and Symptoms of skin cancer  discussed with patient.  ABCDEs of melanoma reviewed with patient.  Patient encouraged to perform monthly skin exams.  UV precautions reviewed with patient.  Risks of non-melanoma skin cancer discussed with patient   Return to clinic in two years or sooner if needed.

## 2025-04-13 ENCOUNTER — HEALTH MAINTENANCE LETTER (OUTPATIENT)
Age: 45
End: 2025-04-13

## 2025-05-29 ENCOUNTER — VIRTUAL VISIT (OUTPATIENT)
Dept: SURGERY | Facility: CLINIC | Age: 45
End: 2025-05-29
Payer: COMMERCIAL

## 2025-05-29 ENCOUNTER — MYC MEDICAL ADVICE (OUTPATIENT)
Dept: FAMILY MEDICINE | Facility: CLINIC | Age: 45
End: 2025-05-29

## 2025-05-29 VITALS — WEIGHT: 252 LBS | BODY MASS INDEX: 46.09 KG/M2

## 2025-05-29 DIAGNOSIS — Z98.84 WEIGHT GAIN FOLLOWING GASTRIC BYPASS SURGERY: ICD-10-CM

## 2025-05-29 DIAGNOSIS — E66.813 CLASS 3 SEVERE OBESITY DUE TO EXCESS CALORIES WITH SERIOUS COMORBIDITY AND BODY MASS INDEX (BMI) OF 45.0 TO 49.9 IN ADULT (H): Primary | ICD-10-CM

## 2025-05-29 DIAGNOSIS — Z98.84 HX OF GASTRIC BYPASS: ICD-10-CM

## 2025-05-29 DIAGNOSIS — R73.03 BORDERLINE DIABETES: ICD-10-CM

## 2025-05-29 DIAGNOSIS — R63.5 WEIGHT GAIN FOLLOWING GASTRIC BYPASS SURGERY: ICD-10-CM

## 2025-05-29 DIAGNOSIS — K91.2 POSTOPERATIVE MALABSORPTION: ICD-10-CM

## 2025-05-29 ASSESSMENT — PAIN SCALES - GENERAL: PAINLEVEL_OUTOF10: NO PAIN (0)

## 2025-05-29 NOTE — NURSING NOTE
Is the patient currently in the state of [place]? Y    Location: work    Visit mode:VIDEO    If the visit is dropped, the patient can be reconnected by: VIDEO VISIT: Text to cell phone:   Telephone Information:   Mobile [phone]    and VIDEO VISIT: Send to e-mail at: [email or web address]    Will anyone else be joining the visit? NO  (If patient encounters technical issues they should call [phone])    Are changes needed to the allergy or medication list? No    Are refills needed on medications prescribed by this physician? NO    Reason for visit: ODILON Randolph, Virtual Visit Facilitator    QNR Status: NA

## 2025-05-29 NOTE — PATIENT INSTRUCTIONS
Plan:  If my NPI taxonomy issues aren't sorted out yet (the system may not have updated yet), you can get Mounjaro 15mg/week refilled through your PCP. I'm not sure how much lag time is built into the system for approval.     2. Continue mindful bariatric methods: within 2 hours of waking, have your first meal. Meals should start with protein first, about 20 grams/meal and supplement as necessary with shakes/yogurt or other protein sources to hit your 70-90 grams/day protein goal. Hydrate well between meals and dose a meal every 4-5 hours to stay on track with keeping appetite controlled/body well nourished after gastric bypass.    3. Diabetes much improved, continue Mounjaro with increase to 15mg/week if tolerated.If too strong, you can maintain at the 12.5mg/week dose.     4. Try to avoid distracted eating at desk/car/in front of screens.     5. Aim for 3-5 sessions per week of some fitness or active hobby that gets your heart rate over 100 beats/minute for at least 12 minutes.         Zepbound/Mounjaro (Tirzepatide) is a very effective satiety boosting appetite suppressant that elevates satiety hormones GLP1 and GIP. It needs to be ramped up slowly to be tolerated adequately.  It helps release insulin in response to food when blood sugar runs higher than normal and is very helpful for diabetics in managing blood sugar levels with low risks for any low blood sugars.  About 1/10 people will not tolerate this medication. Each month, you move up to a higher dose until eventually reaching the 10mg/week dose if tolerated with further ramping to follow if needed. If intolerant or severe side effects, a dose decrease would be wise, so keep me posted if not tolerated the ramping well. This may be a longer term medication based on individual needs/physiology and appetite control.     Injections can be given after cleansing the skin with alcohol prep pad or swab (available OTC).     Stop Zepbound if severe abdominal  pain/vomiting/rash/throat swelling or constant nausea that prevents adequate food/water intake. Stop 2-3 weeks prior to any planned general anesthesia surgeries to reduce risk for something called a post operative ileus.     Gallstones can occur in about 1% of patients on this medication so update me if increase right upper abdominal pain after eating.     Start meals with protein first, separate beverages from meals by 20 minutes and work hard in between meals to get your 64-75 oz of water daily to reduce risks for severe constipation. Consider a fiber supplement like powdered psyllium husk in 12 oz water each night, stool softerners as needed and Miralax or milk of Magnesia if more than 3 days have passed without a Bowel Movement. Some other options include:  For Prevention and Treatment of Constipation when on Semaglutide     From least aggressive to most aggressive:     Move: Wallking is essential - the more we move, the more our bowels move  Water: Drink water - 64oz or more a day  Go when you need to go. Don't wait. The longer you wait, the harder it gets.  Fiber: Fruit, raw veggies, nuts, whole grains, prune each night, flax/aden seeds added into meals can all be helpful.   Stool Softeners: if constipation is mild and for maintenance  Gentle laxatives: Miralax, senokot, dulcolax , Smooth move tea as needed     More aggressive (and typically won't get to this point)  Milk of Magnesia  Mag Citrate (what you drink before a colonoscopy)  Suppositories  Enema      Check out [employer] for patient resources.  If you have weekends off, I recommend dosing Friday evenings.     Some people starve on this medication if not mindful about food intake. I recommend starting meals with the protein part of your meal first, chew thoroughly and separate beverages from meals by about 20 minutes to make sure you get your nourishment in first. Include vegetables/complex carbohydrates and unsaturated fat as part of your balanced  diet but group these at the end of the meal, after your protein is mostly gone. Satiety will kick in too early if drinking too much with meals and under-nourishment can result.     It's not a bad idea to take a complete multivitamin most days of the week if using this medication. Lower iron content tends to be less constipating.     Adequate hydration is essential for feeling your best, efficient fat burning, waste elimination and constipation prevention. For those without fluid restrictions due to other disease, the goal is at least one ounce of water per gram of protein consumed with a  minimum of 64oz/day goal.     Pancreatitis is a very rare but potentially serious side effect. Stop Zepbound if severe mid abdominal pain/burning in nature or if unable to eat/drink due to severe nausea/discomfort.   People with strong history of pancreatitis without clear cause should stay clear of this medication as should those planning to get pregnant, those with strong personal or family history for medullary thyroid cancer or Multiple Endocrine Neoplasia (rare).     Stop Zepbound at least 2 weeks prior to any planned surgery.  Stop until fully recovered if unexpected/emergent surgery is needed with anticipation that re-ramping will be needed if off longer than 14-16 days since last dose.    Kind Regards,  Shon Chand MD  [facility]    LEAN PROTEIN SOURCES  Getting 20-30 grams of protein, 3 meals daily, is appropriate for most people, some need more but more than about 40 grams per meal is not useful.  General rule is drinking one ounce of water per gram of protein eaten over the course of the day:  70 grams of protein each day, drink 70 oz of water.  Protein Source Portion Calories Grams of Protein                           Nonfat, plain Greek yogurt    (10 grams sugar or less) 3/4 cup (6 oz)  12-17   Light Yogurt (10 grams sugar or less) 3/4 cup (6 oz)  6-8   Protein  Shake 1 shake 110-180 15-30   Skim/1% Milk or lactose-free milk 1 cup ( 8 oz)  8   Plain or light, flavored soymilk 1 cup  7-8   Plain or light, hemp milk 1 cup 110 6   Fat Free or 1% Cottage Cheese 1/2 cup 90 15   Part skim ricotta cheese 1/2 cup 100 14   Part skim or reduced fat cheese slices 1 ounce 65-80 8     Mozzarella String Cheese 1 80 8   Canned tuna, chicken, crab or salmon  (canned in water)  1/2 cup 100 15-20   White fish (broiled, grilled, baked) 3 ounces 100 21   [place]/Tuna (broiled, grilled, baked) 3 ounces 150-180 21   Shrimp, Scallops, Lobster, Crab 3 ounces 100 21   Pork loin, Pork Tenderloin 3 ounces 150 21   Boneless, skinless chicken /turkey breast                          (broiled, grilled, baked) 3 ounces 120 21   [place], [place], [place], and Venison 3 ounces 120 21   Lean cuts of red meat and pork (sirloin,   round, tenderloin, flank, ground 93%-96%) 3 ounces 170 21   Lean or Extra Lean Ground Turkey 1/2 cup 150 20   90-95% Lean [place] Burger 1 chong 140-180 21   Low-fat casserole with lean meat 3/4 cup 200 17   Luncheon Meats                                                        (turkey, lean ham, roast beef, chicken) 3 ounces 100 21   Egg (boiled, poached, scrambled) 1 Egg 60 7   Egg Substitute 1/2 cup 70 10   Nuts (limit to 1 serving per day)  3 Tbsp. 150 7   Nut [place] (peanut, almond)  Limit to 1 serving or less daily 1 Tbsp. 90 4   Soy Burger (varies) 1  15   Garbanzo, Black, [place] Beans 1/2 cup 110 7   Refried Beans 1/2 cup 100 7   Kidney and Lima beans 1/2 cup 110 7   Tempeh 3 oz 175 18   Vegan crumbles 1/2 cup 100 14   Tofu 1/2 cup 110 14   Chili (beans and extra lean beef or turkey) 1 cup 200 23   Lentil Stew/Soup 1 cup 150 12   Black Bean Soup 1 cup 175 12       On-the-Go Breakfast Ideas  As of 2015, the latest research shows what a huge impact eating breakfast has on losing weight and feeling your best. People lose more weight when they make breakfast their  biggest meal of the day compared to Dinner, but even if you cannot go to that degree, getting a breakfast that has at least 20 grams of protein and even a moderate amount of fat is ideal for maintaining good energy through the day and limits overeating in the evening hours.  The following are some quick and easy suggestions for at least getting something of substance into your body in the morning.  Enjoy!    Eating breakfast within 90 minutes of waking up is an important part of taking care of your body on a restricted calorie diet plan.  After sleeping for hours, your body is in need of fuel.  An ideal breakfast is a combination of protein, whole-grain carbohydrates, or fruit.  Here s why:    -Protein digests very slowly in the body, helping you feel more satisfied.  -Whole grains provide dietary fiber, which also digests slowly and helps keep your gut clean.  -Fruit is a great source of vitamins, minerals, and fiber.     Each one of these breakfast combinations has between 200-300 calories and 15-20 grams of protein.  Feel free to mix and match!    Bone Broth (chicken bone broth or beef bone broth) is a great way to boost protein content. 8oz of bone broth will typically have 9-12grams of protein for 40kcal of energy.    Protein: Choose  -1/2 cup low-fat cottage cheese  -2 hard boiled eggs , or one cooked in olive oil (low/slow heat).  -1 low fat string cheese stick  -1 Tablespoon natural peanut butter  -Bellabox vegetarian sausage chong (found in freezer section)  -1 slice lowfat cheese  -6 oz 2% or lowfat Greek yogurt, such as Fage or Oikos.    PLUS    Whole Grains:  Choose   -1 whole wheat English muffin  -1 whole wheat elizabet, half  -1/2 Fiber One frozen muffin, thawed  -1/2 Fiber One toaster pastry  -1 whole wheat bagel thin  -1/2 cup Kashi cereal  -1 Kashi waffle (or other whole grain high-fiber waffle)  Aim for whole grain/sprouted breads with at least 3g of fiber/slice if having bread. Trigeminas  is one such brand.    OR    Fruit: Choose  -1/3 cup blueberries  -1/2 banana (or a plantain- similar to a banana, yet smaller)  -1/2 cup cantaloupe cubes  -1 small apple  -1 small orange  -1/2 cup strawberries  -handful raspberries/blackberries (each berry is about 1 calorie).    *Adapted from [facility], Fall 20    Ten Breakfasts Under 250 calories    Ideally, getting between 350-600 calories  (depending on starting height and weight)for breakfast is ideal for avoiding hunger later in the day, adjust/add to the following accordingly:    One- 250 calories, 8.5 g protein  1 slice whole-grain toast   1 Tbsp peanut butter    banana    Two- 250 calories, 8 g protein    cup nonfat/lowfat yogurt  1/3rd cup diced no-sugar peaches  1/3rd cup cereal (like Special K, Cheerios, or bran flakes)    Three- 250 calories, 25 g protein  1 egg scrambled with 1 oz skim milk    cup shredded cheddar    whole grain English muffin  1 oz [place] harmon  1 tsp margarine spread    Four- 225 calories, 25 g protein  1/2 cup Kashi Go-Lean cereal    cup skim milk mixed with 1 scoop Bariatric Advantage protein powder    cup no-sugar diced pears    Five- 250 calories, 20 g protein    cup oatmeal prepared with skim milk, 1 scoop protein powder, and sugar-free maple syrup    Six- 200 calories, 5 g protein  1 whole grain waffle, toasted  1 tablespoon creamy peanut or almond butter    Seven-  250 calories, 19 g protein  Breakfast sandwich: 1 slice whole grain toast, cut in half.  Add 1 scrambled egg and one slice cheddar  cheese.    Eight-  250 calories, 15 g protein  2 eggs scrambled with 1/3 cup frozen spinach (heat before adding to eggs) and 2 tablespoons low fat cream cheese.    Nine-  150 calories, 15 g protein  2/3rd cup cottage cheese    cup cantaloupe    Ten- 200 calories, 20 g protein  Fruit smoothie made with 4 oz. nonfat Greek yogurt,   cup berries, 1 scoop protein powder, and 4 oz skim milk.    Ten Lunches Under 250 Calories    Aim  for lunch to be around 300-400 calories a day when trying to lose weight and get that protein in!    One- 200 calories, 11 g protein  1/3 cup tuna salad made with light frank on 1 slice whole grain bread  1 small peeled apple    Two- 250 calories, 16 g protein  1/3 cup lowfat cottage cheese    cup cooked green beans    small fruit cocktail (in natural juice)    Three- 200 calories, 11 g protein    grilled cheese sandwich on whole grain bread with lowfat cheese  2/3rd cup of tomato soup    Four- 250 calories, 22 g protein  Deli wrap: 1 oz sliced turkey, 1 oz sliced ham, 1 oz sliced chicken rolled up with 1 slice low-fat cheese  1 small orange    Five- 250 calories, 28 g protein  2/3rd cup chili with 1 oz shredded cheese  4 saltine crackers    Six- 250 calories, 22 g protein  1 cup fresh spinach with 2 oz chicken, 1/3rd cup mandarin oranges, and 2 tablespoons sliced almonds with 1 tablespoon  vinaigrette dressing    Seven- 200 calories, 11 g protein  1 Tbsp sugar-free preserves and 1 Tbsp peanut butter on 1 slice whole grain toast    cup nonfat/lowfat Greek yogurt    Eight- 250 calories, 18 g protein  1 small soft-shell chicken taco with 1 oz shredded cheese, lettuce, tomato, salsa, and 1 Tbsp light sour cream    cup black beans    Nine- 225 calories, 13 g protein  2 ounces baked chicken  1/4 cup mashed potatoes    cup green beans    Ten- 200 calories, 21 g protein  Deli eilzabet: 2 oz roast beef or other deli meat with 1 tsp Richy mayonnaise and sliced tomato, onion, and lettuce  1/3rd cup cottage cheese      Ten Dinners Under 300 calories    If you're eating a large breakfast and medium lunch, keep dinner small.  300-400 calories is ideal for most people depending on their caloric needs.    One- 300 calories, 12 g protein  1-inch thick slice of turkey meatloaf    cup baked butternut squash    Two- 200 calories, 9 g protein  Bread-less BLT: 3 slices turkey harmon, sliced tomato, wrapped in a large lettuce leaf    cup  peeled fruit    Three- 275 calories, 36 g protein  3 oz roasted chicken    cup cooked broccoli    cup shredded cheddar cheese    cup unsweetened applesauce    Four- 200 calories, 25 g protein  3 oz baked tilapia  1/3rd cup cooked carrots    cup yogurt    Five- 250 calories, 20 g protein  Grilled ham  n  Swiss: spread 2 tsp ghee or butter on 1 slice of whole grain bread.  Cut bread in half, layer 2 oz deli ham with 1 piece of Swiss cheese and grill until cheese is melted.    cup cooked vegetables    Six- 250 calories, 18 g protein  Vegetarian cheeseburger: 1 [place] cheeseburger topped with lettuce, onion, tomato, and ketchup/mustard    cup sweet potato fries    Seven- 250 calories, 18 g protein  Pork pot roast: 2 oz roasted pork loin, 1/3rd cup roasted carrots,   medium potato, cooked with   cup gravy    Eight- 330 calories, 25 g protein  2 oz meatballs (about 2 small meatballs)    cup spaghetti sauce  1/2 piece toast topped with 1 tsp ghee or butterand topped with garlic powder, toasted in oven    Nine- 250 calories, 16 g protein  Mexican pizza: one 8  corn tortilla topped with 2 oz chicken,   cup salsa, 2 tablespoons black beans, 2 tablespoons shredded cheese.  Bake until cheese is melted.    Ten- 250 calories, 22 g protein  Shrimp stir-taveras: 3 oz cooked shrimp, 1/6th onion,   pepper,   cup chopped carrots sautéed in 1 tablespoon olive oil, topped with 2 tablespoons stir taveras sauce and a pinch of sesame seeds        150 Calories or Less Snack Ideas   1 hardboiled egg with   cup berries  1 small apple with 1 hardboiled egg  10 almonds with   cup berries  2 clementines with 1 light string cheese  1 light string cheese with   sliced apple  1 light string cheese wrapped in 2 slices of turkey  4 100% whole wheat crackers (e.g. Triscuit) with 1 light string cheese    c. cottage cheese with   cup fruit and 1 Tbsp sunflower seeds     cup cottage cheese with   of an avocado     can tuna fish with 1 cup sliced cucumbers     cup  roasted garbanzo beans with paprika and cayenne pepper    baked sweet potato with   cup chili beans or   cup cottage cheese  2 oz. nitrate free turkey slices with 1 cup carrots  1 container (6 oz) of low sugar (less than 10 grams of sugar) greek yogurt   3 Tablespoons of hummus with 1 cup sliced bell peppers   2 Tablespoons of hummus with 15 baby carrots  4 Tablespoons ranch dip made with plain Greek Yogurt and 3 mini cucumbers  1/4 cup nuts (any kind)  1 Tablespoon peanut butter with 1 stalk celery   1 dill pickle wrapped in 1-2 slices of deli ham with 1 tsp of mayonnaise/mustard.  [facility]  Nutritional Guidelines  Gastric Bypass 18 Months Post Op and Beyond    General Guidelines and Helpful Hints:  Eat 3 meals per day + protein supplement(s). No snacks between meals.  Do not skip meals.  This can cause overeating at the next meal and will prevent adequate protein and nutritional intake.  Aim for 60-80 grams of protein per day.  Always eat your protein first. This assists with optimal nutrition and helps you stay full longer.  Depending on your portion size, you may need to drink approved protein supplement between meals to achieve protein goals. Follow recommendations of your Dietitian.   Eat your protein first, and then follow with fiber.   It is not necessary to count your fiber, but 15-20 grams per day is recommended.    Add fiber by including fruits, vegetables, whole grains, and beans.   Portions should remain about 1 cup per meal. Use measuring cups to be accurate.  Continue to use saucer/salad plates, infant/toddler silverware to keep portion sizes small and take small bites.  Eat S-L-O-W-L-Y to make each meal last 20-30 minutes. Always stop eating when satisfied.  Continue to use caution with foods containing skins, peels or membranes. Chew well!  Aim for 64 oz. of calorie-free fluids daily.  Continue to avoid caffeine and carbonation. If you choose to drink alcohol, do so in moderation.  "  Remember to avoid drinking during meals, 15-30 minutes before and 30 minutes after.  Exercise is gonzalez for continued weight loss and weight maintenance. 150 minutes weekly of moderate aerobic activity or 75 minutes of vigorous with 2 days or more a week of strength training. Try to get 20% or more of your steps each day at a brisk pace, as though hurrying to a bus stop. Look to get stronger this year.  If having trouble tolerating meat, try using a crock-pot, tinfoil tent, steamer or other moist cooking method to create tender meats. Add broth or low-fat gravy to help meat stay moist.   Avoid high sugar and high fat foods to prevent dumping syndrome.  Check nutrition labels for less than 10 grams of sugar and less than 10 grams of fat per serving.  Continue Taking Vitamins/Minerals:  1000 mcg of Sublingual B-12 at least 3 days weekly to average 350-500mcg/day. If using 2500mcg lozenges, 2 weekly.  If 5000 mcg, once weekly dosing works.  1 Complete Multivitamin with 18mg Iron twice daily (chewable or swallow tabs). Often sold as \"women's one a day\" if tablet but take twice daily.  500-600 mg Calcium Citrate twice daily (chewable or swallow tabs).  5000 IU Vitamin D3 daily.  If menstruating, you may need closer to 60mg of iron daily to prevent iron deficiency. An occasional \"boost\" of extra iron supplement during/after is reasonable if heavy flow.    Sample Grocery List    Protein:  Fat free Greek or light yogurt (less than 10 grams sugar)  Fat free or low-fat cottage cheese  String cheese or reduced fat cheese slices  Tuna, salmon, crab, egg, or chicken salad made with light or fat free mayonnaise  Egg or Egg Substitute  Lean/extra lean turkey, beef, bison, venison (ground, sirloin, round, flank)  Pork loin or tenderloin (grilled, baked, broiled)  Fish such as salmon, tuna, trout, tilapia, etc. (grilled, baked, broiled)  Tender cuts of lean (skinless) turkey or chicken  Lean deli meats: turkey, lean ham, chicken, " lean roast beef  Beans such as kidney, garbanzo, black, landeros, or low-fat/fat free refried beans  Peanut butter (natural preferred). Limit to 1 Tbsp. per day.  Low-fat meatloaf (made with lean ground beef or turkey)  Sloppy Joes made with low-sugar ketchup and lean ground beef or turkey  Soy or vegetable protein (i.e. vegan crumbles, soy/veggie burger, tofu)  Hummus    Vegetables:  Fresh: cooked or raw (as tolerated)  Frozen vegetables  Canned vegetables (low sodium or no salt added, rinse before cooking/eating)  (Ok to have skins/peels/membranes/seeds - just chew well)    Fruits:  Fresh fruit  Frozen fruit (no sugar added)  Canned fruit (packed in its own juice, NOT syrup)  (Ok to have skins/peels/membranes/seeds - just chew well)    Starch:  Unsweetened whole-grain hot cereal (or high fiber cold cereal, dry)  Toasted whole wheat bread or [place] Thins  Whole grain crackers  Baked /boiled/mashed potato/sweet potato  Cooked whole grain pasta, brown rice, or other cooked whole grains  Starchy vegetables: corn, peas, winter squash    Protein Supplement:   Ready to drink protein shake with:  15-30 grams protein per serving  Less than 10 grams total carbohydrate per serving   Protein powder mixed with:   Skim or 1% milk  Low fat or fat free Lactaid milk, plain or no sugar added soymilk  Water     Fats: (use in moderation)  1 teaspoon of soft tub margarine  1 teaspoon olive oil, canola oil, or peanut oil  1 tablespoon of low-fat frank or salad dressing     Sample Menu for 18+ months after Gastric Bypass    You do NOT need to eat/drink the full portion sizes listed below  Always stop when you are satisfied    Breakfast   cup 1% cottage cheese     cup mixed berries   Lunch 2 oz lean roast beef on   [place] Thin with 1 tsp. light frank    small tomato, chopped, mixed with 1 tsp. light vinaigrette dressing   Supplement Approved protein supplement (if needed between meals)   Dinner 2 oz grilled salmon    cup salad greens with  1 tsp. light salad dressing and 1 tsp. ground flax seed    cup quinoa or brown rice     Breakfast   cup egg substitute with   cup sautéed chopped vegetables  2 light [place] Krisp crackers   Lunch Tuna Melt:   cup tuna mixed with 1 tsp. light frank over   [place] Thin. Top with 2-3 slices cucumber and 1 oz slice of low fat cheese   Supplement 1 cup skim milk (if needed between meals)   Dinner 3 oz  grilled, broiled, or baked seasoned skinless chicken breast    cup asparagus     Breakfast   cup plain oatmeal made with skim or 1% milk with 1 Tbsp. flavored/unflavored protein powder added  1 mozzarella string cheese   Lunch 2 oz deli turkey breast  1/3 cup salad with 1 tsp. light salad dressing, 1/8 of a whole avocado and 1 Tbsp. sunflower seeds   Dinner 3 oz. pork loin made in a crock pot, seasoned with a spice rub    cup cooked carrots   Supplement Approved protein supplement (if needed between meals)     Breakfast 1 cup breakfast casserole made with egg substitute, turkey sausage,  and steamed, chopped bell peppers   Supplement  1 cup light Greek yogurt (if needed between meals)   Lunch 2 oz. teriyaki turkey    cup mashed sweet potato with 1-2 spritzes of spray butter    cup fresh pineapple   Dinner 3 oz low fat meatloaf    cup roasted garlic zucchini     Breakfast   cup leftover breakfast casserole    cup no sugar added applesauce with 1 Tbsp. unflavored protein powder and a sprinkle of cinnamon    Lunch 3 oz shrimp with 1-2 Tbsp. low-sugar cocktail sauce for dipping    c. whole wheat pasta drizzled with   tsp. olive oil   Supplement 1 cup skim/1% milk with scoop of protein powder (if needed between meals)   Dinner Grilled, seasoned kebob with 2 oz lean beef and   cup vegetables     Breakfast Breakfast pizza:   [place] Thin spread with 1 Tbsp. low sugar spaghetti sauce,   cup shredded low fat cheese, melted and 1 slice of [place] harmon     cup fresh fruit mixed with chopped almonds   Lunch   cup black bean soup  4-5  whole grain crackers   Dinner 3 oz  tilapia with lemon pepper seasoning    cup stewed tomatoes   Supplement 1 string cheese (if needed between meals)     Breakfast 2 hard boiled eggs (discard 1 egg yolk)    whole wheat English Muffin with 1 tsp. low sugar jelly   Lunch   cup leftover black bean soup topped with 1-2 Tbsp. low fat cheese  2-3 light [place] Krisp crackers   Supplement Approved protein supplement (if needed between meals)   Dinner 3 oz sirloin steak    cup steamed broccoli

## 2025-05-29 NOTE — TELEPHONE ENCOUNTER
"Gen Surg Note today: \"If my NPI taxonomy issues aren't sorted out yet (the system may not have updated yet), you can get Mounjaro 15mg/week refilled through your PCP. I'm not sure how much lag time is built into the system for approval. \"    Spoke with pharmacy - provider NPI taxonomy did not allow to send pharmacy was unsure of what the issue is. They are unable to send through with error.    Pended for consideration.  Please advise.  Maryam Hodges RN on 5/29/2025 at 3:31 PM    "

## 2025-05-29 NOTE — PROGRESS NOTES
Virtual Visit Details    Type of service:  Video Visit     Originating Location (pt. Location): Home    Distant Location (provider location):  On-site  Platform used for Video Visit: Dio  Bariatric Follow Up Visit with a History of Previous Bariatric Surgery     Date of visit: 5/29/2025  Physician: Shon Chand MD, MD  Primary Care Provider:  Miriam Ghotra   44 year old  female    Date of Surgery: 2003 in [facility] [facility] with Dr. Dey  Initial Weight: 284 lbs (max weight of 301 lbs)  Intake with our program in 2018 at 276 lbs  Borderline diabetes in the past at 6.6% A1c, started ozempic January of 2022 with improvements but with resistant Class III obesity, was transitioned to Mounjaro in August of 2023 after my appeal was granted by her insurance.  Today's Weight:   Wt Readings from Last 1 Encounters:   05/29/25 114.3 kg (252 lb)     Weight history:   Wt Readings from Last 4 Encounters:   05/29/25 114.3 kg (252 lb)   02/20/25 117 kg (258 lb)   01/10/25 117.5 kg (259 lb)   12/05/24 116.1 kg (256 lb)      Body mass index is 46.09 kg/m .      Assessment and Plan     Assessment: Israel is a 44 year old year old female who is about 22 years s/p  Dung en Y Gastric Bypass with Dr. Dey at [facility].  In the interim Mounjaro increased to 12.5mg/week in recent weeks and she finds it to be well tolerated but the 15mg/week dose didn't go through yet due to some NPI taxonomy issues that should now be sorted out .  She's had very resistant Class III obesity despite surgery/medication support but weight has been trending down in recent weeks. .    Bariatric labs 1/10/25 showed good support; mild excess vitamin D.  Her weight is down 49 lbs from max weight,  a 16.3% total body weight reduction. Less than average but enough to have important health benefits despite some resistant Class III, morbid obesity.    A1c much improved from 2022 when peaked at 6.6%, now down to 5.6% in good control.      Israel Zabala feels as if she has not fully achieved the goals she hoped to accomplish through bariatric surgery and weight loss.    Encounter Diagnoses   Name Primary?    Class 3 severe obesity due to excess calories with serious comorbidity and body mass index (BMI) of 45.0 to 49.9 in adult (H) Yes    Borderline diabetes     Hx of gastric bypass     Postoperative malabsorption     Weight gain following gastric bypass surgery          Current Outpatient Medications:     tirzepatide (MOUNJARO) 15 MG/0.5ML SOAJ auto-injector pen, Inject 0.5 mLs (15 mg) subcutaneously every 7 days., Disp: 6 mL, Rfl: 2    atorvastatin (LIPITOR) 10 MG tablet, Take 1 tablet (10 mg) by mouth daily., Disp: 90 tablet, Rfl: 3    Calcium Citrate-Vitamin D (CALCIUM CITRATE +D PO), Take 500 mg by mouth daily, Disp: , Rfl:     Cholecalciferol (VITAMIN D) 2000 UNITS tablet, Take 1 tablet by mouth daily, Disp: , Rfl:     ferrous sulfate (IRON) 325 (65 FE) MG tablet, Take 1 tablet (325 mg) by mouth 2 times daily, Disp: 60 tablet, Rfl: 2    Multiple Vitamins-Minerals (MULTIVITAMIN PO), , Disp: , Rfl:     mupirocin (BACTROBAN) 2 % external ointment, Apply topically 3 times daily (Patient not taking: Reported on 2/20/2025), Disp: 30 g, Rfl: 0    VITAMIN B-12 PO, 1 TABLET ORALLY DAILY(HOME MED), Disp: , Rfl:     Plan:  If my NPI taxonomy issues aren't sorted out yet (the system may not have updated yet), you can get Mounjaro 15mg/week refilled through your PCP. I'm not sure how much lag time is built into the system for approval.     2. Continue mindful bariatric methods: within 2 hours of waking, have your first meal. Meals should start with protein first, about 20 grams/meal and supplement as necessary with shakes/yogurt or other protein sources to hit your 70-90 grams/day protein goal. Hydrate well between meals and dose a meal every 4-5 hours to stay on track with keeping appetite controlled/body well nourished after gastric bypass.    3.  "Diabetes much improved, continue Mounjaro with increase to 15mg/week if tolerated.If too strong, you can maintain at the 12.5mg/week dose.     4. Try to avoid distracted eating at desk/car/in front of screens.     5. Aim for 3-5 sessions per week of some fitness or active hobby that gets your heart rate over 100 beats/minute for at least 12 minutes.       No follow-ups on file.    Bariatric Surgery Review     Interim History/LifeChanges: needs Mounjaro refill 15mg/week. Still losing weight slowly now, uptick from 12.5mg/week   12 and 15 yo daughters. Competitive dancers, frequently traveling to competitions.   Patient Concerns: slow weight loss  Appetite (1-10): improving  GERD: no.    Reviewed whether any need/indication for screening EGD today and we will deferred.  Typically, a screening EGD is recommend post op year 2-3 if no symptoms to assess health of esophagus/bariatric surgery and sooner if difficult to control GERD or persistent pain/dysphagia sx despite behavior modification.    Medication changes:     Vitamin Intake:   Continues regular vitamin use             LABS: \"Reviewed will recheck A1c/BMP at next visit. Vitamin labs in January '26.      [employer]   [id number] [employer] Version 1.1    Question 5/26/2025  1:13 PM CDT - Filed by Patient   In general, would you say your health is: Very good   In general, would you say your quality of life is: Very good   In general, how would you rate your physical health? Very good   In general, how would you rate your mental health, including your mood and your ability to think? Very good   In general, how would you rate your satisfaction with your social activities and relationships? Very good   In general, please rate how well you carry out your usual social activities and roles. (This includes activities at home, at work and in your community, and responsibilities as a parent, child, spouse, employee, friend, etc.) " "Very good   To what extent are you able to carry out your everyday physical activities such as walking, climbing stairs, carrying groceries, or moving a chair? Completely   In the past 7 days    How often have you been bothered by emotional problems such as feeling anxious, depressed or irritable? Never   How would you rate your fatigue on average? Mild   How would you rate your pain on average?   0 = No Pain  to  10 = Worst Imaginable Pain 2     [facility] Return Questionnaire    Question 5/26/2025  1:16 PM CDT - Filed by Patient   I have made the following changes to my diet since my last visit: Prioritizing protein.   With regards to my diet, I am still struggling with:    I have made the following changes to my activity/exercise since my last visit: Increasing walking outsude.   With regards to my activity/exercise, I am still struggling with:    Which weight loss medications are you currently taking on a regular basis?    If you are not taking a weight loss medication that was prescribed to you, please indicate why:    Are you having any side effects from the weight loss medication that we have prescribed you? No         Most recent labs:  Lab Results   Component Value Date    WBC 7.6 01/10/2025    HGB 14.9 01/10/2025    HCT 45.4 01/10/2025    MCV 99 01/10/2025     01/10/2025     Lab Results   Component Value Date    CHOL 167 01/10/2025     Lab Results   Component Value Date    HDL 68 01/10/2025     No components found for: \"LDLCALC\"  Lab Results   Component Value Date    TRIG 75 01/10/2025     No results found for: \"CHOLHDL\"  Lab Results   Component Value Date    ALT 34 01/10/2025    AST 30 01/10/2025    ALKPHOS 107 01/10/2025     No results found for: \"HGBA1C\"  Lab Results   Component Value Date    B12 1,109 01/10/2025     No components found for: \"VITDT1\"  Lab Results   Component Value Date    KETAN 108 01/10/2025     Lab Results   Component Value Date    PTHI 45 01/10/2025     Lab Results   Component " "Value Date    ZN 99.1 01/10/2025     Lab Results   Component Value Date    VIB1WB 141 2024     Lab Results   Component Value Date    TSH 1.98 2024     No results found for: \"TEST\"    Habits:  Walking some, 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     Social History     Social History     Socioeconomic History    Marital status:      Spouse name: Not on file    Number of children: 1    Years of education: Not on file    Highest education level: Not on file   Occupational History    Occupation:    Tobacco Use    Smoking status: Former     Current packs/day: 0.00     Average packs/day: 0.1 packs/day for 8.0 years (0.8 ttl pk-yrs)     Types: Cigarettes     Start date: 2000     Quit date: 2008     Years since quittin.6    Smokeless tobacco: Never   Vaping Use    Vaping status: Never Used   Substance and Sexual Activity    Alcohol use: Yes     Comment: occasional    Drug use: No    Sexual activity: Yes     Partners: Male     Birth control/protection: None     Comment: NFP   Other Topics Concern    Parent/sibling w/ CABG, MI or angioplasty before 65F 55M? No   Social History Narrative    Not on file     Social Drivers of Health     Financial Resource Strain: Low Risk  (2025)    Financial Resource Strain     Within the past 12 months, have you or your family members you live with been unable to get utilities (heat, electricity) when it was really needed?: No   Food Insecurity: Low Risk  (2025)    Food Insecurity     Within the past 12 months, did you worry that your food would run out before you got money to buy more?: No     Within the past 12 months, did the food you bought just not last and you didn t have money to get more?: No   Transportation Needs: Low Risk  (2025)    Transportation Needs     Within the past 12 months, has lack of transportation kept you from medical appointments, getting your medicines, non-medical meetings or appointments, work, or from getting things that " you need?: No   Physical Activity: Insufficiently Active (1/6/2025)    Exercise Vital Sign     Days of Exercise per Week: 2 days     Minutes of Exercise per Session: 20 min   Stress: No Stress Concern Present (1/6/2025)    [employer] - Occupational Stress Questionnaire     Feeling of Stress : Only a little   Social Connections: Unknown (1/6/2025)    Social Connection and Isolation Panel [NHANES]     Frequency of Communication with Friends and Family: Not on file     Frequency of Social Gatherings with Friends and Family: Once a week     Attends Baptism Services: Not on file     Active Member of Clubs or Organizations: Not on file     Attends Club or Organization Meetings: Not on file     Marital Status: Not on file   Interpersonal Safety: Low Risk  (1/10/2025)    Interpersonal Safety     Do you feel physically and emotionally safe where you currently live?: Yes     Within the past 12 months, have you been hit, slapped, kicked or otherwise physically hurt by someone?: No     Within the past 12 months, have you been humiliated or emotionally abused in other ways by your partner or ex-partner?: No   Housing Stability: Low Risk  (1/6/2025)    Housing Stability     Do you have housing? : Yes     Are you worried about losing your housing?: No       Past Medical History     Past Medical History:   Diagnosis Date    Abnormal glandular Papanicolaou smear of cervix 2002    Negative colpo per patient    Cancer (H) 11/7/2013    focally positive for metastatic carcinoid tumor- [facility]    Chickenpox     Chronic maxillary sinusitis 03/31/2006    CTS (carpal tunnel syndrome) 03/06/2014    Situational anxiety 12/13/2012    Related to her cancer diagnosis     Past Surgical History:   Procedure Laterality Date    BRONCHOSCOPY FLEXIBLE  11/01/2013    related to carcinoid;  Flexible Bronchoscopy, Endobronchial Flexible Ultrasound;  Surgeon: Chris Sal MD;  Location:  OR     CHOLECYSTECTOMY      ENDOBRONCHIAL ULTRASOUND FLEXIBLE  11/01/2013    Procedure: ENDOBRONCHIAL ULTRASOUND FLEXIBLE;;  Surgeon: Chris Sal MD;  Location: [facility] OR    GASTRIC BYPASS  2003    HERNIA REPAIR      2011, supraumbilical    HERNIORRHAPHY INCISIONAL (LOCATION)  08/19/2011    Procedure:HERNIORRHAPHY INCISIONAL (LOCATION); Incisional Hernia Repair upper abdomen midline      LUNG LOBECTOMY      for carcinoid, done at [facility]    REVISION DUNG-EN-Y      TONSILLECTOMY  age 7    ZZC REMOVAL OF LUNG,BILOBECTOMY Right 11/22/2013    Carcinoid: Right Middle and Lower Lobe       Problem List     Patient Active Problem List   Diagnosis    CARDIOVASCULAR SCREENING; LDL GOAL LESS THAN 160    Hx of gastric bypass    Excessive bleeding in premenopausal period    White coat syndrome without diagnosis of hypertension    Nipple discharge in female    Postoperative malabsorption    Secondary hyperparathyroidism, non-renal    Hyperlipidemia LDL goal <70    Prediabetes     Medications     [unfilled]  Surgical History     Past Surgical History  She has a past surgical history that includes gastric bypass (2003); tonsillectomy (age 7); Herniorrhaphy incisional (location) (08/19/2011); REMOVAL OF LUNG,BILOBECTOMY (Right, 11/22/2013); Bronchoscopy flexible (11/01/2013); Endobronchial ultrasound flexible (11/01/2013); Revision Dung-En-Y; hernia repair; Cholecystectomy; and Lung Lobectomy.    Objective-Exam     Constitutional:  Wt 114.3 kg (252 lb)   BMI 46.09 kg/m    [unfilled]   General:  Pleasant and in no acute distress   Eyes:  EOMI  ENT:  Airway normal facies.    Neck:  Respiratory: Normal respiratory effort, no cough, .  CV:    Gastrointestinal:   Musculoskeletal: muscle mass WNL  Skin: color fair, slightly tanned cheeks, hair thick/long/blonde.,   Psychiatric: alert and oriented X3, mood and affect normal    Counseling     We reviewed the important post op bariatric recommendations:  -eating 3 meals  daily  -eating protein first, getting >60gm protein daily  -eating slowly, chewing food well  -avoiding/limiting calorie containing beverages  -drinking water 15-30 minutes before or after meals  -limiting restaurant or cafeteria eating to twice a week or less  Reviewed medication use/balance with nourishment needs.     Shon Chand MD    [facility].  2025  12:53 PM  [phone] (clinic phone)  [phone] (fax)    No images are attached to the encounter.  Medical Decision Makin minutes spent by me on the date of the encounter doing chart review, history and exam, documentation and further activities per the note

## 2025-05-29 NOTE — LETTER
5/29/2025      Israel Zabala  [id number] [place] [address]      Dear Colleague,    Thank you for referring your patient, Israel Zabala, to the [facility] [place]. Please see a copy of my visit note below.    Virtual Visit Details    Type of service:  Video Visit     Originating Location (pt. Location): Home    Distant Location (provider location):  On-site  Platform used for Video Visit: [facility]  Bariatric Follow Up Visit with a History of Previous Bariatric Surgery     Date of visit: 5/29/2025  Physician: Shon Chand MD, MD  Primary Care Provider:  Miriam Ghotra  Israel Zabala   44 year old  female    Date of Surgery: 2003 in [place] with Dr. Dey  Initial Weight: 284 lbs (max weight of 301 lbs)  Intake with our program in 2018 at 276 lbs  Borderline diabetes in the past at 6.6% A1c, started ozempic January of 2022 with improvements but with resistant Class III obesity, was transitioned to Mounjaro in August of 2023 after my appeal was granted by her insurance.  Today's Weight:   Wt Readings from Last 1 Encounters:   05/29/25 114.3 kg (252 lb)     Weight history:   Wt Readings from Last 4 Encounters:   05/29/25 114.3 kg (252 lb)   02/20/25 117 kg (258 lb)   01/10/25 117.5 kg (259 lb)   12/05/24 116.1 kg (256 lb)      Body mass index is 46.09 kg/m .      Assessment and Plan     Assessment: Israel is a 44 year old year old female who is about 22 years s/p  Dung en Y Gastric Bypass with Dr. Dey at [facility].  In the interim Mounjaro increased to 12.5mg/week in recent weeks and she finds it to be well tolerated but the 15mg/week dose didn't go through yet due to some NPI taxonomy issues that should now be sorted out .  She's had very resistant Class III obesity despite surgery/medication support but weight has been trending down in recent weeks. .    Bariatric labs 1/10/25 showed good support; mild excess vitamin D.  Her weight is down 49  lbs from max weight,  a 16.3% total body weight reduction. Less than average but enough to have important health benefits despite some resistant Class III, morbid obesity.    A1c much improved from 2022 when peaked at 6.6%, now down to 5.6% in good control.     Israel Zabala feels as if she has not fully achieved the goals she hoped to accomplish through bariatric surgery and weight loss.    Encounter Diagnoses   Name Primary?     Class 3 severe obesity due to excess calories with serious comorbidity and body mass index (BMI) of 45.0 to 49.9 in adult (H) Yes     Borderline diabetes      Hx of gastric bypass      Postoperative malabsorption      Weight gain following gastric bypass surgery          Current Outpatient Medications:      tirzepatide (MOUNJARO) 15 MG/0.5ML SOAJ auto-injector pen, Inject 0.5 mLs (15 mg) subcutaneously every 7 days., Disp: 6 mL, Rfl: 2     atorvastatin (LIPITOR) 10 MG tablet, Take 1 tablet (10 mg) by mouth daily., Disp: 90 tablet, Rfl: 3     Calcium Citrate-Vitamin D (CALCIUM CITRATE +D PO), Take 500 mg by mouth daily, Disp: , Rfl:      Cholecalciferol (VITAMIN D) 2000 UNITS tablet, Take 1 tablet by mouth daily, Disp: , Rfl:      ferrous sulfate (IRON) 325 (65 FE) MG tablet, Take 1 tablet (325 mg) by mouth 2 times daily, Disp: 60 tablet, Rfl: 2     Multiple Vitamins-Minerals (MULTIVITAMIN PO), , Disp: , Rfl:      mupirocin (BACTROBAN) 2 % external ointment, Apply topically 3 times daily (Patient not taking: Reported on 2/20/2025), Disp: 30 g, Rfl: 0     VITAMIN B-12 PO, 1 TABLET ORALLY DAILY(HOME MED), Disp: , Rfl:     Plan:  If my NPI taxonomy issues aren't sorted out yet (the system may not have updated yet), you can get Mounjaro 15mg/week refilled through your PCP. I'm not sure how much lag time is built into the system for approval.     2. Continue mindful bariatric methods: within 2 hours of waking, have your first meal. Meals should start with protein first, about 20 grams/meal and  "supplement as necessary with shakes/yogurt or other protein sources to hit your 70-90 grams/day protein goal. Hydrate well between meals and dose a meal every 4-5 hours to stay on track with keeping appetite controlled/body well nourished after gastric bypass.    3. Diabetes much improved, continue Mounjaro with increase to 15mg/week if tolerated.If too strong, you can maintain at the 12.5mg/week dose.     4. Try to avoid distracted eating at desk/car/in front of screens.     5. Aim for 3-5 sessions per week of some fitness or active hobby that gets your heart rate over 100 beats/minute for at least 12 minutes.       No follow-ups on file.    Bariatric Surgery Review     Interim History/LifeChanges: needs Mounjaro refill 15mg/week. Still losing weight slowly now, uptick from 12.5mg/week   12 and 15 yo daughters. Competitive dancers, frequently traveling to competitions.   Patient Concerns: slow weight loss  Appetite (1-10): improving  GERD: no.    Reviewed whether any need/indication for screening EGD today and we will deferred.  Typically, a screening EGD is recommend post op year 2-3 if no symptoms to assess health of esophagus/bariatric surgery and sooner if difficult to control GERD or persistent pain/dysphagia sx despite behavior modification.    Medication changes:     Vitamin Intake:   Continues regular vitamin use             LABS: \"Reviewed will recheck A1c/BMP at next visit. Vitamin labs in January '26.      [employer]   [id number] [employer] Version 1.1    Question 5/26/2025  1:13 PM CDT - Filed by Patient   In general, would you say your health is: Very good   In general, would you say your quality of life is: Very good   In general, how would you rate your physical health? Very good   In general, how would you rate your mental health, including your mood and your ability to think? Very good   In general, how would you rate your satisfaction with your social " "activities and relationships? Very good   In general, please rate how well you carry out your usual social activities and roles. (This includes activities at home, at work and in your community, and responsibilities as a parent, child, spouse, employee, friend, etc.) Very good   To what extent are you able to carry out your everyday physical activities such as walking, climbing stairs, carrying groceries, or moving a chair? Completely   In the past 7 days    How often have you been bothered by emotional problems such as feeling anxious, depressed or irritable? Never   How would you rate your fatigue on average? Mild   How would you rate your pain on average?   0 = No Pain  to  10 = Worst Imaginable Pain 2     [facility] Return Questionnaire    Question 5/26/2025  1:16 PM CDT - Filed by Patient   I have made the following changes to my diet since my last visit: Prioritizing protein.   With regards to my diet, I am still struggling with:    I have made the following changes to my activity/exercise since my last visit: Increasing walking outsude.   With regards to my activity/exercise, I am still struggling with:    Which weight loss medications are you currently taking on a regular basis?    If you are not taking a weight loss medication that was prescribed to you, please indicate why:    Are you having any side effects from the weight loss medication that we have prescribed you? No         Most recent labs:  Lab Results   Component Value Date    WBC 7.6 01/10/2025    HGB 14.9 01/10/2025    HCT 45.4 01/10/2025    MCV 99 01/10/2025     01/10/2025     Lab Results   Component Value Date    CHOL 167 01/10/2025     Lab Results   Component Value Date    HDL 68 01/10/2025     No components found for: \"LDLCALC\"  Lab Results   Component Value Date    TRIG 75 01/10/2025     No results found for: \"CHOLHDL\"  Lab Results   Component Value Date    ALT 34 01/10/2025    AST 30 01/10/2025    ALKPHOS 107 01/10/2025     No " "results found for: \"HGBA1C\"  Lab Results   Component Value Date    B12 1,109 01/10/2025     No components found for: \"VITDT1\"  Lab Results   Component Value Date    KETAN 108 01/10/2025     Lab Results   Component Value Date    PTHI 45 01/10/2025     Lab Results   Component Value Date    ZN 99.1 01/10/2025     Lab Results   Component Value Date    VIB1WB 141 2024     Lab Results   Component Value Date    TSH 1.98 2024     No results found for: \"TEST\"    Habits:  Walking some, 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     Social History     Social History     Socioeconomic History     Marital status:      Spouse name: Not on file     Number of children: 1     Years of education: Not on file     Highest education level: Not on file   Occupational History     Occupation:    Tobacco Use     Smoking status: Former     Current packs/day: 0.00     Average packs/day: 0.1 packs/day for 8.0 years (0.8 ttl pk-yrs)     Types: Cigarettes     Start date: 2000     Quit date: 2008     Years since quittin.6     Smokeless tobacco: Never   Vaping Use     Vaping status: Never Used   Substance and Sexual Activity     Alcohol use: Yes     Comment: occasional     Drug use: No     Sexual activity: Yes     Partners: Male     Birth control/protection: None     Comment: NFP   Other Topics Concern     Parent/sibling w/ CABG, MI or angioplasty before 65F 55M? No   Social History Narrative     Not on file     Social Drivers of Health     Financial Resource Strain: Low Risk  (2025)    Financial Resource Strain      Within the past 12 months, have you or your family members you live with been unable to get utilities (heat, electricity) when it was really needed?: No   Food Insecurity: Low Risk  (2025)    Food Insecurity      Within the past 12 months, did you worry that your food would run out before you got money to buy more?: No      Within the past 12 months, did the food you bought just not last and you didn t " have money to get more?: No   Transportation Needs: Low Risk  (1/6/2025)    Transportation Needs      Within the past 12 months, has lack of transportation kept you from medical appointments, getting your medicines, non-medical meetings or appointments, work, or from getting things that you need?: No   Physical Activity: Insufficiently Active (1/6/2025)    Exercise Vital Sign      Days of Exercise per Week: 2 days      Minutes of Exercise per Session: 20 min   Stress: No Stress Concern Present (1/6/2025)    [employer] [place] [employer] - Occupational Stress Questionnaire      Feeling of Stress : Only a little   Social Connections: Unknown (1/6/2025)    Social Connection and Isolation Panel [NHANES]      Frequency of Communication with Friends and Family: Not on file      Frequency of Social Gatherings with Friends and Family: Once a week      Attends Denominational Services: Not on file      Active Member of Clubs or Organizations: Not on file      Attends Club or Organization Meetings: Not on file      Marital Status: Not on file   Interpersonal Safety: Low Risk  (1/10/2025)    Interpersonal Safety      Do you feel physically and emotionally safe where you currently live?: Yes      Within the past 12 months, have you been hit, slapped, kicked or otherwise physically hurt by someone?: No      Within the past 12 months, have you been humiliated or emotionally abused in other ways by your partner or ex-partner?: No   Housing Stability: Low Risk  (1/6/2025)    Housing Stability      Do you have housing? : Yes      Are you worried about losing your housing?: No       Past Medical History     Past Medical History:   Diagnosis Date     Abnormal glandular Papanicolaou smear of cervix 2002    Negative colpo per patient     Cancer (H) 11/7/2013    focally positive for metastatic carcinoid tumor- [facility]     Chickenpox      Chronic maxillary sinusitis 03/31/2006     CTS (carpal tunnel syndrome) 03/06/2014      Situational anxiety 12/13/2012    Related to her cancer diagnosis     Past Surgical History:   Procedure Laterality Date     BRONCHOSCOPY FLEXIBLE  11/01/2013    related to carcinoid;  Flexible Bronchoscopy, Endobronchial Flexible Ultrasound;  Surgeon: Chris Sal MD;  Location: [facility] OR     CHOLECYSTECTOMY       ENDOBRONCHIAL ULTRASOUND FLEXIBLE  11/01/2013    Procedure: ENDOBRONCHIAL ULTRASOUND FLEXIBLE;;  Surgeon: Chris Sal MD;  Location: [facility] OR     GASTRIC BYPASS  2003     HERNIA REPAIR      2011, supraumbilical     HERNIORRHAPHY INCISIONAL (LOCATION)  08/19/2011    Procedure:HERNIORRHAPHY INCISIONAL (LOCATION); Incisional Hernia Repair upper abdomen midline       LUNG LOBECTOMY      for carcinoid, done at [place]     REVISION DUNG-EN-Y       TONSILLECTOMY  age 7     ZZC REMOVAL OF LUNG,BILOBECTOMY Right 11/22/2013    Carcinoid: Right Middle and Lower Lobe       Problem List     Patient Active Problem List   Diagnosis     CARDIOVASCULAR SCREENING; LDL GOAL LESS THAN 160     Hx of gastric bypass     Excessive bleeding in premenopausal period     White coat syndrome without diagnosis of hypertension     Nipple discharge in female     Postoperative malabsorption     Secondary hyperparathyroidism, non-renal     Hyperlipidemia LDL goal <70     Prediabetes     Medications     [unfilled]  Surgical History     Past Surgical History  She has a past surgical history that includes gastric bypass (2003); tonsillectomy (age 7); Herniorrhaphy incisional (location) (08/19/2011); REMOVAL OF LUNG,BILOBECTOMY (Right, 11/22/2013); Bronchoscopy flexible (11/01/2013); Endobronchial ultrasound flexible (11/01/2013); Revision Dung-En-Y; hernia repair; Cholecystectomy; and Lung Lobectomy.    Objective-Exam     Constitutional:  Wt 114.3 kg (252 lb)   BMI 46.09 kg/m    [unfilled]   General:  Pleasant and in no acute distress   Eyes:  EOMI  ENT:  Airway normal facies.    Neck:  Respiratory: Normal  respiratory effort, no cough, .  CV:    Gastrointestinal:   Musculoskeletal: muscle mass WNL  Skin: color fair, slightly tanned cheeks, hair thick/long/blonde.,   Psychiatric: alert and oriented X3, mood and affect normal    Counseling     We reviewed the important post op bariatric recommendations:  -eating 3 meals daily  -eating protein first, getting >60gm protein daily  -eating slowly, chewing food well  -avoiding/limiting calorie containing beverages  -drinking water 15-30 minutes before or after meals  -limiting restaurant or cafeteria eating to twice a week or less  Reviewed medication use/balance with nourishment needs.     Shon Chand MD    [facility].  2025  12:53 PM  [phone] (clinic phone)  [phone] (fax)    No images are attached to the encounter.  Medical Decision Makin minutes spent by me on the date of the encounter doing chart review, history and exam, documentation and further activities per the note    Again, thank you for allowing me to participate in the care of your patient.        Sincerely,        Shon Chand MD    Electronically signed

## 2025-06-12 DIAGNOSIS — R73.03 BORDERLINE DIABETES: ICD-10-CM

## 2025-06-17 ENCOUNTER — E-VISIT (OUTPATIENT)
Dept: URGENT CARE | Facility: CLINIC | Age: 45
End: 2025-06-17
Payer: COMMERCIAL

## 2025-06-17 DIAGNOSIS — N39.0 ACUTE UTI (URINARY TRACT INFECTION): Primary | ICD-10-CM

## 2025-06-17 PROCEDURE — 99207 PR NON-BILLABLE SERV PER CHARTING: CPT | Performed by: EMERGENCY MEDICINE

## 2025-06-17 RX ORDER — SULFAMETHOXAZOLE AND TRIMETHOPRIM 800; 160 MG/1; MG/1
1 TABLET ORAL 2 TIMES DAILY
Qty: 6 TABLET | Refills: 0 | Status: SHIPPED | OUTPATIENT
Start: 2025-06-17 | End: 2025-06-20

## 2025-06-17 NOTE — PATIENT INSTRUCTIONS
Dear Israel Zabala    After reviewing your responses, I've been able to diagnose you with a urinary tract infection, which is a common infection of the bladder with bacteria.  This is not a sexually transmitted infection, though urinating immediately after intercourse can help prevent infections.  Drinking lots of fluids is also helpful to clear your current infection and prevent the next one.      I have sent a prescription for antibiotics to your pharmacy to treat this infection.    It is important that you take all of your prescribed medication even if your symptoms are improving after a few doses.  Taking all of your medicine helps prevent the symptoms from returning.     If your symptoms worsen, you develop pain in your back or stomach, develop fevers, or are not improving in 5 days, please contact your primary care provider for an appointment or visit any of our convenient Walk-in or Urgent Care Centers to be seen, which can be found on our website here.    Thanks again for choosing us as your health care partner,    Otoniel Duffy MD  Female Urinary Tract Infection (UTI): Care Instructions  Overview    A urinary tract infection (UTI) is an infection caused by bacteria. It can happen anywhere in the urinary tract. A UTI can happen in the:  Kidneys.  Ureters, the tubes that connect the kidneys to the bladder.  Bladder.  Urethra, where the urine comes out.  Most UTIs are bladder infections. They often cause pain or burning when you urinate.  Most UTIs can be cured with antibiotics. If you are prescribed antibiotics, be sure to complete your treatment so that the infection does not get worse.  Follow-up care is a key part of your treatment and safety. Be sure to make and go to all appointments, and call your doctor if you are having problems. It's also a good idea to know your test results and keep a list of the medicines you take.  How can you care for yourself at home?  Take your antibiotics as directed.  Do not stop taking them just because you feel better. You need to take the full course of antibiotics.  Drink extra water and other fluids for the next day or two. This will help make the urine less concentrated and help wash out the bacteria that are causing the infection. (If you have kidney, heart, or liver disease and have to limit fluids, talk with your doctor before you increase the amount of fluids you drink.)  Avoid drinks that are carbonated or have caffeine. They can irritate the bladder.  Urinate often. Try to empty your bladder each time.  To relieve pain, try taking a warm bath in plain water or laying a heating pad set on low over your lower belly or genital area. Never go to sleep with a heating pad in place.  Avoid bubble baths and hygiene sprays, powders, or perfumes in the vagina or on the vulva. Do not douche. These things can irritate the urethra and may make symptoms worse.  To help prevent UTIs  Drink plenty of water each day. This helps you urinate often, which clears bacteria from your system. (If you have kidney, heart, or liver disease and have to limit fluids, talk with your doctor before you increase the amount of fluids you drink.)  Urinate when you need to.  If you are sexually active, urinate right after you have sex.  Change sanitary pads often.  After going to the bathroom, wipe from front to back.  When should you call for help?  Contact your doctor now or seek immediate medical care if:  Symptoms such as a fever, chills, nausea, or vomiting get worse or happen for the first time.  You have new pain in your back just below your rib cage. This is called flank pain.  There is new blood or pus in your urine.  You are not able to take or keep down your antibiotics.  Watch closely for changes in your health, and be sure to contact your doctor if:  You are not getting better after taking an antibiotic for 2 days.  Your symptoms go away but then come back.  Where can you learn more?  Go to  "https://www.[employer].net/patiented  Enter K848 in the search box to learn more about \"Female Urinary Tract Infection (UTI): Care Instructions.\"  Current as of: April 30, 2024  Content Version: 14.5 2024-2025 [employer].   Care instructions adapted under license by your healthcare professional. If you have questions about a medical condition or this instruction, always ask your healthcare professional. [employer] disclaims any warranty or liability for your use of this information.    "

## 2025-08-04 ENCOUNTER — HOSPITAL ENCOUNTER (EMERGENCY)
Facility: CLINIC | Age: 45
Discharge: HOME | End: 2025-08-04
Attending: FAMILY MEDICINE | Admitting: FAMILY MEDICINE
Payer: COMMERCIAL

## 2025-08-04 VITALS
RESPIRATION RATE: 13 BRPM | WEIGHT: 255 LBS | HEIGHT: 63 IN | DIASTOLIC BLOOD PRESSURE: 80 MMHG | BODY MASS INDEX: 45.18 KG/M2 | TEMPERATURE: 98.3 F | SYSTOLIC BLOOD PRESSURE: 140 MMHG | OXYGEN SATURATION: 100 % | HEART RATE: 85 BPM

## 2025-08-04 DIAGNOSIS — R00.2 PALPITATIONS: Primary | ICD-10-CM

## 2025-08-04 LAB
ANION GAP SERPL CALCULATED.3IONS-SCNC: 13 MMOL/L (ref 7–15)
BASOPHILS # BLD AUTO: 0.1 10E3/UL (ref 0–0.2)
BASOPHILS NFR BLD AUTO: 1 %
BUN SERPL-MCNC: 22 MG/DL (ref 6–20)
CALCIUM SERPL-MCNC: 10.2 MG/DL (ref 8.8–10.4)
CHLORIDE SERPL-SCNC: 103 MMOL/L (ref 98–107)
CREAT SERPL-MCNC: 0.7 MG/DL (ref 0.51–0.95)
EGFRCR SERPLBLD CKD-EPI 2021: >90 ML/MIN/1.73M2
EOSINOPHIL # BLD AUTO: 0.2 10E3/UL (ref 0–0.7)
EOSINOPHIL NFR BLD AUTO: 2 %
ERYTHROCYTE [DISTWIDTH] IN BLOOD BY AUTOMATED COUNT: 13.2 % (ref 10–15)
GLUCOSE SERPL-MCNC: 100 MG/DL (ref 70–99)
HCO3 SERPL-SCNC: 24 MMOL/L (ref 22–29)
HCT VFR BLD AUTO: 45.8 % (ref 35–47)
HGB BLD-MCNC: 15.3 G/DL (ref 11.7–15.7)
HOLD SPECIMEN: NORMAL
IMM GRANULOCYTES # BLD: 0 10E3/UL
IMM GRANULOCYTES NFR BLD: 0 %
LYMPHOCYTES # BLD AUTO: 5 10E3/UL (ref 0.8–5.3)
LYMPHOCYTES NFR BLD AUTO: 49 %
MCH RBC QN AUTO: 32.7 PG (ref 26.5–33)
MCHC RBC AUTO-ENTMCNC: 33.4 G/DL (ref 31.5–36.5)
MCV RBC AUTO: 98 FL (ref 78–100)
MONOCYTES # BLD AUTO: 0.7 10E3/UL (ref 0–1.3)
MONOCYTES NFR BLD AUTO: 7 %
NEUTROPHILS # BLD AUTO: 4.1 10E3/UL (ref 1.6–8.3)
NEUTROPHILS NFR BLD AUTO: 41 %
NRBC # BLD AUTO: 0 10E3/UL
NRBC BLD AUTO-RTO: 0 /100
PLATELET # BLD AUTO: 253 10E3/UL (ref 150–450)
POTASSIUM SERPL-SCNC: 4.1 MMOL/L (ref 3.4–5.3)
RBC # BLD AUTO: 4.68 10E6/UL (ref 3.8–5.2)
SODIUM SERPL-SCNC: 140 MMOL/L (ref 135–145)
TROPONIN T SERPL HS-MCNC: 7 NG/L
TROPONIN T SERPL HS-MCNC: <6 NG/L
TSH SERPL DL<=0.005 MIU/L-ACNC: 3.09 UIU/ML (ref 0.3–4.2)
WBC # BLD AUTO: 10.1 10E3/UL (ref 4–11)

## 2025-08-04 PROCEDURE — 99284 EMERGENCY DEPT VISIT MOD MDM: CPT | Performed by: FAMILY MEDICINE

## 2025-08-04 PROCEDURE — 84443 ASSAY THYROID STIM HORMONE: CPT | Performed by: FAMILY MEDICINE

## 2025-08-04 PROCEDURE — 85025 COMPLETE CBC W/AUTO DIFF WBC: CPT | Performed by: FAMILY MEDICINE

## 2025-08-04 PROCEDURE — 93005 ELECTROCARDIOGRAM TRACING: CPT | Performed by: FAMILY MEDICINE

## 2025-08-04 PROCEDURE — 93010 ELECTROCARDIOGRAM REPORT: CPT | Performed by: FAMILY MEDICINE

## 2025-08-04 PROCEDURE — 84484 ASSAY OF TROPONIN QUANT: CPT | Performed by: FAMILY MEDICINE

## 2025-08-04 PROCEDURE — 36415 COLL VENOUS BLD VENIPUNCTURE: CPT | Performed by: FAMILY MEDICINE

## 2025-08-04 PROCEDURE — 82310 ASSAY OF CALCIUM: CPT | Performed by: FAMILY MEDICINE

## 2025-08-04 ASSESSMENT — ACTIVITIES OF DAILY LIVING (ADL)
ADLS_ACUITY_SCORE: 41

## 2025-08-06 LAB
ATRIAL RATE - MUSE: 98 BPM
DIASTOLIC BLOOD PRESSURE - MUSE: NORMAL MMHG
INTERPRETATION ECG - MUSE: NORMAL
P AXIS - MUSE: 37 DEGREES
PR INTERVAL - MUSE: 140 MS
QRS DURATION - MUSE: 92 MS
QT - MUSE: 342 MS
QTC - MUSE: 436 MS
R AXIS - MUSE: 17 DEGREES
SYSTOLIC BLOOD PRESSURE - MUSE: NORMAL MMHG
T AXIS - MUSE: -33 DEGREES
VENTRICULAR RATE- MUSE: 98 BPM